# Patient Record
Sex: FEMALE | Race: WHITE | NOT HISPANIC OR LATINO | Employment: FULL TIME | URBAN - METROPOLITAN AREA
[De-identification: names, ages, dates, MRNs, and addresses within clinical notes are randomized per-mention and may not be internally consistent; named-entity substitution may affect disease eponyms.]

---

## 2019-11-19 ENCOUNTER — OFFICE VISIT (OUTPATIENT)
Dept: PODIATRY | Facility: CLINIC | Age: 24
End: 2019-11-19

## 2019-11-19 VITALS
HEIGHT: 66 IN | WEIGHT: 196 LBS | HEART RATE: 110 BPM | DIASTOLIC BLOOD PRESSURE: 86 MMHG | SYSTOLIC BLOOD PRESSURE: 130 MMHG | BODY MASS INDEX: 31.5 KG/M2

## 2019-11-19 DIAGNOSIS — L60.0 INGROWING NAIL: Primary | ICD-10-CM

## 2019-11-19 PROBLEM — R00.2 PALPITATIONS: Status: ACTIVE | Noted: 2019-05-06

## 2019-11-19 PROBLEM — F41.9 ANXIETY: Status: ACTIVE | Noted: 2018-06-25

## 2019-11-19 PROBLEM — Z30.8 ENCOUNTER FOR OTHER CONTRACEPTIVE MANAGEMENT: Status: ACTIVE | Noted: 2018-06-25

## 2019-11-19 PROCEDURE — 99203 OFFICE O/P NEW LOW 30 MIN: CPT | Mod: 25 | Performed by: PODIATRIST

## 2019-11-19 PROCEDURE — 11750 EXCISION NAIL&NAIL MATRIX: CPT | Mod: T5 | Performed by: PODIATRIST

## 2019-11-19 RX ORDER — FLUOXETINE 10 MG/1
10 CAPSULE ORAL
Refills: 0 | COMMUNITY
Start: 2019-10-15 | End: 2021-04-29

## 2019-11-19 RX ORDER — HYDROXYZINE HYDROCHLORIDE 10 MG/1
TABLET, FILM COATED ORAL
Refills: 3 | COMMUNITY
Start: 2019-10-15 | End: 2021-04-29

## 2019-11-19 RX ORDER — NORGESTREL AND ETHINYL ESTRADIOL 0.3-0.03MG
KIT ORAL
Refills: 2 | COMMUNITY
Start: 2019-08-17 | End: 2021-04-29

## 2019-11-19 RX ORDER — CROMOLYN SODIUM 40 MG/ML
SOLUTION/ DROPS OPHTHALMIC
Refills: 10 | COMMUNITY
Start: 2019-08-17 | End: 2021-04-29

## 2019-11-19 RX ORDER — FLUTICASONE PROPIONATE 50 MCG
SPRAY, SUSPENSION (ML) NASAL
Refills: 0 | COMMUNITY
Start: 2019-08-17 | End: 2021-04-29

## 2019-11-19 ASSESSMENT — MIFFLIN-ST. JEOR: SCORE: 1637.86

## 2019-11-19 NOTE — PROGRESS NOTES
"Subjective:    Pt is seen today as a new pt referral w/ the c/c of a painful ingrown right great nail both border.  This has been problematic for 2 month(s). negativehistory of drainage from the site. This is slowly getting worse.  Aggravated by activity and relieved by rest.  Has tried soaking which has not helped.   denies history of trauma to the area.  Works at sit down job.   Has had numerous temporary removals in thye past and would like permanent.      ROS:  A 10-point review of systems was performed and is positive for that noted in the HPI and as seen above.  All other areas are negative.          Allergies   Allergen Reactions     Penicillins Hives     Azithromycin Rash       Current Outpatient Medications   Medication Sig Dispense Refill     FLUoxetine (PROZAC) 10 MG capsule 10 mg  0     cromolyn (OPTICROM) 4 % ophthalmic solution INT 2 GTS IN OU QID  10     fluticasone (FLONASE) 50 MCG/ACT nasal spray SHAKE LQ AND U 2 SPRAYS IEN QD  0     hydrOXYzine (ATARAX) 10 MG tablet TK 1 TO 3 TS PO QHS  3     LOW-OGESTREL 0.3-30 MG-MCG tablet TK 1 T PO QD  2       Patient Active Problem List   Diagnosis     Anxiety     Encounter for other contraceptive management     Palpitations       No past medical history on file.    No past surgical history on file.    No family history on file.    Social History     Tobacco Use     Smoking status: Never Smoker     Smokeless tobacco: Never Used   Substance Use Topics     Alcohol use: Not on file         Exam:    Vitals: /86   Pulse 110   Ht 1.664 m (5' 5.5\")   Wt 88.9 kg (196 lb)   BMI 32.12 kg/m    BMI: Body mass index is 32.12 kg/m .  Height: 5' 5.5\"    Constitutional/ general:  Pt is in no apparent distress, appears well-nourished.  Cooperative with history and physical exam.     Psych:  The patient answered questions appropriately.  Normal affect.  Seems to have reasonable expectations, in terms of treatment.     Eyes:  Visual scanning/ tracking without deficit. "     Ears:  Response to auditory stimuli is normal.  negative hearing aid devices.  Auricles in proper alignment.     Lymphatic:  Popliteal lymph nodes not enlarged.     Lungs:  Non labored breathing, non labored speech. No cough.  No audible wheezing. Even, quiet breathing.       Vascular:  positive pedal pulses bilaterally for both the DP and PT arteries.  CFT < 3 sec.  negative ankle edema.  positive pedal hair growth.    Neuro:  Alert and oriented x 3. Coordinated gait.  Light touch sensation is intact to the L4, L5, S1 distributions. No obvious deficits.  No evidence of neurological-based weakness, spasticity, or contracture in the lower extremities.     Derm: Normal texture and turgor.  No erythema, ecchymosis, or cyanosis.      Musculoskeletal:    Lower extremity muscle strength is normal.  Patient is ambulatory without an assistive device or brace.  Normal arch with weightbearing.  No forefoot or rear foot deformities noted.   Normal ROM all fore foot and rearfoot joints.  No equinus.  right great toe nail both border shows soft tissue impingement with localized erythema.   negative active drainage/purulence at this time.  No sinus tracts.  No nailbed masses or exostosis.  No pain with range of motion of IPJ or MTPJ.  No ascending cellulitis.    ASSESSMENT:    Onychocryptosis with paronychia right great both border.    Discussed etiology and treatment options in detail w/ the pt.  The potential causes and nature of an ingrown toenail were discussed with the patient.  We reviewed the natural history/prognosis of the condition and potential risks if no treatment is provided.      Treatment options discussed included conservative management (oral antibiotics, soaking of foot, adequate width shoes)  as well as surgical management (partial or total nail removal).  The pros and cons of both forms of treatment were reviewed.  Handout given to patient.      After thorough discussion and answering all questions, the  patient elected to have permanent removal of affected nail border.  Risk complications and efficacy discussed with patient.  Obtained consent, used 3cc of 1% lidocaine plain to block right great toe.  Sterile prep, then avulsed the affected border.  No evidence of deep abscess noted.  Phenol was applied times 3 at 3o second intervals with curettage in between and then alcohol rinse.  Pt tolerated procedure well.  Sterile bandage placed, gave wound care instruction.  Return to clinic prn    Dave Hodges DPM DPM, FACFAS

## 2019-11-19 NOTE — LETTER
"    11/19/2019         RE: Deysi Stoner  [address]  [place] [address]        Dear Colleague,    Thank you for referring your patient, Deysi Stoner, to the [facility] [place]. Please see a copy of my visit note below.    Subjective:    Pt is seen today as a new pt referral w/ the c/c of a painful ingrown right great nail both border.  This has been problematic for 2 month(s). negativehistory of drainage from the site. This is slowly getting worse.  Aggravated by activity and relieved by rest.  Has tried soaking which has not helped.   denies history of trauma to the area.  Works at sit down job.   Has had numerous temporary removals in thye past and would like permanent.      ROS:  A 10-point review of systems was performed and is positive for that noted in the HPI and as seen above.  All other areas are negative.          Allergies   Allergen Reactions     Penicillins Hives     Azithromycin Rash       Current Outpatient Medications   Medication Sig Dispense Refill     FLUoxetine (PROZAC) 10 MG capsule 10 mg  0     cromolyn (OPTICROM) 4 % ophthalmic solution INT 2 GTS IN OU QID  10     fluticasone (FLONASE) 50 MCG/ACT nasal spray SHAKE LQ AND U 2 SPRAYS IEN QD  0     hydrOXYzine (ATARAX) 10 MG tablet TK 1 TO 3 TS PO QHS  3     LOW-OGESTREL 0.3-30 MG-MCG tablet TK 1 T PO QD  2       Patient Active Problem List   Diagnosis     Anxiety     Encounter for other contraceptive management     Palpitations       No past medical history on file.    No past surgical history on file.    No family history on file.    Social History     Tobacco Use     Smoking status: Never Smoker     Smokeless tobacco: Never Used   Substance Use Topics     Alcohol use: Not on file         Exam:    Vitals: /86   Pulse 110   Ht 1.664 m (5' 5.5\")   Wt 88.9 kg (196 lb)   BMI 32.12 kg/m     BMI: Body mass index is 32.12 kg/m .  Height: 5' 5.5\"    Constitutional/ general:  Pt is in no apparent distress, " appears well-nourished.  Cooperative with history and physical exam.     Psych:  The patient answered questions appropriately.  Normal affect.  Seems to have reasonable expectations, in terms of treatment.     Eyes:  Visual scanning/ tracking without deficit.     Ears:  Response to auditory stimuli is normal.  negative hearing aid devices.  Auricles in proper alignment.     Lymphatic:  Popliteal lymph nodes not enlarged.     Lungs:  Non labored breathing, non labored speech. No cough.  No audible wheezing. Even, quiet breathing.       Vascular:  positive pedal pulses bilaterally for both the DP and PT arteries.  CFT < 3 sec.  negative ankle edema.  positive pedal hair growth.    Neuro:  Alert and oriented x 3. Coordinated gait.  Light touch sensation is intact to the L4, L5, S1 distributions. No obvious deficits.  No evidence of neurological-based weakness, spasticity, or contracture in the lower extremities.     Derm: Normal texture and turgor.  No erythema, ecchymosis, or cyanosis.      Musculoskeletal:    Lower extremity muscle strength is normal.  Patient is ambulatory without an assistive device or brace.  Normal arch with weightbearing.  No forefoot or rear foot deformities noted.   Normal ROM all fore foot and rearfoot joints.  No equinus.  right great toe nail both border shows soft tissue impingement with localized erythema.   negative active drainage/purulence at this time.  No sinus tracts.  No nailbed masses or exostosis.  No pain with range of motion of IPJ or MTPJ.  No ascending cellulitis.    ASSESSMENT:    Onychocryptosis with paronychia right great both border.    Discussed etiology and treatment options in detail w/ the pt.  The potential causes and nature of an ingrown toenail were discussed with the patient.  We reviewed the natural history/prognosis of the condition and potential risks if no treatment is provided.      Treatment options discussed included conservative management (oral  antibiotics, soaking of foot, adequate width shoes)  as well as surgical management (partial or total nail removal).  The pros and cons of both forms of treatment were reviewed.  Handout given to patient.      After thorough discussion and answering all questions, the patient elected to have permanent removal of affected nail border.  Risk complications and efficacy discussed with patient.  Obtained consent, used 3cc of 1% lidocaine plain to block right great toe.  Sterile prep, then avulsed the affected border.  No evidence of deep abscess noted.  Phenol was applied times 3 at 3o second intervals with curettage in between and then alcohol rinse.  Pt tolerated procedure well.  Sterile bandage placed, gave wound care instruction.  Return to clinic prn    Dave Hodges DPM DPM, FACFAS      Again, thank you for allowing me to participate in the care of your patient.        Sincerely,        Dave Hodges DPM

## 2019-11-19 NOTE — PATIENT INSTRUCTIONS
Weight management plan: Patient was referred to their PCP to discuss a diet and exercise plan.  We wish you continued good healing. If you have any questions or concerns, please do not hesitate to contact us at [phone]    Please remember to call and schedule a follow up appointment if one was recommended at your earliest convenience.   PODIATRY CLINIC HOURS  TELEPHONE NUMBER    Dr. Dave Hodges D.P.M [facility]    Clinics:  [facility]    Jackie Mcconnell [facility]   Tuesday 1PM-6PM  [place]  Wednesday 7AM-2PM  [facility]  Thursday 10AM-6PM  [place]  Friday 7AM-3PM  [place]  Specialty schedulers:   [phone] to make an appointment with any Specialty Provider.        Urgent Care locations:    [facility] Monday-Friday 5 pm - 9 pm. Saturday-Sunday 9 am -5pm    Monday-Friday 11 am - 9 pm Saturday 9 am - 5 pm     Monday-Sunday 12 noon-8PM [phone]     If you need a medication refill, please contact us you may need lab work and/or a follow up visit prior to your refill (i.e. Antifungal medications).    [facility] (secure e-mail communication and access to your chart) to send a message or to make an appointment.    If MRI needed please call [place] at [phone]

## 2019-11-26 ENCOUNTER — OFFICE VISIT (OUTPATIENT)
Dept: PODIATRY | Facility: CLINIC | Age: 24
End: 2019-11-26

## 2019-11-26 VITALS
SYSTOLIC BLOOD PRESSURE: 140 MMHG | BODY MASS INDEX: 32.12 KG/M2 | DIASTOLIC BLOOD PRESSURE: 90 MMHG | HEART RATE: 92 BPM | WEIGHT: 196 LBS

## 2019-11-26 DIAGNOSIS — L60.0 INGROWING NAIL: Primary | ICD-10-CM

## 2019-11-26 PROCEDURE — 11750 EXCISION NAIL&NAIL MATRIX: CPT | Mod: 79 | Performed by: PODIATRIST

## 2019-11-26 NOTE — LETTER
11/26/2019         RE: Deysi Stoner  [address] [place] [address]        Dear Colleague,    Thank you for referring your patient, Deysi Stoner, to the [facility]. Please see a copy of my visit note below.    Subjective:    Pt is seen today w/ the c/c of a painful ingrown left great nail both border.  This has been problematic for years off and on.  Recently bothersome for a few months negativehistory of drainage from the site. This is slowly getting worse.  Aggravated by activity and relieved by rest.  Has tried soaking which has not helped.   denies history of trauma to the area.  Has had a temporary in the past.  Would like a permanent today.  She had a permanent removal done on both borders of her right hallux approximately 1 week ago.  She says just recently is now starting to feel better and drain less.     ROS:  A 10-point review of systems was performed and is positive for that noted in the HPI and as seen above.  All other areas are negative.          Allergies   Allergen Reactions     Penicillins Hives     Azithromycin Rash       Current Outpatient Medications   Medication Sig Dispense Refill     cromolyn (OPTICROM) 4 % ophthalmic solution INT 2 GTS IN OU QID  10     FLUoxetine (PROZAC) 10 MG capsule 10 mg  0     fluticasone (FLONASE) 50 MCG/ACT nasal spray SHAKE LQ AND U 2 SPRAYS IEN QD  0     hydrOXYzine (ATARAX) 10 MG tablet TK 1 TO 3 TS PO QHS  3     LOW-OGESTREL 0.3-30 MG-MCG tablet TK 1 T PO QD  2       Patient Active Problem List   Diagnosis     Anxiety     Encounter for other contraceptive management     Palpitations       No past medical history on file.    No past surgical history on file.    No family history on file.    Social History     Tobacco Use     Smoking status: Never Smoker     Smokeless tobacco: Never Used   Substance Use Topics     Alcohol use: Not on file         Exam:    Vitals: BP (!) 140/90   Pulse 92   Wt 88.9 kg (196 lb)   BMI 32.12 kg/m     BMI:  Body mass index is 32.12 kg/m .  Height: Data Unavailable    Constitutional/ general:  Pt is in no apparent distress, appears well-nourished.  Cooperative with history and physical exam.     Psych:  The patient answered questions appropriately.  Normal affect.  Seems to have reasonable expectations, in terms of treatment.     Eyes:  Visual scanning/ tracking without deficit.     Ears:  Response to auditory stimuli is normal.  negative hearing aid devices.  Auricles in proper alignment.     Lymphatic:  Popliteal lymph nodes not enlarged.     Lungs:  Non labored breathing, non labored speech. No cough.  No audible wheezing. Even, quiet breathing.       Vascular:  positive pedal pulses bilaterally for both the DP and PT arteries.  CFT < 3 sec.  negative ankle edema.  positive pedal hair growth.    Neuro:  Alert and oriented x 3. Coordinated gait.  Light touch sensation is intact to the L4, L5, S1 distributions. No obvious deficits.  No evidence of neurological-based weakness, spasticity, or contracture in the lower extremities.     Derm: Normal texture and turgor.  No erythema, ecchymosis, or cyanosis.      Musculoskeletal:    Lower extremity muscle strength is normal.  Patient is ambulatory without an assistive device or brace.  Normal arch with weightbearing.  No forefoot or rear foot deformities noted.   Normal ROM all fore foot and rearfoot joints.  No equinus.  left great toe nail both border shows soft tissue impingement with localized erythema.   negative active drainage/purulence at this time.  No sinus tracts.  No nailbed masses or exostosis.  No pain with range of motion of IPJ or MTPJ.  No ascending cellulitis.  Right hallux both borders has normal erythema and edema for 1 week postop phenol and alcohol.  There is no purulence or odor.    ASSESSMENT:    Onychocryptosis with paronychia left great both border.  Status post 1 week permanent removal of right hallux both borders    Discussed with patient that the  amount of erythema and edema she has on her right hallux is normal.  She will continue soaking this and give it time.  If for some reason this is getting worse she will call and we will start her on an antibiotic.    Discussed etiology and treatment options in detail w/ the pt.  The potential causes and nature of an ingrown toenail were discussed with the patient.  We reviewed the natural history/prognosis of the condition and potential risks if no treatment is provided.      Treatment options discussed included conservative management (oral antibiotics, soaking of foot, adequate width shoes)  as well as surgical management (partial or total nail removal).  The pros and cons of both forms of treatment were reviewed.  Handout given to patient.      After thorough discussion and answering all questions, the patient elected to have permanent removal of affected nail border.  Risk complications and efficacy discussed with patient.  Obtained consent, used 3cc of 1% lidocaine plain to block left great toe.  Sterile prep, then avulsed both borders.  No evidence of deep abscess noted.  Phenol was applied times 3 at 3o second intervals with curettage in between and then alcohol rinse.  Pt tolerated procedure well.  Sterile bandage placed, gave wound care instruction.  Return to clinic prn    Dave Hodges DPM DPM, FACFAS      Again, thank you for allowing me to participate in the care of your patient.        Sincerely,        Dave Hodges DPM

## 2019-11-26 NOTE — PATIENT INSTRUCTIONS
Weight management plan: Patient was referred to their PCP to discuss a diet and exercise plan.     Deysi to follow up with Primary Care provider regarding elevated blood pressure.

## 2019-11-26 NOTE — PROGRESS NOTES
Subjective:    Pt is seen today w/ the c/c of a painful ingrown left great nail both border.  This has been problematic for years off and on.  Recently bothersome for a few months negativehistory of drainage from the site. This is slowly getting worse.  Aggravated by activity and relieved by rest.  Has tried soaking which has not helped.   denies history of trauma to the area.  Has had a temporary in the past.  Would like a permanent today.  She had a permanent removal done on both borders of her right hallux approximately 1 week ago.  She says just recently is now starting to feel better and drain less.     ROS:  A 10-point review of systems was performed and is positive for that noted in the HPI and as seen above.  All other areas are negative.          Allergies   Allergen Reactions     Penicillins Hives     Azithromycin Rash       Current Outpatient Medications   Medication Sig Dispense Refill     cromolyn (OPTICROM) 4 % ophthalmic solution INT 2 GTS IN OU QID  10     FLUoxetine (PROZAC) 10 MG capsule 10 mg  0     fluticasone (FLONASE) 50 MCG/ACT nasal spray SHAKE LQ AND U 2 SPRAYS IEN QD  0     hydrOXYzine (ATARAX) 10 MG tablet TK 1 TO 3 TS PO QHS  3     LOW-OGESTREL 0.3-30 MG-MCG tablet TK 1 T PO QD  2       Patient Active Problem List   Diagnosis     Anxiety     Encounter for other contraceptive management     Palpitations       No past medical history on file.    No past surgical history on file.    No family history on file.    Social History     Tobacco Use     Smoking status: Never Smoker     Smokeless tobacco: Never Used   Substance Use Topics     Alcohol use: Not on file         Exam:    Vitals: BP (!) 140/90   Pulse 92   Wt 88.9 kg (196 lb)   BMI 32.12 kg/m    BMI: Body mass index is 32.12 kg/m .  Height: Data Unavailable    Constitutional/ general:  Pt is in no apparent distress, appears well-nourished.  Cooperative with history and physical exam.     Psych:  The patient answered questions  appropriately.  Normal affect.  Seems to have reasonable expectations, in terms of treatment.     Eyes:  Visual scanning/ tracking without deficit.     Ears:  Response to auditory stimuli is normal.  negative hearing aid devices.  Auricles in proper alignment.     Lymphatic:  Popliteal lymph nodes not enlarged.     Lungs:  Non labored breathing, non labored speech. No cough.  No audible wheezing. Even, quiet breathing.       Vascular:  positive pedal pulses bilaterally for both the DP and PT arteries.  CFT < 3 sec.  negative ankle edema.  positive pedal hair growth.    Neuro:  Alert and oriented x 3. Coordinated gait.  Light touch sensation is intact to the L4, L5, S1 distributions. No obvious deficits.  No evidence of neurological-based weakness, spasticity, or contracture in the lower extremities.     Derm: Normal texture and turgor.  No erythema, ecchymosis, or cyanosis.      Musculoskeletal:    Lower extremity muscle strength is normal.  Patient is ambulatory without an assistive device or brace.  Normal arch with weightbearing.  No forefoot or rear foot deformities noted.   Normal ROM all fore foot and rearfoot joints.  No equinus.  left great toe nail both border shows soft tissue impingement with localized erythema.   negative active drainage/purulence at this time.  No sinus tracts.  No nailbed masses or exostosis.  No pain with range of motion of IPJ or MTPJ.  No ascending cellulitis.  Right hallux both borders has normal erythema and edema for 1 week postop phenol and alcohol.  There is no purulence or odor.    ASSESSMENT:    Onychocryptosis with paronychia left great both border.  Status post 1 week permanent removal of right hallux both borders    Discussed with patient that the amount of erythema and edema she has on her right hallux is normal.  She will continue soaking this and give it time.  If for some reason this is getting worse she will call and we will start her on an antibiotic.    Discussed  etiology and treatment options in detail w/ the pt.  The potential causes and nature of an ingrown toenail were discussed with the patient.  We reviewed the natural history/prognosis of the condition and potential risks if no treatment is provided.      Treatment options discussed included conservative management (oral antibiotics, soaking of foot, adequate width shoes)  as well as surgical management (partial or total nail removal).  The pros and cons of both forms of treatment were reviewed.  Handout given to patient.      After thorough discussion and answering all questions, the patient elected to have permanent removal of affected nail border.  Risk complications and efficacy discussed with patient.  Obtained consent, used 3cc of 1% lidocaine plain to block left great toe.  Sterile prep, then avulsed both borders.  No evidence of deep abscess noted.  Phenol was applied times 3 at 3o second intervals with curettage in between and then alcohol rinse.  Pt tolerated procedure well.  Sterile bandage placed, gave wound care instruction.  Return to clinic prn    Dave Hodges DPM DPM, FACFAS

## 2020-01-07 ENCOUNTER — OFFICE VISIT (OUTPATIENT)
Dept: FAMILY MEDICINE | Facility: CLINIC | Age: 25
End: 2020-01-07
Payer: COMMERCIAL

## 2020-01-07 VITALS
HEART RATE: 120 BPM | HEIGHT: 65 IN | DIASTOLIC BLOOD PRESSURE: 96 MMHG | WEIGHT: 197 LBS | BODY MASS INDEX: 32.82 KG/M2 | SYSTOLIC BLOOD PRESSURE: 138 MMHG | OXYGEN SATURATION: 100 % | TEMPERATURE: 101 F

## 2020-01-07 DIAGNOSIS — J10.1 INFLUENZA A: Primary | ICD-10-CM

## 2020-01-07 LAB
FLUAV+FLUBV AG SPEC QL: NEGATIVE
FLUAV+FLUBV AG SPEC QL: POSITIVE
SPECIMEN SOURCE: ABNORMAL

## 2020-01-07 PROCEDURE — 87804 INFLUENZA ASSAY W/OPTIC: CPT | Mod: 59 | Performed by: FAMILY MEDICINE

## 2020-01-07 PROCEDURE — 99203 OFFICE O/P NEW LOW 30 MIN: CPT | Performed by: FAMILY MEDICINE

## 2020-01-07 RX ORDER — CODEINE PHOSPHATE AND GUAIFENESIN 10; 100 MG/5ML; MG/5ML
1-2 SOLUTION ORAL EVERY 4 HOURS PRN
Qty: 240 ML | Refills: 0 | Status: SHIPPED | OUTPATIENT
Start: 2020-01-07 | End: 2020-07-15

## 2020-01-07 ASSESSMENT — PAIN SCALES - GENERAL: PAINLEVEL: NO PAIN (0)

## 2020-01-07 ASSESSMENT — MIFFLIN-ST. JEOR: SCORE: 1634.47

## 2020-01-07 NOTE — PROGRESS NOTES
"SUBJECTIVE:   Deysi Stoner is a 24 year old female presenting with a chief complaint of a cough.  The patient first noted the onset of symptoms was 2 day(s) ago. Sunday in the am   The patient (or parent) reports that she first had symptoms of fever up to 102.7 and chills . After that she started having symptoms of a cough. After that she started having symptoms of soreness in the chest and abdominal and . Other symptoms that followed include a sore throat amd muscle acheds    She (or parent) denies: shortness of breath.      The patient (or parent) reports that she had tried Dayquil and Nyquill  and it has not been helpful.  The patient has the following predisposing factors for infection: chronic  allergies.    Patient Active Problem List   Diagnosis     Anxiety     Encounter for other contraceptive management     Palpitations     Current Outpatient Medications   Medication Sig Dispense Refill     cromolyn (OPTICROM) 4 % ophthalmic solution INT 2 GTS IN OU QID  10     FLUoxetine (PROZAC) 10 MG capsule 10 mg  0     fluticasone (FLONASE) 50 MCG/ACT nasal spray SHAKE LQ AND U 2 SPRAYS IEN QD  0     hydrOXYzine (ATARAX) 10 MG tablet TK 1 TO 3 TS PO QHS  3     LOW-OGESTREL 0.3-30 MG-MCG tablet TK 1 T PO QD  2     Social History     Tobacco Use     Smoking status: Never Smoker     Smokeless tobacco: Never Used   Substance Use Topics     Alcohol use: Not on file       ROS:      OBJECTIVE  :BP (!) 138/96   Pulse 120   Temp 101  F (38.3  C) (Oral)   Ht 1.651 m (5' 5\")   Wt 89.4 kg (197 lb)   SpO2 100%   BMI 32.78 kg/m    GENERAL APPEARANCE: healthy, alert and no distress  EYES: EOMI,  PERRL, conjunctiva clear  HENT: ear canals and TM's normal.  Nose and mouth without ulcers, erythema or lesions  NECK: supple, nontender, no lymphadenopathy  RESP: lungs clear to auscultation - no rales, rhonchi or wheezes  CV: regular rates and rhythm, normal S1 S2, no murmur noted  ABDOMEN:  soft, nontender, no HSM or masses and " bowel sounds normal  NEURO: Normal strength and tone, sensory exam grossly normal,  normal speech and mentation  SKIN: no suspicious lesions or rashes    Results for orders placed or performed in visit on 01/07/20   Influenza A/B antigen     Status: Abnormal   Result Value Ref Range    Influenza A/B Agn Specimen Nasal     Influenza A Positive (A) NEG^Negative    Influenza B Negative NEG^Negative       ASSESSMENT:  Influenza    PLAN:  The patient was reassured that there is no evidence of a bacterial etiology., I recommended that the patient get lots of fluids and rest. and A prescription for Cherritussin AC was given    During the visit I did wear a mask the entire time I was in the exam room with the patient.    During the visit the patient did wear a mask while I was in the exam room with her  except when I was examining her nose and throat or doing the nasopharyngeal swab.

## 2020-01-07 NOTE — NURSING NOTE
"Chief Complaint   Patient presents with     URI     fever, chills, cough, exposed to flu, started Sunday,     Health Maintenance     order pended, Physical/PAP       Initial BP (!) 138/96   Pulse 120   Temp 101  F (38.3  C) (Oral)   Ht 1.651 m (5' 5\")   Wt 89.4 kg (197 lb)   SpO2 100%   BMI 32.78 kg/m   Estimated body mass index is 32.78 kg/m  as calculated from the following:    Height as of this encounter: 1.651 m (5' 5\").    Weight as of this encounter: 89.4 kg (197 lb).  Medication Reconciliation: complete  Evon Antonio CMA  "

## 2020-01-07 NOTE — LETTER
[facility]  [address] [facility] [address]  Phone: [phone]    January 7, 2020        Deysi Stoner  [address] [place] [facility] [address]          To whom it may concern:    RE: Deysi Stoner    Patient was seen and treated today at our clinic and missed work.  She can return to work when she has not had a fever for 24 hours.       Please contact me for questions or concerns.      Sincerely,        Angel Rizzo MD

## 2020-01-08 ENCOUNTER — TELEPHONE (OUTPATIENT)
Dept: FAMILY MEDICINE | Facility: CLINIC | Age: 25
End: 2020-01-08

## 2020-01-08 DIAGNOSIS — J02.9 PHARYNGITIS, UNSPECIFIED ETIOLOGY: Primary | ICD-10-CM

## 2020-01-08 NOTE — TELEPHONE ENCOUNTER
The sore throat is likely a result(s) of the influenza infection. She can get a strep test on the ancillary appointment(s) tomorrow or be seen at urgent care [facility].   Angel Rizzo MD

## 2020-01-08 NOTE — TELEPHONE ENCOUNTER
Pt notified of provider message as written.  Pt verbalized good understanding.  Ancillary appointment made.  Radha KAHNN, RN

## 2020-01-08 NOTE — TELEPHONE ENCOUNTER
Reason for Call:  Other would like antibiotic    Detailed comments: pt has tested positive for flu.  She is positive she has strep also but no test taken.  Pt would like antibiotic for strep but allergic to: penicillin family.  [facility] [facility] [address]  Caller informed that calls received after 3pm may not be returned same day.  Thank you    Phone Number Patient can be reached at: Cell number on file:    Telephone Information:   Mobile [phone]       Best Time: any    Can we leave a detailed message on this number? YES    Call taken on 1/8/2020 at 4:08 PM by Susy Lim

## 2020-01-08 NOTE — TELEPHONE ENCOUNTER
See ov note yesterday.     Pt states she has had strep a million times and her throat is worse today and feels exactly like when she has had strep in the past.  Pt is unsure if she has a fever, she has not taken her temp.  Pt requesting an antibiotic for strep.  Radha KAHNN, RN

## 2020-01-09 ENCOUNTER — TELEPHONE (OUTPATIENT)
Dept: FAMILY MEDICINE | Facility: CLINIC | Age: 25
End: 2020-01-09

## 2020-01-09 ENCOUNTER — ALLIED HEALTH/NURSE VISIT (OUTPATIENT)
Dept: NURSING | Facility: CLINIC | Age: 25
End: 2020-01-09
Payer: COMMERCIAL

## 2020-01-09 DIAGNOSIS — J02.9 PHARYNGITIS, UNSPECIFIED ETIOLOGY: ICD-10-CM

## 2020-01-09 LAB
DEPRECATED S PYO AG THROAT QL EIA: NORMAL
SPECIMEN SOURCE: NORMAL

## 2020-01-09 PROCEDURE — 99207 ZZC NO CHARGE LOS: CPT

## 2020-01-09 PROCEDURE — 87081 CULTURE SCREEN ONLY: CPT | Performed by: FAMILY MEDICINE

## 2020-01-09 PROCEDURE — 87880 STREP A ASSAY W/OPTIC: CPT | Performed by: FAMILY MEDICINE

## 2020-01-09 NOTE — TELEPHONE ENCOUNTER
Reason for Call:  Other call back    Detailed comments: pt would like results of the strep test taken today.  Caller informed that calls received after 3pm may not be returned same day.  Please call back    Phone Number Patient can be reached at: Cell number on file:    Telephone Information:   Mobile [phone]       Best Time: any    Can we leave a detailed message on this number? YES    Call taken on 1/9/2020 at 5:09 PM by Susy Lim

## 2020-01-09 NOTE — PROGRESS NOTES
Pt here for strep test per Dr Manjeet Wing. Pt aware that she will get results later today.  Dot Nolasco, CMA

## 2020-01-09 NOTE — LETTER
January 13, 2020    Deysi Stoner  [address] [place] [address]            Dear Deysi,    I have reviewed the results of the laboratory tests that we recently ordered. All of the lab work performed was normal or considered normal for you.     Below is a copy of the results.  It was a pleasure to see you at your last appointment.    If you have any questions or concerns, please call myself or my nurse at [phone].    Sincerely,    Angel Rizzo MD  / travis    Results for orders placed or performed in visit on 01/09/20   Strep, Rapid Screen     Status: None   Result Value Ref Range    Specimen Description Throat     Rapid Strep A Screen       NEGATIVE: No Group A streptococcal antigen detected by immunoassay, await culture report.   Beta strep group A culture     Status: None   Result Value Ref Range    Specimen Description Throat     Culture Micro No beta hemolytic Streptococcus Group A isolated

## 2020-01-09 NOTE — TELEPHONE ENCOUNTER
Called patient and informed her that her rapid strep is negative.  Culture pending.  Can use ibuprofen, cool foods, throat lozenges for sore throat.  She will be contacted if culture is positive.     Informed her urgent care open over the weekend if symptoms worsen.     Sylvia KAHNN, RN

## 2020-01-10 LAB
BACTERIA SPEC CULT: NORMAL
SPECIMEN SOURCE: NORMAL

## 2020-01-10 NOTE — RESULT ENCOUNTER NOTE
Deysi,  I have reviewed the results of the laboratory tests that we recently ordered. All of the lab work performed was normal or considered normal for you.  Sincerely,   Angel Rizzo MD

## 2020-03-04 ENCOUNTER — TELEPHONE (OUTPATIENT)
Dept: GASTROENTEROLOGY | Facility: CLINIC | Age: 25
End: 2020-03-04

## 2020-03-04 ENCOUNTER — ANCILLARY PROCEDURE (OUTPATIENT)
Dept: ULTRASOUND IMAGING | Facility: CLINIC | Age: 25
End: 2020-03-04
Attending: NURSE PRACTITIONER
Payer: COMMERCIAL

## 2020-03-04 ENCOUNTER — OFFICE VISIT (OUTPATIENT)
Dept: INTERNAL MEDICINE | Facility: CLINIC | Age: 25
End: 2020-03-04
Payer: COMMERCIAL

## 2020-03-04 ENCOUNTER — HOSPITAL ENCOUNTER (OUTPATIENT)
Facility: AMBULATORY SURGERY CENTER | Age: 25
End: 2020-03-04
Attending: INTERNAL MEDICINE
Payer: COMMERCIAL

## 2020-03-04 VITALS
BODY MASS INDEX: 32.95 KG/M2 | WEIGHT: 198 LBS | SYSTOLIC BLOOD PRESSURE: 121 MMHG | DIASTOLIC BLOOD PRESSURE: 83 MMHG | OXYGEN SATURATION: 99 % | HEART RATE: 83 BPM

## 2020-03-04 DIAGNOSIS — R10.9 RIGHT-SIDED ABDOMINAL PAIN OF UNKNOWN ETIOLOGY: ICD-10-CM

## 2020-03-04 DIAGNOSIS — R10.9 RIGHT-SIDED ABDOMINAL PAIN OF UNKNOWN ETIOLOGY: Primary | ICD-10-CM

## 2020-03-04 DIAGNOSIS — R11.0 CHRONIC NAUSEA: ICD-10-CM

## 2020-03-04 LAB
ALBUMIN SERPL-MCNC: 3.9 G/DL (ref 3.4–5)
ALP SERPL-CCNC: 84 U/L (ref 40–150)
ALT SERPL W P-5'-P-CCNC: 35 U/L (ref 0–50)
ANION GAP SERPL CALCULATED.3IONS-SCNC: 4 MMOL/L (ref 3–14)
AST SERPL W P-5'-P-CCNC: 22 U/L (ref 0–45)
BASOPHILS # BLD AUTO: 0 10E9/L (ref 0–0.2)
BASOPHILS NFR BLD AUTO: 0.4 %
BILIRUB SERPL-MCNC: 0.3 MG/DL (ref 0.2–1.3)
BUN SERPL-MCNC: 7 MG/DL (ref 7–30)
CALCIUM SERPL-MCNC: 9.3 MG/DL (ref 8.5–10.1)
CHLORIDE SERPL-SCNC: 106 MMOL/L (ref 94–109)
CO2 SERPL-SCNC: 29 MMOL/L (ref 20–32)
CREAT SERPL-MCNC: 0.71 MG/DL (ref 0.52–1.04)
DIFFERENTIAL METHOD BLD: ABNORMAL
EOSINOPHIL # BLD AUTO: 0.1 10E9/L (ref 0–0.7)
EOSINOPHIL NFR BLD AUTO: 1.6 %
ERYTHROCYTE [DISTWIDTH] IN BLOOD BY AUTOMATED COUNT: 12.9 % (ref 10–15)
GFR SERPL CREATININE-BSD FRML MDRD: >90 ML/MIN/{1.73_M2}
GLUCOSE SERPL-MCNC: 93 MG/DL (ref 70–99)
HCT VFR BLD AUTO: 45.9 % (ref 35–47)
HGB BLD-MCNC: 15.2 G/DL (ref 11.7–15.7)
IMM GRANULOCYTES # BLD: 0 10E9/L (ref 0–0.4)
IMM GRANULOCYTES NFR BLD: 0.2 %
LYMPHOCYTES # BLD AUTO: 1.9 10E9/L (ref 0.8–5.3)
LYMPHOCYTES NFR BLD AUTO: 32.5 %
MCH RBC QN AUTO: 28.6 PG (ref 26.5–33)
MCHC RBC AUTO-ENTMCNC: 33.1 G/DL (ref 31.5–36.5)
MCV RBC AUTO: 86 FL (ref 78–100)
MONOCYTES # BLD AUTO: 0.3 10E9/L (ref 0–1.3)
MONOCYTES NFR BLD AUTO: 6 %
NEUTROPHILS # BLD AUTO: 3.4 10E9/L (ref 1.6–8.3)
NEUTROPHILS NFR BLD AUTO: 59.3 %
PLATELET # BLD AUTO: 265 10E9/L (ref 150–450)
POTASSIUM SERPL-SCNC: 4.1 MMOL/L (ref 3.4–5.3)
PROT SERPL-MCNC: 8.2 G/DL (ref 6.8–8.8)
RBC # BLD AUTO: 5.31 10E12/L (ref 3.8–5.2)
SODIUM SERPL-SCNC: 139 MMOL/L (ref 133–144)
WBC # BLD AUTO: 5.7 10E9/L (ref 4–11)

## 2020-03-04 PROCEDURE — 76700 US EXAM ABDOM COMPLETE: CPT

## 2020-03-04 PROCEDURE — 85025 COMPLETE CBC W/AUTO DIFF WBC: CPT | Performed by: NURSE PRACTITIONER

## 2020-03-04 PROCEDURE — 80053 COMPREHEN METABOLIC PANEL: CPT | Performed by: NURSE PRACTITIONER

## 2020-03-04 PROCEDURE — 36415 COLL VENOUS BLD VENIPUNCTURE: CPT | Performed by: NURSE PRACTITIONER

## 2020-03-04 RX ORDER — PANTOPRAZOLE SODIUM 20 MG/1
40 TABLET, DELAYED RELEASE ORAL DAILY
Qty: 180 TABLET | Refills: 1 | Status: SHIPPED | OUTPATIENT
Start: 2020-03-04 | End: 2020-07-15

## 2020-03-04 ASSESSMENT — PAIN SCALES - GENERAL: PAINLEVEL: NO PAIN (0)

## 2020-03-04 NOTE — NURSING NOTE
Chief Complaint   Patient presents with     GI Problem     pt here for possible GI referral       Yuan Hernandez CMA, EMT at 7:11 AM on 3/4/2020.

## 2020-03-04 NOTE — PROGRESS NOTES
"[facility]   Wendy MILLER Prettybrian, SAHARA CNP  03/04/2020      Chief Complaint:   GI Problem     History of Present Illness:   Deysi Stoner is a 24 year old female with a history of anxiety who presents for evaluation of GERD and referral to GI. She is fasting.     GERD  She feels nauseous daily over the past year and has a constant belching that she feels (and hears) within her throat. She does not feel air coming up and does not feel it is painful or has a sour taste in her mouth. It has been disturbing her life, however, as it occurs during meetings at work. She has vomited \"the entire next day\" after drinking alcohol, even if it is a single glass of wine. She does not see blood in the emesis and will throw up food if she has recently eaten, otherwise has frequent dry-heaving. She has not noticed any food triggers, just generally after eating. She has right-sided constant, burning abdominal ache, more prominent in the last two weeks. She further reports hair loss -- TSH, vitamin D, and ferritin was normal on lab results in August 2019. Symptoms overall have been ongoing for several months, close to a year, and have been worsening. She had been prescribed omeprazole over a year ago without improvement in her symptoms. Bowel movements are regular without blood in the stool. Denies loose/watery stool. She has no recent sexual partners and is on birth control, denies any chance of pregnancy. Periods are regular  She recently tested positive for influenza A in Jan 2020 and now has a cold, but no recent fevers. No difficulty swallowing, choking sensation, or urinary changes. Weight has been stable. Denies family history of abdominal problems or acid reflux.      Review of Systems:   Pertinent items are noted in HPI or as in patient entered ROS below, remainder of complete ROS is negative.     Active Medications:      cromolyn (OPTICROM) 4 % ophthalmic solution, INT 2 GTS IN OU QID, Disp: , Rfl: " 10     FLUoxetine (PROZAC) 10 MG capsule, 10 mg, Disp: , Rfl: 0     fluticasone (FLONASE) 50 MCG/ACT nasal spray, SHAKE LQ AND U 2 SPRAYS IEN QD, Disp: , Rfl: 0     hydrOXYzine (ATARAX) 10 MG tablet, TK 1 TO 3 TS PO QHS, Disp: , Rfl: 3     LOW-OGESTREL 0.3-30 MG-MCG tablet, TK 1 T PO QD, Disp: , Rfl: 2      Allergies:   Penicillins and Azithromycin      Past Medical History:  Anxiety   Palpitations      Past Surgical History:  History reviewed. No pertinent past surgical history.     Family History:    History reviewed. No pertinent family history.       Social History:   The patient was alone.   Smoking Status: Never smoker    Smokeless Tobacco: Never used   Alcohol Use: Yes, occasionally   Employment status:  for [employer]      Physical Exam:   /83 (BP Location: Right arm, Patient Position: Sitting, Cuff Size: Adult Regular)   Pulse 83   Wt 89.8 kg (198 lb)   LMP 02/04/2020   SpO2 99%   BMI 32.95 kg/m       Constitutional: Alert, oriented, pleasant, no acute distress  Head: Normocephalic, atraumatic  Eyes: Extra-ocular movements intact, pupils equally round and reactive bilaterally, no scleral icterus  Ears: tympanic membranes pearly gray with positive light reflex  ENT: Oropharynx clear, moist mucus membranes, good dentition  Neck: Supple, no lymphadenopathy, no thyromegaly  Cardiovascular: Regular rate and rhythm, no murmurs, rubs or gallops  Respiratory: Good air movement bilaterally, lungs clear, no wheezes/rales/rhonchi  GI: Abdomen soft, bowel sounds present, nondistended, tenderness across RUQ and RLQ, no organomegaly or masses, no rebound/guarding  Neurologic: Alert and oriented, cranial nerves grossly intact, no focal deficits   Psychiatric: normal mentation, affect and mood      Assessment and Plan:  Right-sided abdominal pain of unknown etiology  Chronic nausea  Symptoms of right-sided burning abdominal pain in the last two weeks, as well as frequent belching, daily  nausea, vomiting after minimal alcohol ingestion, and reported hair loss for several months. Ferritin, vitamin D, and TSH normal in August 2019; No change in symptoms while on omeprazole over 1 year ago. Recent influenza A infection in January 2020 has resolved and low chance of pregnancy given patient is on OCP without recent sexual partners. Will refer to GI for further evaluation. In the interim, will obtain blood work, EGD, abdominal US, and start trial of pantoprazole 40mg daily for 6-8 weeks. If CBC elevated and abdominal US normal, consider abdominal CT. Advised her to seek emergency evaluation if she develops fever or worsening/severe abdominal pain, she agrees with the plan.  - US Abdomen complete  - CBC with platelets differential  - Comprehensive metabolic panel  - GASTROENTEROLOGY ADULT REFERRAL    Follow-up: PRN     Scribghazala Disclosure:  I, Zarina Sargent, am serving as a scribe to document services personally performed by SAHARA Leija CNP at this visit, based upon the provider's statements to me. All documentation has been reviewed by the aforementioned provider prior to being entered into the official medical record.     Portions of this medical record were completed by a scribe. UPON MY REVIEW AND AUTHENTICATION BY ELECTRONIC SIGNATURE, this confirms (a) I performed the applicable clinical services, and (b) the record is accurate.    SAHARA Leija CNP

## 2020-03-04 NOTE — TELEPHONE ENCOUNTER
RECORDS RECEIVED FROM: [facility] - Wendy Vasquez APRN CNP   DATE RECEIVED: 4/20/2020   NOTES STATUS DETAILS   OFFICE NOTE from referring provider Internal 3/4/2020 notes with Wendy Vasquez APRN CNP   OFFICE NOTE from other specialist Care Everywhere  1/8/16 ED Note at [facility]    DISCHARGE SUMMARY from hospital N/A    OPERATIVE REPORT N/A    MEDICATION LIST Internal         ENDOSCOPY  Internal 4/1/2020 EGD scheduled    COLONOSCOPY N/A    ERCP N/A    EUS N/A    STOOL TESTING N/A    PERTINENT LABS N/A    PATHOLOGY REPORTS (RELATED) N/A    IMAGING (CT, MRI, EGD) Internal & Care Everywhere  3/4/2020 US Abdomen    Images at [facility] (reports in Care Everywhere)   1/6/16 CT ABDOMEN WWO PELVIS   12/30/15 US PELVIS     *3/11/2020 Images from [facility] archived into PACS. -Sulema        3/4/2020 12:20PM sent a fax to [facility] for images - Amay     REFERRAL INFORMATION    Date referral was placed: 3/4/2020   Date all records received:    Date records were scanned into EPIC:    Date records were sent to Provider to review:    Date and recommendation received from provider:  LETTER SENT  SCHEDULE APPOINTMENT   Date patient was contacted to schedule:

## 2020-03-04 NOTE — PATIENT INSTRUCTIONS
[facility] Medication Refill Request Information:  * Please contact your pharmacy regarding ANY request for medication refills.  ** [facility] Prescription Fax = [phone]  * Please allow 3 business days for routine medication refills.  * Please allow 5 business days for controlled substance medication refills.     [facility] Test Result notification information:  *You will be notified with in 7-10 days of your appointment day regarding the results of your test.  If you are on MyChart you will be notified as soon as the provider has reviewed the results and signed off on them.    [facility]: [phone]     Upper GI Endoscopy (EGD) [phone]

## 2020-03-16 ENCOUNTER — NURSE TRIAGE (OUTPATIENT)
Dept: CALL CENTER | Age: 25
End: 2020-03-16

## 2020-03-16 NOTE — TELEPHONE ENCOUNTER
COVID 19 Nurse Triage Plan  Travel: February [place]  Exposure to pt w/ COVID19 not aware of.  Works at [employer]- pt contact    Fever: 99.7 this AM,  Pt work was checking everyones temp at the entrance to work and  She was told she needed to be tested for COVID    Cough: no  SOB; No    Health hx: no significant    Patient referred to virtual visit with a provider through [facility] (Preferred option). **Follow System Ambulatory Workflow for COVID 19 on instructions on how to access [facility]**     Instructions Given to Patient  It is recommended that you setup a virtual visit with one of our virtual providers.  To do this follow these instructions:    1. Go to the website [email or web address]  2. Create an account (you will need your insurance information)  3. Start a new visit  4. Choose your diagnosis (e.g. COVID19)  5. Fill out the information about your symptoms  6. A provider will reach out to you by text, phone call or video visit based on your request    While you are at home please follow these instructions to care for yourself:    Isolate Yourself:    Isolate yourself at home.     Do Not allow any visitors    Do Not go to work or school    Do Not go to Z[employer]ast[employer],  centers, shopping, or other public places.    Do Not shake hands.    Avoid close contact with others (hugging, kissing).    Protect Others:    Cover Your Mouth and Nose with a mask, disposable tissue or wash cloth to avoid spreading germs to others.    Wash your hands and face frequently with soap and water.    Fever Medicines:    For fever relief, take acetaminophen or ibuprofen.    Treat fevers above 101  F (38.3  C) to lower fevers and make you more comfortable.     Acetaminophen (e.g., Tylenol): Take 650 mg (two 325 mg pills) by mouth every 4-6 hours as needed of regular strength Tyleno or 1,000 mg (two 500 mg pills) every 8 hours as needed of Extra Strength Tylenol.     Ibuprofen (e.g., Motrin, Advil): Take 400 mg (two 200 mg pills) by  mouth every 6 hours as needed.     Acetaminophen is thought to be safer than ibuprofen or naproxen for people over 65 years old. Acetaminophen is in many OTC and prescription medicines. It might be in more than one medicine that you are taking. You need to be careful and not take an overdose. Before taking any medicine, read all the instructions on the package.    Caution -NSAIDs (e.g., ibuprofen, naproxen): Do not take nonsteroidal anti-inflammatory drugs (NSAIDs) if you have stomach problems, kidney disease, heart failure, or other contraindications to using this type of medicine. Do not take NSAID medicines for over 7 days without consulting your PCP. Do not take NSAID medicines if you are pregnant. Do not take NSAID medicines if you are also taking blood thinners.     Call Back If: Breathing difficulty develops or you become worse.    Thank you for limiting contact with others, wearing a simple mask to cover your cough, practice good hand hygiene habits and accessing our virtual services where possible to limit the spread of this virus.    For more information about COVID19 and options for caring for yourself at home, please visit the CDC website at https://www.cdc.gov/coronavirus/2019-ncov/about/steps-when-sick.html  For more options for care at [facility], please visit our website at [email or web address]

## 2020-03-17 ENCOUNTER — TELEPHONE (OUTPATIENT)
Dept: INTERNAL MEDICINE | Facility: CLINIC | Age: 25
End: 2020-03-17

## 2020-03-17 NOTE — TELEPHONE ENCOUNTER
Message left for Deysi to follow up clinic visit earlier this month. Review imaging results. Callback number provided. Results letter also sent.    Candis Acosta RN (Brasch)

## 2020-03-27 ENCOUNTER — OFFICE VISIT - HEALTHEAST (OUTPATIENT)
Dept: FAMILY MEDICINE | Facility: CLINIC | Age: 25
End: 2020-03-27

## 2020-03-27 DIAGNOSIS — Z20.828 EXPOSURE TO SARS-ASSOCIATED CORONAVIRUS: ICD-10-CM

## 2020-04-20 ENCOUNTER — PRE VISIT (OUTPATIENT)
Dept: GASTROENTEROLOGY | Facility: CLINIC | Age: 25
End: 2020-04-20

## 2020-04-20 ENCOUNTER — PATIENT OUTREACH (OUTPATIENT)
Dept: GASTROENTEROLOGY | Facility: CLINIC | Age: 25
End: 2020-04-20

## 2020-07-08 DIAGNOSIS — Z11.59 ENCOUNTER FOR SCREENING FOR OTHER VIRAL DISEASES: Primary | ICD-10-CM

## 2020-07-13 DIAGNOSIS — Z11.59 ENCOUNTER FOR SCREENING FOR OTHER VIRAL DISEASES: ICD-10-CM

## 2020-07-14 LAB
SARS-COV-2 RNA SPEC QL NAA+PROBE: NOT DETECTED
SPECIMEN SOURCE: NORMAL

## 2020-07-15 ENCOUNTER — HOSPITAL ENCOUNTER (OUTPATIENT)
Facility: AMBULATORY SURGERY CENTER | Age: 25
End: 2020-07-15
Attending: INTERNAL MEDICINE
Payer: COMMERCIAL

## 2020-07-15 VITALS
SYSTOLIC BLOOD PRESSURE: 124 MMHG | RESPIRATION RATE: 16 BRPM | DIASTOLIC BLOOD PRESSURE: 90 MMHG | OXYGEN SATURATION: 97 % | TEMPERATURE: 98.7 F | HEART RATE: 113 BPM

## 2020-07-15 DIAGNOSIS — R10.9 ABDOMINAL PAIN, UNSPECIFIED ABDOMINAL LOCATION: Primary | ICD-10-CM

## 2020-07-15 DIAGNOSIS — K21.00 GASTROESOPHAGEAL REFLUX DISEASE WITH ESOPHAGITIS: Primary | ICD-10-CM

## 2020-07-15 DIAGNOSIS — R11.0 CHRONIC NAUSEA: ICD-10-CM

## 2020-07-15 LAB
HCG UR QL: NEGATIVE
INTERNAL QC OK POCT: YES
UPPER GI ENDOSCOPY: NORMAL

## 2020-07-15 RX ORDER — PANTOPRAZOLE SODIUM 20 MG/1
20 TABLET, DELAYED RELEASE ORAL
Qty: 60 TABLET | Refills: 2 | Status: SHIPPED | OUTPATIENT
Start: 2020-07-15 | End: 2021-04-29

## 2020-07-15 RX ORDER — LIDOCAINE 40 MG/G
CREAM TOPICAL
Status: DISCONTINUED | OUTPATIENT
Start: 2020-07-15 | End: 2020-07-16 | Stop reason: HOSPADM

## 2020-07-15 RX ORDER — FENTANYL CITRATE 50 UG/ML
INJECTION, SOLUTION INTRAMUSCULAR; INTRAVENOUS PRN
Status: DISCONTINUED | OUTPATIENT
Start: 2020-07-15 | End: 2020-07-15 | Stop reason: HOSPADM

## 2020-07-15 RX ORDER — ONDANSETRON 2 MG/ML
4 INJECTION INTRAMUSCULAR; INTRAVENOUS
Status: DISCONTINUED | OUTPATIENT
Start: 2020-07-15 | End: 2020-07-16 | Stop reason: HOSPADM

## 2020-07-15 RX ORDER — SIMETHICONE
LIQUID (ML) MISCELLANEOUS PRN
Status: DISCONTINUED | OUTPATIENT
Start: 2020-07-15 | End: 2020-07-15 | Stop reason: HOSPADM

## 2020-07-16 LAB — COPATH REPORT: NORMAL

## 2020-07-31 ENCOUNTER — TELEPHONE (OUTPATIENT)
Dept: GASTROENTEROLOGY | Facility: CLINIC | Age: 25
End: 2020-07-31

## 2020-07-31 NOTE — TELEPHONE ENCOUNTER
Per Dr. Leventhal pt should follow up with PCP regarding additional questions, Dr. Leventhal only performed the EGD has never seen patient. Call back to patient who voiced understanding.    Claudia Yu LPN  Hepatology Clinic        ---------  [facility]    Phone Message    May a detailed message be left on voicemail: yes     Reason for Call: Other: Pt calling with some follow up questions, about her diagnosis. She would like to talk to Dr Leventhal about this.     Action Taken: Message routed to:  [facility] hepatology    Travel Screening: Not Applicable

## 2021-04-27 NOTE — PROGRESS NOTES
"    Assessment & Plan     Advance care planning      RLQ abdominal pain    - US Pelvic Complete with Transvaginal; Future    Food intolerance    - Allergy adult food panel    Dry eyes    - cromolyn (OPTICROM) 4 % ophthalmic solution; INT 2 GTS IN OU QID    Seasonal allergic rhinitis, unspecified trigger    - fluticasone (FLONASE) 50 MCG/ACT nasal spray; SHAKE LQ AND U 2 SPRAYS IEN QD  - cromolyn (OPTICROM) 4 % ophthalmic solution; INT 2 GTS IN OU QID    Anxiety    - hydrOXYzine (ATARAX) 10 MG tablet; TK 1 TO 3 TS PO QHS    Benign essential hypertension    - metoprolol succinate ER (TOPROL-XL) 25 MG 24 hr tablet; TAKE ONE-HALF TABLET BY MOUTH ONCE DAILY  - Renal panel (Alb, BUN, Ca, Cl, CO2, Creat, Gluc, Phos, K, Na)    Birth control counseling    - CRYSELLE-28 0.3-30 MG-MCG tablet; Take 1 tablet by mouth daily    Gastroesophageal reflux disease with esophagitis without hemorrhage    - omeprazole (PRILOSEC) 20 MG DR capsule; TAKE 1 CAPSULE BY MOUTH TWICE DAILY 30 MINUTES TO 1 HOUR BEFORE A MEAL    Screening for cervical cancer    - Pap imaged thin layer screen reflex to HPV if ASCUS - recommend age 25 - 29    Encounter for hepatitis C screening test for low risk patient    - Hepatitis C Screen Reflex to HCV RNA Quant and Genotype    Screening for human immunodeficiency virus without presence of risk factors    - HIV Antigen Antibody Combo    20 minutes spent on the date of the encounter doing chart review, history and exam, documentation and further activities per the note       BMI:   Estimated body mass index is 32.56 kg/m  as calculated from the following:    Height as of this encounter: 1.65 m (5' 4.96\").    Weight as of this encounter: 88.6 kg (195 lb 6.4 oz).   Weight management plan: joined a gym    See Patient Instructions: discussed we will le there know her lab and imaging results. Follow up as needed for persistent or worsening symptoms.     Return in about 4 weeks (around 5/27/2021), or if symptoms worsen " "or fail to improve.    Selin Martin, P  [facility] JIMMY Jo is a 25 year old who presents for the following health issues     HPI     New Patient/Transfer of Care  Patient would like to discuss the following  1. Ongoing abd pain- hx of RLQ pain, had appendix removed; pain persisted. Went to ER was admitted for bowel infection treated with ABX per pt, developed c-diff which has resolved. RLQ pain persists. She reports sharp intermittent RLQ pain. No change in bowel/bladder.  Has had light vaginal bleeding post intercourse. Bloating after anything she eats.   2. Medication refills. Meds are working well for her. Risks of birth control discussed-DVT (S&S), PE, MI, stroke, death; increased risk with smoking, breast ca.    Review of Systems   Constitutional, HEENT, cardiovascular, pulmonary, GI, , musculoskeletal, neuro, skin, endocrine and psych systems are negative, except as otherwise noted.      Objective    /82   Pulse 86   Temp 98.6  F (37  C) (Tympanic)   Resp 16   Ht 1.65 m (5' 4.96\")   Wt 88.6 kg (195 lb 6.4 oz)   SpO2 100%   BMI 32.56 kg/m    Body mass index is 32.56 kg/m .  Physical Exam   GENERAL: healthy, alert and no distress  EYES: Eyes grossly normal to inspection.  No discharge or erythema, or obvious scleral/conjunctival abnormalities.  RESP: No audible wheeze, cough, or visible cyanosis.  No visible retractions or increased work of breathing.    SKIN: Visible skin clear. No significant rash, abnormal pigmentation or lesions.  NEURO: Cranial nerves grossly intact.  Mentation and speech appropriate for age.  PSYCH: Mentation appears normal, affect normal/bright, judgement and insight intact, normal speech and appearance well-groomed.   (female): normal female external genitalia, normal urethral meatus, vaginal mucosa pink, moist, well rugated, and normal cervix/adnexa/uterus without masses or discharge    See orders            "

## 2021-04-29 ENCOUNTER — OFFICE VISIT (OUTPATIENT)
Dept: FAMILY MEDICINE | Facility: CLINIC | Age: 26
End: 2021-04-29
Payer: COMMERCIAL

## 2021-04-29 VITALS
BODY MASS INDEX: 32.55 KG/M2 | RESPIRATION RATE: 16 BRPM | DIASTOLIC BLOOD PRESSURE: 82 MMHG | SYSTOLIC BLOOD PRESSURE: 131 MMHG | OXYGEN SATURATION: 100 % | HEART RATE: 86 BPM | HEIGHT: 65 IN | TEMPERATURE: 98.6 F | WEIGHT: 195.4 LBS

## 2021-04-29 DIAGNOSIS — I10 BENIGN ESSENTIAL HYPERTENSION: ICD-10-CM

## 2021-04-29 DIAGNOSIS — K90.49 FOOD INTOLERANCE: ICD-10-CM

## 2021-04-29 DIAGNOSIS — Z11.59 ENCOUNTER FOR HEPATITIS C SCREENING TEST FOR LOW RISK PATIENT: ICD-10-CM

## 2021-04-29 DIAGNOSIS — R10.31 RLQ ABDOMINAL PAIN: ICD-10-CM

## 2021-04-29 DIAGNOSIS — Z11.4 SCREENING FOR HUMAN IMMUNODEFICIENCY VIRUS WITHOUT PRESENCE OF RISK FACTORS: ICD-10-CM

## 2021-04-29 DIAGNOSIS — F41.9 ANXIETY: ICD-10-CM

## 2021-04-29 DIAGNOSIS — J30.2 SEASONAL ALLERGIC RHINITIS, UNSPECIFIED TRIGGER: ICD-10-CM

## 2021-04-29 DIAGNOSIS — Z71.89 ADVANCE CARE PLANNING: Primary | ICD-10-CM

## 2021-04-29 DIAGNOSIS — Z30.09 BIRTH CONTROL COUNSELING: ICD-10-CM

## 2021-04-29 DIAGNOSIS — Z12.4 SCREENING FOR CERVICAL CANCER: ICD-10-CM

## 2021-04-29 DIAGNOSIS — H04.123 DRY EYES: ICD-10-CM

## 2021-04-29 DIAGNOSIS — K21.00 GASTROESOPHAGEAL REFLUX DISEASE WITH ESOPHAGITIS WITHOUT HEMORRHAGE: ICD-10-CM

## 2021-04-29 LAB
ALBUMIN SERPL-MCNC: 3.9 G/DL (ref 3.4–5)
ANION GAP SERPL CALCULATED.3IONS-SCNC: 6 MMOL/L (ref 3–14)
BUN SERPL-MCNC: 9 MG/DL (ref 7–30)
CALCIUM SERPL-MCNC: 9.2 MG/DL (ref 8.5–10.1)
CHLORIDE SERPL-SCNC: 107 MMOL/L (ref 94–109)
CO2 SERPL-SCNC: 22 MMOL/L (ref 20–32)
CREAT SERPL-MCNC: 0.78 MG/DL (ref 0.52–1.04)
GFR SERPL CREATININE-BSD FRML MDRD: >90 ML/MIN/{1.73_M2}
GLUCOSE SERPL-MCNC: 78 MG/DL (ref 70–99)
HCV AB SERPL QL IA: NONREACTIVE
HIV 1+2 AB+HIV1 P24 AG SERPL QL IA: NONREACTIVE
PHOSPHATE SERPL-MCNC: 3.4 MG/DL (ref 2.5–4.5)
POTASSIUM SERPL-SCNC: 4.3 MMOL/L (ref 3.4–5.3)
SODIUM SERPL-SCNC: 135 MMOL/L (ref 133–144)

## 2021-04-29 PROCEDURE — G0145 SCR C/V CYTO,THINLAYER,RESCR: HCPCS | Performed by: NURSE PRACTITIONER

## 2021-04-29 PROCEDURE — 86003 ALLG SPEC IGE CRUDE XTRC EA: CPT | Performed by: NURSE PRACTITIONER

## 2021-04-29 PROCEDURE — 99214 OFFICE O/P EST MOD 30 MIN: CPT | Performed by: NURSE PRACTITIONER

## 2021-04-29 PROCEDURE — 86803 HEPATITIS C AB TEST: CPT | Performed by: NURSE PRACTITIONER

## 2021-04-29 PROCEDURE — 82785 ASSAY OF IGE: CPT | Performed by: NURSE PRACTITIONER

## 2021-04-29 PROCEDURE — 80069 RENAL FUNCTION PANEL: CPT | Performed by: NURSE PRACTITIONER

## 2021-04-29 PROCEDURE — 87389 HIV-1 AG W/HIV-1&-2 AB AG IA: CPT | Performed by: NURSE PRACTITIONER

## 2021-04-29 PROCEDURE — 36415 COLL VENOUS BLD VENIPUNCTURE: CPT | Performed by: NURSE PRACTITIONER

## 2021-04-29 RX ORDER — NORGESTREL-ETHINYL ESTRADIOL 0.3-0.03MG
1 TABLET ORAL DAILY
Qty: 90 TABLET | Refills: 3 | Status: SHIPPED | OUTPATIENT
Start: 2021-04-29 | End: 2022-01-17

## 2021-04-29 RX ORDER — NORGESTREL-ETHINYL ESTRADIOL 0.3-0.03MG
1 TABLET ORAL DAILY
COMMUNITY
Start: 2021-02-24 | End: 2021-04-29

## 2021-04-29 RX ORDER — HYDROXYZINE HYDROCHLORIDE 10 MG/1
TABLET, FILM COATED ORAL
Qty: 180 TABLET | Refills: 3 | Status: SHIPPED | OUTPATIENT
Start: 2021-04-29 | End: 2022-06-27

## 2021-04-29 RX ORDER — CROMOLYN SODIUM 40 MG/ML
SOLUTION/ DROPS OPHTHALMIC
Qty: 10 ML | Refills: 10 | Status: SHIPPED | OUTPATIENT
Start: 2021-04-29 | End: 2022-08-04

## 2021-04-29 RX ORDER — METOPROLOL SUCCINATE 25 MG/1
TABLET, EXTENDED RELEASE ORAL
COMMUNITY
Start: 2021-04-02 | End: 2021-04-29

## 2021-04-29 RX ORDER — FLUTICASONE PROPIONATE 50 MCG
SPRAY, SUSPENSION (ML) NASAL
Qty: 16 G | Refills: 11 | Status: SHIPPED | OUTPATIENT
Start: 2021-04-29 | End: 2022-06-27

## 2021-04-29 RX ORDER — METOPROLOL SUCCINATE 25 MG/1
TABLET, EXTENDED RELEASE ORAL
Qty: 90 TABLET | Refills: 3 | Status: SHIPPED | OUTPATIENT
Start: 2021-04-29 | End: 2022-05-13

## 2021-04-29 ASSESSMENT — PATIENT HEALTH QUESTIONNAIRE - PHQ9
SUM OF ALL RESPONSES TO PHQ QUESTIONS 1-9: 0
5. POOR APPETITE OR OVEREATING: NOT AT ALL

## 2021-04-29 ASSESSMENT — ANXIETY QUESTIONNAIRES
GAD7 TOTAL SCORE: 0
5. BEING SO RESTLESS THAT IT IS HARD TO SIT STILL: NOT AT ALL
7. FEELING AFRAID AS IF SOMETHING AWFUL MIGHT HAPPEN: NOT AT ALL
2. NOT BEING ABLE TO STOP OR CONTROL WORRYING: NOT AT ALL
3. WORRYING TOO MUCH ABOUT DIFFERENT THINGS: NOT AT ALL
1. FEELING NERVOUS, ANXIOUS, OR ON EDGE: NOT AT ALL
6. BECOMING EASILY ANNOYED OR IRRITABLE: NOT AT ALL

## 2021-04-29 ASSESSMENT — MIFFLIN-ST. JEOR: SCORE: 1621.59

## 2021-04-29 NOTE — PATIENT INSTRUCTIONS
Patient Education     Unknown Causes of Abdominal Pain (Female)    The exact cause of your belly (abdominal) pain is not clear. This does not mean that this is something to worry about. Everyone likes to know the exact cause of the problem. But sometimes with belly pain, there is no clear-cut cause, and this could be a good thing. The good news is that your symptoms can be treated, and you will feel better.   Your condition does not seem serious now. But sometimes the signs of a serious problem may take more time to appear. For this reason, it is important for you to watch for any new symptoms, problems, or worsening of your condition.  Over the next few days, the abdominal pain may come and go. Or it may be constant. Other common symptoms can include nausea and vomiting. Sometimes it can be difficult to tell if you feel nauseous. You may just feel bad and not connect that feeling to nausea. Constipation, diarrhea, and a fever may go along with the pain.  The pain may continue even if treated correctly over the following days. Depending on how things go, sometimes the cause can become clear and may need more or different treatment. Additional evaluations, medicines, or tests may also be needed.  Home care  Your healthcare provider may prescribe medicine for pain, symptoms, or an infection.  Follow the healthcare provider's instructions for taking these medicines.  General care    Rest as much as you can until your next exam. No strenuous activities.    Try to find positions that ease discomfort. A small pillow placed on the abdomen may help relieve pain.    Something warm on your abdomen (such as a heating pad) may help, but be careful not to burn yourself.  Diet    Don t force yourself to eat, especially if having cramps, vomiting, or diarrhea.    Water is important so you don't get dehydrated. Soup may also be good. Sports drinks may also help, especially if they are not too acidic. Don't drink sugary drinks as  this can make things worse. Take liquids in small amounts. Don t guzzle them.    Caffeine sometimes makes the pain and cramping worse.    Don t take dairy products if you have vomiting or diarrhea.    Don't eat large amounts at a time. Wait a few minutes between bites.    Eat a diet low in fiber (called a low-residue diet). Foods allowed include refined breads, white rice, fruit and vegetable juices without pulp, tender meats. These foods will pass more easily through the intestine.    Don t have whole-grain foods, whole fruits and vegetables, meats, seeds and nuts, fried or fatty foods, dairy, alcohol and spicy foods until your symptoms go away.  Follow-up care  Follow up with your healthcare provider, or as advised, if your pain does not begin to improve in the next 24 hours.  Call 911  Call 911 if any of these occur:    Trouble breathing    Confusion    Fainting or loss of consciousness    Rapid heart rate    Seizure  When to seek medical advice  Call your healthcare provider right away if any of these occur:    Pain gets worse or moves to the right lower abdomen    New or worsening vomiting or diarrhea    Swelling of the abdomen    Unable to pass stool for more than 3 days    Fever of 100.4 F (38 C) or higher, or as directed by your healthcare provider.    Blood in vomit or bowel movements (dark red or black color)    Yellow color of eyes and skin (jaundice)    Weakness, dizziness    Chest, arm, back, neck, or jaw pain    Unexpected vaginal bleeding or missed period    Can't keep down liquids or water and you are getting dehydrated  [employer] last reviewed this educational content on 6/1/2018 2000-2021 [employer]. All rights reserved. This information is not intended as a substitute for professional medical care. Always follow your healthcare professional's instructions.           Patient Education     What Is Endometriosis?  Endometriosis is a problem that affects your reproductive organs and  menstrual cycle. It can cause cramps and pain during your periods. Or you may have pelvic pain the whole month. If you have this problem and it s not treated, it can affect your health. But with early diagnosis and treatment, it can be managed.      The endometrium lines the uterus. It thickens every month to prepare for a pregnancy.      With endometriosis, endometrial tissue grows outside of your uterus.   Understanding endometriosis  With endometriosis, tissue inside the uterus begins to grow where it should not. This endometrial tissue can also grow on the ovaries, the bowels, or on the walls of your pelvis. During your period, this extra tissue swells with blood. The tissue may also release tiny drops of blood. The swelling and blood irritate nearby tissues. This causes pain and cramps. This irritation may cause scar tissue to form. This scar tissue can bind organs together. It can also cause problems getting pregnant (infertility).    Common symptoms  If you have endometriosis, you may have one or more of these symptoms:     Cramps and menstrual pain    Pelvic pain    Pain during sex    Painful bowel movements    Trouble getting pregnant (infertility)  Treatment options  Treatment may help relieve pain. It may also help restore fertility. Options include medical therapy, surgery, or both. Medicine may also help relieve some of your symptoms. Talk with your healthcare provider about these options.   Painful sex  Endometriosis can cause pain during sex. Try to see if sex during certain times of the month is less painful for you. Certain positions may also cause pain. Find out which positions reduce pain for you.   [employer] last reviewed this educational content on 5/1/2020 2000-2021 [employer]. All rights reserved. This information is not intended as a substitute for professional medical care. Always follow your healthcare professional's instructions.           Patient Education      Diverticulitis    Some people get pouches along the wall of the colon as they get older. These pouches are called diverticuli. They often cause no symptoms. If the pouches become blocked, you can get an infection. This infection is called diverticulitis. It causes pain in your lower belly (abdomen) and fever. It may also cause nausea, vomiting, diarrhea, or constipation. If not treated, it can become a serious health problem. It can cause an abscess to form inside the pouch. The abscess may block the intestinal tract. It may even rupture, spreading infection throughout the belly.   When treatment is started early, oral antibiotics alone may be enough to cure diverticulitis. This method is tried first. But if you don't improve or if your condition gets worse while using these medicines, you may need to be admitted to the hospital. There, you will get antibiotics through an IV. You may also have to rest your bowel. That means you won't eat or drink for a period of time. Severe cases may need surgery.   Home care  These guidelines will help you care for yourself at home:     During the acute illness, rest and follow your healthcare provider's instructions about diet. Sometimes you will need to be on a clear liquid diet to rest your bowel. Once your symptoms are better, you may be told to eat a low-fiber diet for some time. You can eat foods such as:  ? Flake cereal  ? Mashed potatoes  ? Pancakes and waffles  ? Pasta  ? White bread  ? Rice  ? Applesauce  ? Bananas  ? Eggs  ? Fish  ? Poultry  ? Tofu  ? Cooked soft vegetables    Take antibiotics exactly as told. Don't miss any doses or stop taking the medicine, even if you feel better.    Monitor your temperature. Tell your healthcare provider if you have a rising temperature.  Preventing future attacks  Once you have an episode of diverticulitis, you are at risk for having it again. But you may be able to lower your risk by eating a high-fiber diet (20 g/day to 35  g/day of fiber). This cleans out the colon pouches that already exist. It may also prevent new ones from forming. Foods high in fiber include:     Fresh fruits and edible peelings    Raw or lightly cooked vegetables    Whole-grain cereals and breads    Dried beans and peas    Bran  Here are other steps you can take to help prevent future attacks:     Take your medicines, such as antibiotics, as your healthcare provider says.    Drink 6 to 8 glasses of water every day, unless told otherwise.    Use a heating pad or hot water bottle to help belly cramping or pain.    Start an exercise program. Ask your healthcare provider how to get started. You can benefit from simple activities such as walking or gardening.    Treat diarrhea with a bland diet. Start with liquids only. Then slowly add fiber over time.    Watch for changes in your bowel movements (constipation to diarrhea). Prevent constipation by eating a high-fiber diet and taking a stool softener if needed.    Get plenty of rest and sleep.  Follow-up care  Check in with your healthcare provider as advised or sooner if you are not getting better in the next 2 days.   When to call your healthcare provider   Call your healthcare provider right away if any of these occur:    Fever of 100.4 F (38 C) or higher, or as directed by your healthcare provider    Repeated vomiting or swelling of the belly    Weakness, dizziness, light-headedness    Pain in your belly that gets worse, is severe, or spreads to your back    Pain that moves to the right lower belly    Rectal bleeding (stools that are red, black, or maroon in color)    Unexpected vaginal bleeding  [employer] last reviewed this educational content on 8/1/2019 2000-2021 [employer]. All rights reserved. This information is not intended as a substitute for professional medical care. Always follow your healthcare professional's instructions.           Patient Education     Ovarian Cysts  A cyst is often a  fluid-filled sac, like a small water balloon. Cysts are almost always harmless, and many go away on their own. Often they grow slowly. They can vary in size from as small as a pea to larger than a grapefruit. Many cause no symptoms at all. Often they are felt only during a pelvic exam. Ovarian cysts are often not cancer (benign).        Functional cyst  A functional cyst is the most common kind of cyst. It forms when a follicle doesn't release a mature egg or continues to grow after releasing the egg. Functional cysts often occur on only one ovary at a time. They often shrink on their own in 1 to 3 months. In rare cases, a cyst will break open (rupture), causing pain. Pain might also be caused by the twisting of an ovary that is enlarged because of the cyst growing on it.      Dermoid cyst  Sometimes cells that are present from birth will start to grow into different kinds of tissue such as skin, fat, hair, and teeth. This kind of cyst is called a dermoid cyst. Dermoid cysts can grow on one or both ovaries. Often they cause no symptoms. But if they leak or the ovary becomes twisted, they can cause severe pain.     Endometrioma  Sometimes tissue similar to the lining of the uterus (endometrium) grows and becomes part of the ovary. This kind of cyst is often called a chocolate cyst because of its dark-brown color. These cysts can grow on one or both ovaries. They often cause pain, especially around menstruation or during sex.     Benign cystadenoma  If the capsule around the ovary grows, it can form a cystadenoma. These cysts can grow on one or both ovaries. Often they cause no symptoms if they are small. But if they become large, they can press on organs near the ovaries, causing pain. They can also cause pain by stretching the ovarian capsule. A cyst that pushes on the bladder can cause frequent urination. Sometimes these cysts break open and bleed.   Cancer (malignant) cysts  These cysts can invade other tissues or  spread to other parts of the body.   [employer] last reviewed this educational content on 2/1/2021 2000-2021 [employer]. All rights reserved. This information is not intended as a substitute for professional medical care. Always follow your healthcare professional's instructions.

## 2021-04-30 ASSESSMENT — ANXIETY QUESTIONNAIRES: GAD7 TOTAL SCORE: 0

## 2021-05-03 ENCOUNTER — ANCILLARY PROCEDURE (OUTPATIENT)
Dept: ULTRASOUND IMAGING | Facility: CLINIC | Age: 26
End: 2021-05-03
Attending: NURSE PRACTITIONER
Payer: COMMERCIAL

## 2021-05-03 DIAGNOSIS — R10.31 RLQ ABDOMINAL PAIN: ICD-10-CM

## 2021-05-03 LAB
ALMOND IGE QN: <0.1 KU(A)/L
CASHEW NUT IGE QN: <0.1 KU(A)/L
CODFISH IGE QN: <0.1 KU(A)/L
COW MILK IGE QN: <0.1 KU(A)/L
EGG WHITE IGE QN: <0.1 KU(A)/L
HAZELNUT IGE QN: <0.1 KU(A)/L
IGE SERPL-ACNC: 47 KIU/L (ref 0–114)
PEANUT IGE QN: <0.1 KU(A)/L
SALMON IGE QN: <0.1 KU(A)/L
SCALLOP IGE QN: <0.1 KU(A)/L
SESAME SEED IGE QN: <0.1 KU(A)/L
SHRIMP IGE QN: 0.31 KU(A)/L
SOYBEAN IGE QN: <0.1 KU(A)/L
TUNA IGE QN: <0.1 KU(A)/L
WALNUT IGE QN: <0.1 KU(A)/L
WHEAT IGE QN: <0.1 KU(A)/L

## 2021-05-04 ENCOUNTER — MYC MEDICAL ADVICE (OUTPATIENT)
Dept: FAMILY MEDICINE | Facility: CLINIC | Age: 26
End: 2021-05-04

## 2021-05-04 DIAGNOSIS — R93.89 ABNORMAL PELVIC ULTRASOUND: Primary | ICD-10-CM

## 2021-05-04 DIAGNOSIS — R10.31 RLQ ABDOMINAL PAIN: ICD-10-CM

## 2021-05-04 LAB
COPATH REPORT: NORMAL
PAP: NORMAL

## 2021-05-04 NOTE — RESULT ENCOUNTER NOTE
Scotty Jo,    Thank you for your recent office visit.    Here are your recent results.  Per radiologist-     IMPRESSION:  1. There is an isoechoic/mildly hyperechoic structure in the  endometrial canal measuring 1.9 x 0.6 x 0.9 cm. This could represent  thrombus, polyp, mass, or other etiology. I recommend close interval  follow-up ultrasound in one month to ensure resolution of this  finding. Alternatively, hysteroscopy could be performed.  2. No other significant abnormalities are identified.    Do you want me to order a future ultrasound in one month or do you want to discuss findings further with OBGYN?    Feel free to contact me via [employer] or call the clinic at [phone].    Sincerely,    Selin Martin, SAHARA, FNP-BC

## 2021-05-04 NOTE — RESULT ENCOUNTER NOTE
Scotty Jo,    Thank you for your recent office visit.    Here are your recent results.  Blood labs are normal, with exception of a shrimp allergy.   Feel free to contact me via [employer] or call the clinic at [phone].    Sincerely,    SAHARA Paez, FNP-BC

## 2021-05-18 ENCOUNTER — OFFICE VISIT (OUTPATIENT)
Dept: OBGYN | Facility: CLINIC | Age: 26
End: 2021-05-18
Payer: COMMERCIAL

## 2021-05-18 VITALS
WEIGHT: 197 LBS | BODY MASS INDEX: 32.82 KG/M2 | DIASTOLIC BLOOD PRESSURE: 88 MMHG | SYSTOLIC BLOOD PRESSURE: 135 MMHG | HEART RATE: 79 BPM

## 2021-05-18 DIAGNOSIS — N94.89 ENDOMETRIAL MASS: ICD-10-CM

## 2021-05-18 DIAGNOSIS — R10.2 PELVIC PAIN IN FEMALE: Primary | ICD-10-CM

## 2021-05-18 LAB
SPECIMEN SOURCE: NORMAL
WET PREP SPEC: NORMAL

## 2021-05-18 PROCEDURE — 87591 N.GONORRHOEAE DNA AMP PROB: CPT | Performed by: OBSTETRICS & GYNECOLOGY

## 2021-05-18 PROCEDURE — 87491 CHLMYD TRACH DNA AMP PROBE: CPT | Performed by: OBSTETRICS & GYNECOLOGY

## 2021-05-18 PROCEDURE — 87210 SMEAR WET MOUNT SALINE/INK: CPT | Performed by: OBSTETRICS & GYNECOLOGY

## 2021-05-18 PROCEDURE — 99204 OFFICE O/P NEW MOD 45 MIN: CPT | Performed by: OBSTETRICS & GYNECOLOGY

## 2021-05-18 NOTE — PATIENT INSTRUCTIONS
If you have any questions regarding your visit, Please contact your care team.  [facility]: [phone]  Women s Health CLINIC HOURS TELEPHONE NUMBER   Cephas Agbeh, M.D. Becky-RN Kylie-RN Heidi-RN Deanna-MA Angela-  Lucie-         Monday-[place]    8:00a.m-4:45 p.m    Tuesday--[place]     8:00a.m-4:45 p.m.    Thursday-[place]    8:00a.m-4:45 p.m.    Friday-[place]    8:00a.m-4:45 p.m    [facility]   [address]   [place] [address]   [phone]   Fax [phone]   Erickhr-[phone]     [facility]   [address] [place] [address] [place] [address]   [phone]    [facility]  [address] [facility] [address]  [phone]  Gevbsri-[phone]   Urgent Care locations:    [facility] Monday-Friday  5 pm - 9 pm  Saturday and Sunday   9 am - 5 pm   Monday-Friday   5 pm - 9 pm  Saturday and Sunday  9 am - 5 pm    [phone]   If you need a medication refill, please contact your pharmacy. Please allow 3 business days for your refill to be completed.  As always, Thank you for trusting us with your healthcare needs!

## 2021-05-18 NOTE — PROGRESS NOTES
Deysi is a 25 year old  referred here by MARY SALDANA for consultation regarding chronic pelvic pain and recent pelvic ultrasound as bellow with endometrial findings..  She has had a history of chronic pelvic pain for at least 6-7 years. She has been on OCP that regulates her menses and pain  6 months ago she developed severe RLQ pain and underwent laparoscopic appendectomy. The pain still persisted postop. She was treated with antibiotics for presumed infection and had a follow up with GI. Colonoscopy was normal per patient.    She agrres to PID /STD and wet prep testing.    ULTRASOUND PELVIC COMPLETE WITH TRANSVAGINAL IMAGING  5/3/2021 8:00 AM     CLINICAL HISTORY: Right lower quadrant pain, had appendix removed,  pain persisted, was treated for bowel infection, pain persisting. RLQ  abdominal pain.     TECHNIQUE: Transabdominal scans were performed. Endovaginal ultrasound  was performed to better visualize the adnexa.     COMPARISON: CT abdomen and pelvis from [facility] dated 2016  and pelvic ultrasound from [facility] dated 2015.     FINDINGS:     UTERUS: Uterus is retroverted measuring 6.9 x 4.5 x 3.3 cm.  Endometrial stripe measures 0.2 cm in thickness, although there does  appear to be a structure in the endometrial canal measuring 1.9 x 0.9  x 0.6 cm which could represent a thrombus, polyp, mass, or other  etiology. I do not definitely see internal vascularity in this  structure. This was not seen on the prior study. Uterus is otherwise  of normal morphology and appearance.     RIGHT OVARY: 3.2 x 2.5 x 2.1 cm. Multiple small follicles noted.     LEFT OVARY: 2.7 x 2.1 x 2.1 cm. Multiple small follicles are noted.     No significant free fluid.                                                                      IMPRESSION:  1. There is an isoechoic/mildly hyperechoic structure in the  endometrial canal measuring 1.9 x 0.6 x 0.9 cm. This could represent  thrombus, polyp, mass, or  other etiology. I recommend close interval  follow-up ultrasound in one month to ensure resolution of this  finding. Alternatively, hysteroscopy could be performed.  2. No other significant abnormalities are identified.     RAKESH SOLIS MD  ROS: Ten point review of systems was reviewed and negative except the above.    Gyne: - abn pap (last pap ), - STD's    History reviewed. No pertinent past medical history.  Past Surgical History:   Procedure Laterality Date     appenctomy Right 12/17/2020     ESOPHAGOSCOPY, GASTROSCOPY, DUODENOSCOPY (EGD), COMBINED N/A 07/15/2020    Procedure: ESOPHAGOGASTRODUODENOSCOPY, WITH BIOPSY;  Surgeon: Leventhal, Thomas Michael, MD;  Location: [facility] OR     Patient Active Problem List   Diagnosis     Anxiety     Encounter for other contraceptive management     Palpitations     Benign essential hypertension       ALL/Meds: Her medication and allergy histories were reviewed and are documented in their appropriate chart areas.    SH: - tob, - EtOH,     FH: Her family history was reviewed and documented in its appropriate chart area.    PE: /88   Pulse 79   Wt 89.4 kg (197 lb)   LMP 04/29/2021 (Exact Date)   Breastfeeding No   BMI 32.82 kg/m    Body mass index is 32.82 kg/m .    General Appearance:  healthy, alert, active, no distress  HEENT: NCAT  Abdomen: Soft, nontender.  Normal bowel sounds.  No masses  Pelvic:       - Ext: NEFG,        - Urethra: no lesions, no masses, no hypermobility       - Urethral Meatus: normal appearance,        - Bladder: no tenderness, no masses       - Vagina: pink, moist, normal rugae, no lesions, no discharge       - Cervix: no lesions, parous       - Uterus: normal sized, RVMidplane, mobile, no contour       - Adnexa: no masses, min tenderness       - Rectal: deferred,       - Pelvic support: no cystocele, no rectocele, no uterine prolapse  Nurse for exam.  A/P    ICD-10-CM    1. Pelvic pain in female  R10.2 US Pelvic Complete with Transvaginal      Wet prep     Neisseria gonorrhoeae PCR     Chlamydia trachomatis PCR     UA with Microscopic reflex to Culture   2. Endometrial mass  N94.89 US Pelvic Complete with Transvaginal     Wet prep     Neisseria gonorrhoeae PCR     Chlamydia trachomatis PCR     UA with Microscopic reflex to Culture      We discussed chronic nature of her pains ACOG handout given to patient.  Discussed use of hormonal manipulation with ocps, patch, ring, depo,   We also discussed diagnostic laparoscopy/hysteroscopy. if medical treatment fails.  Will await lab results and F/U ultrasound in 4 weeks.    Total time preparing to see patient with reviewing prior encounter and labs, face to face time,  and coordinating care on the same calendar date:45 mins  CEPHAS AGBEH, MD.

## 2021-05-19 LAB
C TRACH DNA SPEC QL NAA+PROBE: NEGATIVE
N GONORRHOEA DNA SPEC QL NAA+PROBE: NEGATIVE
SPECIMEN SOURCE: NORMAL
SPECIMEN SOURCE: NORMAL

## 2021-06-07 NOTE — PROGRESS NOTES
SUBJECTIVE: Here for curbside evaluation for COVID-19 referred through [facility]. Confirmed [facility] bad with name and date of birth for specimen collection.   Patient reports no new symptoms.     OBJECTIVE:   In no apparent distress  Eyes appear normal  Mucous membranes moist  Non diaphoretic   No increased work of breathing   Mental status appears normal/affect normal       No diagnosis found. Over the counter meds and isolation discussed until results in from [facility] team.  Brief education provided. Time of visit was 15 minutes more than half in coordination of care and counseling regarding COVID and self-isolation.

## 2021-06-20 ENCOUNTER — HEALTH MAINTENANCE LETTER (OUTPATIENT)
Age: 26
End: 2021-06-20

## 2021-06-30 ENCOUNTER — ANCILLARY PROCEDURE (OUTPATIENT)
Dept: ULTRASOUND IMAGING | Facility: CLINIC | Age: 26
End: 2021-06-30
Attending: OBSTETRICS & GYNECOLOGY
Payer: COMMERCIAL

## 2021-06-30 ENCOUNTER — RECORDS - HEALTHEAST (OUTPATIENT)
Dept: SCHEDULING | Facility: CLINIC | Age: 26
End: 2021-06-30

## 2021-06-30 ENCOUNTER — NURSE TRIAGE (OUTPATIENT)
Dept: NURSING | Facility: CLINIC | Age: 26
End: 2021-06-30

## 2021-06-30 DIAGNOSIS — R10.2 PELVIC PAIN IN FEMALE: ICD-10-CM

## 2021-06-30 DIAGNOSIS — N94.89 ENDOMETRIAL MASS: ICD-10-CM

## 2021-06-30 PROCEDURE — 76856 US EXAM PELVIC COMPLETE: CPT | Performed by: RADIOLOGY

## 2021-06-30 PROCEDURE — 76830 TRANSVAGINAL US NON-OB: CPT | Performed by: RADIOLOGY

## 2021-06-30 NOTE — TELEPHONE ENCOUNTER
Mom calling regarding imaging that was done earlier today. Not with patient advised to call back if needed.  Jennifer Gonzalez RN [facility]        Reason for Disposition    [1] Follow-up call to recent contact AND [2] information only call, no triage required    Protocols used: INFORMATION ONLY CALL - NO TRIAGE-A-

## 2021-07-01 DIAGNOSIS — N94.89 ENDOMETRIAL MASS: Primary | ICD-10-CM

## 2021-07-04 NOTE — TELEPHONE ENCOUNTER
Telephone Encounter by Joel Gonzalez RN at 6/30/2021  5:49 PM     Author: Joel Gonzalez RN Service: -- Author Type: Registered Nurse    Filed: 6/30/2021  6:00 PM Encounter Date: 6/30/2021 Status: Signed    : Joel Gonzalez RN (Registered Nurse)       Mom calling regarding imaging that was done earlier today. Not with patient advised to call back if needed.

## 2021-07-05 ENCOUNTER — MYC MEDICAL ADVICE (OUTPATIENT)
Dept: FAMILY MEDICINE | Facility: CLINIC | Age: 26
End: 2021-07-05

## 2021-07-06 ENCOUNTER — VIRTUAL VISIT (OUTPATIENT)
Dept: FAMILY MEDICINE | Facility: CLINIC | Age: 26
End: 2021-07-06
Payer: COMMERCIAL

## 2021-07-06 DIAGNOSIS — H10.32 ACUTE BACTERIAL CONJUNCTIVITIS OF LEFT EYE: Primary | ICD-10-CM

## 2021-07-06 PROCEDURE — 99213 OFFICE O/P EST LOW 20 MIN: CPT | Mod: 95 | Performed by: NURSE PRACTITIONER

## 2021-07-06 RX ORDER — POLYMYXIN B SULFATE AND TRIMETHOPRIM 1; 10000 MG/ML; [USP'U]/ML
SOLUTION OPHTHALMIC
Qty: 10 ML | Refills: 0 | Status: SHIPPED | OUTPATIENT
Start: 2021-07-06 | End: 2021-11-11

## 2021-07-06 ASSESSMENT — ENCOUNTER SYMPTOMS
FEVER: 0
EYE REDNESS: 1
FATIGUE: 0
EYE DISCHARGE: 1
EYE ITCHING: 1
CHILLS: 0

## 2021-07-06 NOTE — TELEPHONE ENCOUNTER
Saad message sent to patient to schedule appointment.    Deysi KAHNN-RN  Triage Nurse  [facility]: [facility]

## 2021-07-06 NOTE — PROGRESS NOTES
Deysi is a 25 year old who is being evaluated via a billable video visit.      How would you like to obtain your AVS? MyChart  If the video visit is dropped, the invitation should be resent by: Text to cell phone: [phone]  Will anyone else be joining your video visit? No  Video Start Time: 9:05 AM    Assessment & Plan     Acute bacterial conjunctivitis of left eye  Educated on use of drops.  Notify if no improvement over the next week.  May start to place in right eye if develops symptoms.  - trimethoprim-polymyxin b (POLYTRIM) 13849-5.1 UNIT/ML-% ophthalmic solution; Apply 1 drop in left eye three times per day. Use for 2 days after the redness is gone. Typically 5-7 days total.    Prescription drug management  15 minutes spent on the date of the encounter doing chart review, history and exam, documentation and further activities per the note      Return in about 1 week (around 7/13/2021), or if symptoms worsen or fail to improve.    SAHARA Foreman [facility] [place]   Deysi is a 25 year old who presents for the following health issues     HPI     Eye(s) Problem  Onset/Duration: 2 days  Description:   Location: left eye  Pain: irritation  Redness: YES  Accompanying Signs & Symptoms:  Discharge/mattering: YES- yellow  Swelling: YES  Visual changes: YES- blurred vision  Fever: no  Nasal Congestion: no  Bothered by bright lights: no  History:  Trauma: no  Foreign body exposure: YES- concerned about bacteria getting in eyes from river water or dog having diarrhea  Wearing contacts: no  Precipitating or alleviating factors: None  Therapies tried and outcome: cleaning eye with water and allergy eye drops not effective    Video visit completed due to COVID 19 outbreak.     Left eye has been irritated and has been having yellowish drainage for the last couple of days.  Has been having blurred vision.  Blurred vision clears once discharge is removed.  Left eye does feel irritated  and itchy.  No fever that is aware of. Has been doing some allergy eye drops and flushing eye, is not really helping at all.     Review of Systems   Constitutional: Negative for chills, fatigue and fever.   Eyes: Positive for discharge, redness, itching and visual disturbance (blurred).         Objective           Vitals:  No vitals were obtained today due to virtual visit.    Physical Exam  Constitutional:       Appearance: Normal appearance.   HENT:      Nose: No congestion.   Eyes:      General: Lids are normal.      Conjunctiva/sclera:      Right eye: Right conjunctiva is not injected.      Left eye: Left conjunctiva is injected.   Pulmonary:      Effort: No tachypnea, bradypnea or respiratory distress.   Skin:     Coloration: Skin is not ashen, cyanotic, jaundiced or pale.   Neurological:      Mental Status: She is alert.   Psychiatric:         Mood and Affect: Mood normal.         Speech: Speech normal.         Behavior: Behavior normal.              Video-Visit Details    Type of service:  Video Visit    Video End Time:9:13 AM    Originating Location (pt. Location): Home    Distant Location (provider location):  [facility] JIM[facility]     Platform used for Video Visit: [employer]

## 2021-08-17 ENCOUNTER — APPOINTMENT (OUTPATIENT)
Dept: URBAN - METROPOLITAN AREA CLINIC 252 | Age: 26
Setting detail: DERMATOLOGY
End: 2021-08-17

## 2021-08-17 VITALS — WEIGHT: 190 LBS | RESPIRATION RATE: 16 BRPM | HEIGHT: 66 IN

## 2021-08-17 DIAGNOSIS — D22 MELANOCYTIC NEVI: ICD-10-CM

## 2021-08-17 DIAGNOSIS — L81.4 OTHER MELANIN HYPERPIGMENTATION: ICD-10-CM

## 2021-08-17 DIAGNOSIS — D485 NEOPLASM OF UNCERTAIN BEHAVIOR OF SKIN: ICD-10-CM

## 2021-08-17 DIAGNOSIS — Z71.89 OTHER SPECIFIED COUNSELING: ICD-10-CM

## 2021-08-17 PROBLEM — D22.62 MELANOCYTIC NEVI OF LEFT UPPER LIMB, INCLUDING SHOULDER: Status: ACTIVE | Noted: 2021-08-17

## 2021-08-17 PROBLEM — D23.62 OTHER BENIGN NEOPLASM OF SKIN OF LEFT UPPER LIMB, INCLUDING SHOULDER: Status: ACTIVE | Noted: 2021-08-17

## 2021-08-17 PROBLEM — D48.5 NEOPLASM OF UNCERTAIN BEHAVIOR OF SKIN: Status: ACTIVE | Noted: 2021-08-17

## 2021-08-17 PROCEDURE — OTHER PATHOLOGY BILLING: OTHER

## 2021-08-17 PROCEDURE — OTHER BIOPSY BY SHAVE METHOD: OTHER

## 2021-08-17 PROCEDURE — 88305 TISSUE EXAM BY PATHOLOGIST: CPT

## 2021-08-17 PROCEDURE — 99203 OFFICE O/P NEW LOW 30 MIN: CPT | Mod: 25

## 2021-08-17 PROCEDURE — 11102 TANGNTL BX SKIN SINGLE LES: CPT

## 2021-08-17 PROCEDURE — 11103 TANGNTL BX SKIN EA SEP/ADDL: CPT

## 2021-08-17 PROCEDURE — OTHER COUNSELING: OTHER

## 2021-08-17 ASSESSMENT — LOCATION ZONE DERM
LOCATION ZONE: SCALP
LOCATION ZONE: ARM
LOCATION ZONE: TRUNK

## 2021-08-17 ASSESSMENT — LOCATION SIMPLE DESCRIPTION DERM
LOCATION SIMPLE: LEFT UPPER ARM
LOCATION SIMPLE: LEFT FOREARM
LOCATION SIMPLE: RIGHT SCALP
LOCATION SIMPLE: RIGHT UPPER ARM
LOCATION SIMPLE: LEFT UPPER BACK

## 2021-08-17 ASSESSMENT — LOCATION DETAILED DESCRIPTION DERM
LOCATION DETAILED: RIGHT ANTERIOR PROXIMAL UPPER ARM
LOCATION DETAILED: LEFT SUPERIOR UPPER BACK
LOCATION DETAILED: LEFT VENTRAL PROXIMAL FOREARM
LOCATION DETAILED: LEFT ANTERIOR PROXIMAL UPPER ARM
LOCATION DETAILED: RIGHT MEDIAL FRONTAL SCALP

## 2021-08-17 NOTE — PROCEDURE: BIOPSY BY SHAVE METHOD
Hemostasis: Drysol
Cryotherapy Text: The wound bed was treated with cryotherapy after the biopsy was performed.
Validate Lesion Size: No
Information: Selecting Yes will display possible errors in your note based on the variables you have selected. This validation is only offered as a suggestion for you. PLEASE NOTE THAT THE VALIDATION TEXT WILL BE REMOVED WHEN YOU FINALIZE YOUR NOTE. IF YOU WANT TO FAX A PRELIMINARY NOTE YOU WILL NEED TO TOGGLE THIS TO 'NO' IF YOU DO NOT WANT IT IN YOUR FAXED NOTE.
Anesthesia Volume In Cc (Will Not Render If 0): 0.5
Billing Type: Client Bill
Electrodesiccation And Curettage Text: The wound bed was treated with electrodesiccation and curettage after the biopsy was performed.
Silver Nitrate Text: The wound bed was treated with silver nitrate after the biopsy was performed.
Notification Instructions: Patient will be notified of biopsy results. However, patient instructed to call the office if not contacted within 2 weeks.
Post-Care Instructions: I reviewed with the patient in detail post-care instructions. Patient is to keep the biopsy site dry overnight, and then apply bacitracin twice daily until healed. Patient may apply hydrogen peroxide soaks to remove any crusting.
Consent: Written consent was obtained and risks were reviewed including but not limited to scarring, infection, bleeding, scabbing, incomplete removal, nerve damage and allergy to anesthesia.
Was A Bandage Applied: Yes
Electrodesiccation Text: The wound bed was treated with electrodesiccation after the biopsy was performed.
Biopsy Type: H and E
Wound Care: Petrolatum
Detail Level: Detailed
Type Of Destruction Used: Curettage
Size Of Lesion In Cm: 0
Biopsy Method: Dermablade
Dressing: bandage
Anesthesia Type: 1% lidocaine with epinephrine
Curettage Text: The wound bed was treated with curettage after the biopsy was performed.
Depth Of Biopsy: dermis

## 2021-08-17 NOTE — PROCEDURE: PATHOLOGY BILLING
Immunohistochemistry ([id number] and [id number]) billing is not performed here. Please use the Immunohistochemistry Stain Billing plan to accomplish this.

## 2021-08-17 NOTE — PROCEDURE: PATHOLOGY BILLING
Immunohistochemistry ([id number] and [id number]) billing is not performed here. Please use the Immunohistochemistry Stain Billing plan to accomplish this. Immunohistochemistry ([id number] and [id number]) billing is not performed here. Please use the Immunohistochemistry Stain Billing plan to accomplish this.

## 2021-08-17 NOTE — HPI: FULL BODY SKIN EXAMINATION
How Severe Are Your Spot(S)?: mild
What Type Of Note Output Would You Prefer (Optional)?: Bullet Format
What Is The Reason For Today's Visit?: Annual Full Body Skin Examination with No Concerns
What Is The Reason For Today's Visit? (Being Monitored For X): the development of a new lesion
Additional History: Tanning a lot this year

## 2021-09-23 NOTE — PROGRESS NOTES
Assessment & Plan     Palpitations    - EKG 12-lead complete w/read - Clinics  - Leadless EKG Monitor 3 to 7 Days; Future  - Echo Stress Test with Definity; Future  - Amylase; Future  - Lipase; Future  - TSH with free T4 reflex; Future  - CBC with platelets and differential; Future  - Comprehensive metabolic panel (BMP + Alb, Alk Phos, ALT, AST, Total. Bili, TP); Future  - Anti Nuclear Yamel IgG by IFA with Reflex; Future  - ESR: Erythrocyte sedimentation rate; Future  - CRP, inflammation; Future    Atypical chest pain    - EKG 12-lead complete w/read - Clinics  - Leadless EKG Monitor 3 to 7 Days; Future  - Echo Stress Test with Definity; Future  - Amylase; Future  - Lipase; Future  - TSH with free T4 reflex; Future  - CBC with platelets and differential; Future  - Comprehensive metabolic panel (BMP + Alb, Alk Phos, ALT, AST, Total. Bili, TP); Future  - Anti Nuclear Yamel IgG by IFA with Reflex; Future  - ESR: Erythrocyte sedimentation rate; Future  - CRP, inflammation; Future    Abdominal pain, generalized    - EKG 12-lead complete w/read - Clinics  - Leadless EKG Monitor 3 to 7 Days; Future  - Echo Stress Test with Definity; Future  - Amylase; Future  - Lipase; Future  - TSH with free T4 reflex; Future  - CBC with platelets and differential; Future  - Comprehensive metabolic panel (BMP + Alb, Alk Phos, ALT, AST, Total. Bili, TP); Future  - Anti Nuclear Yamel IgG by IFA with Reflex; Future  - ESR: Erythrocyte sedimentation rate; Future  - CRP, inflammation; Future  - polyethylene glycol (MIRALAX) 17 GM/Dose powder; Take 17 g (1 capful) by mouth daily    Slow transit constipation    - polyethylene glycol (MIRALAX) 17 GM/Dose powder; Take 17 g (1 capful) by mouth daily    20 minutes spent on the date of the encounter doing chart review, history and exam, documentation and further activities per the note     See Patient Instructions    Return in about 4 weeks (around 10/26/2021), or if symptoms worsen or fail to  "improve.    Selin Martin NP  [facility] JIMMY Jo is a 26 year old who presents for the following health issues     HPI     Following up on: Abdominal discomfort/pain    Last visit this was discussed: 4/29/21 with Selin Martin    Progression of Symptoms:  Symptoms are changing. Reviewed OBGYN/ GI notes. Uterus mass resolving on second pelvic ultrasound, normal colonoscopy.  Pt reports chronic severe RLQ pain. Does have to take Milk of Magnesia once per week for BMs- \"does help to clear her out.\" Reports significant abdomina bloating- \"looks pregnant at times.\"  Feels her heart is not right, palpitations, weird pounding, chest pressure when laying down.  She reports socially drinks ETOH- 2 drinks on weekends. Has tried diet changes. Feels like she has fluid in her abdomen. Does not feels stressed; other than wanting to know what is wrong/ causing her to feel this way. She would like full workup. Denies drug/ non-prescription drug use/ OTC/ energy drinks.     Accompanying Signs & Symptoms: bloating, sob, irregular heart beats    Taking Medication as prescribed: yes    Side Effects:  None    Medication Helping Symptoms:  not applicable    Review of Systems   Constitutional, HEENT, cardiovascular, pulmonary, GI, , musculoskeletal, neuro, skin, endocrine and psych systems are negative, except as otherwise noted.      Objective    /82   Pulse 85   Temp 99.3  F (37.4  C) (Tympanic)   Resp 20   Wt 88.3 kg (194 lb 9.6 oz)   SpO2 98%   BMI 32.42 kg/m    Body mass index is 32.42 kg/m .  Physical Exam   GENERAL: healthy, alert and no distress  RESP: lungs clear to auscultation - no rales, rhonchi or wheezes  CV: regular rate and rhythm, normal S1 S2, no S3 or S4, no murmur, click or rub, no peripheral edema and peripheral pulses strong  ABDOMEN: no hepatosplenomegaly, no masses and bowel sounds normal POSITIVE for RLQ pain and epigastric pain with palpation, no rebound " tenderness; generalized abdominal tenderness  MS: no gross musculoskeletal defects noted, no edema  PSYCH: mentation appears normal, affect normal/bright    See orders  EKG appears normal sinus rhythm, no previous EKGs        Answers for HPI/ROS submitted by the patient on 9/28/2021  If you checked off any problems, how difficult have these problems made it for you to do your work, take care of things at home, or get along with other people?: Not difficult at all  PHQ9 TOTAL SCORE: 1  ENOC 7 TOTAL SCORE: 5

## 2021-09-28 ENCOUNTER — OFFICE VISIT (OUTPATIENT)
Dept: FAMILY MEDICINE | Facility: CLINIC | Age: 26
End: 2021-09-28
Payer: COMMERCIAL

## 2021-09-28 VITALS
DIASTOLIC BLOOD PRESSURE: 82 MMHG | SYSTOLIC BLOOD PRESSURE: 125 MMHG | WEIGHT: 194.6 LBS | OXYGEN SATURATION: 98 % | BODY MASS INDEX: 32.42 KG/M2 | TEMPERATURE: 99.3 F | RESPIRATION RATE: 20 BRPM | HEART RATE: 85 BPM

## 2021-09-28 DIAGNOSIS — K59.01 SLOW TRANSIT CONSTIPATION: ICD-10-CM

## 2021-09-28 DIAGNOSIS — R00.2 PALPITATIONS: Primary | ICD-10-CM

## 2021-09-28 DIAGNOSIS — R07.89 ATYPICAL CHEST PAIN: ICD-10-CM

## 2021-09-28 DIAGNOSIS — R10.84 ABDOMINAL PAIN, GENERALIZED: ICD-10-CM

## 2021-09-28 LAB
ALBUMIN SERPL-MCNC: 4 G/DL (ref 3.4–5)
ALP SERPL-CCNC: 90 U/L (ref 40–150)
ALT SERPL W P-5'-P-CCNC: 35 U/L (ref 0–50)
AMYLASE SERPL-CCNC: 45 U/L (ref 30–110)
ANION GAP SERPL CALCULATED.3IONS-SCNC: 6 MMOL/L (ref 3–14)
AST SERPL W P-5'-P-CCNC: 13 U/L (ref 0–45)
BASOPHILS # BLD AUTO: 0 10E3/UL (ref 0–0.2)
BASOPHILS NFR BLD AUTO: 0 %
BILIRUB SERPL-MCNC: 0.6 MG/DL (ref 0.2–1.3)
BUN SERPL-MCNC: 6 MG/DL (ref 7–30)
CALCIUM SERPL-MCNC: 9.9 MG/DL (ref 8.5–10.1)
CHLORIDE BLD-SCNC: 108 MMOL/L (ref 94–109)
CO2 SERPL-SCNC: 26 MMOL/L (ref 20–32)
CREAT SERPL-MCNC: 0.71 MG/DL (ref 0.52–1.04)
CRP SERPL-MCNC: 5.3 MG/L (ref 0–8)
EOSINOPHIL # BLD AUTO: 0.1 10E3/UL (ref 0–0.7)
EOSINOPHIL NFR BLD AUTO: 2 %
ERYTHROCYTE [DISTWIDTH] IN BLOOD BY AUTOMATED COUNT: 14.5 % (ref 10–15)
ERYTHROCYTE [SEDIMENTATION RATE] IN BLOOD BY WESTERGREN METHOD: 9 MM/HR (ref 0–20)
GFR SERPL CREATININE-BSD FRML MDRD: >90 ML/MIN/1.73M2
GLUCOSE BLD-MCNC: 90 MG/DL (ref 70–99)
HCT VFR BLD AUTO: 40.9 % (ref 35–47)
HGB BLD-MCNC: 13.6 G/DL (ref 11.7–15.7)
LIPASE SERPL-CCNC: 78 U/L (ref 73–393)
LYMPHOCYTES # BLD AUTO: 1.9 10E3/UL (ref 0.8–5.3)
LYMPHOCYTES NFR BLD AUTO: 26 %
MCH RBC QN AUTO: 27.9 PG (ref 26.5–33)
MCHC RBC AUTO-ENTMCNC: 33.3 G/DL (ref 31.5–36.5)
MCV RBC AUTO: 84 FL (ref 78–100)
MONOCYTES # BLD AUTO: 0.5 10E3/UL (ref 0–1.3)
MONOCYTES NFR BLD AUTO: 7 %
NEUTROPHILS # BLD AUTO: 4.9 10E3/UL (ref 1.6–8.3)
NEUTROPHILS NFR BLD AUTO: 65 %
PLATELET # BLD AUTO: 325 10E3/UL (ref 150–450)
POTASSIUM BLD-SCNC: 4 MMOL/L (ref 3.4–5.3)
PROT SERPL-MCNC: 8 G/DL (ref 6.8–8.8)
RBC # BLD AUTO: 4.87 10E6/UL (ref 3.8–5.2)
SODIUM SERPL-SCNC: 140 MMOL/L (ref 133–144)
TSH SERPL DL<=0.005 MIU/L-ACNC: 2.24 MU/L (ref 0.4–4)
WBC # BLD AUTO: 7.5 10E3/UL (ref 4–11)

## 2021-09-28 PROCEDURE — 93000 ELECTROCARDIOGRAM COMPLETE: CPT | Performed by: NURSE PRACTITIONER

## 2021-09-28 PROCEDURE — 85652 RBC SED RATE AUTOMATED: CPT | Performed by: NURSE PRACTITIONER

## 2021-09-28 PROCEDURE — 99214 OFFICE O/P EST MOD 30 MIN: CPT | Performed by: NURSE PRACTITIONER

## 2021-09-28 PROCEDURE — 82150 ASSAY OF AMYLASE: CPT | Performed by: NURSE PRACTITIONER

## 2021-09-28 PROCEDURE — 83690 ASSAY OF LIPASE: CPT | Performed by: NURSE PRACTITIONER

## 2021-09-28 PROCEDURE — 86140 C-REACTIVE PROTEIN: CPT | Performed by: NURSE PRACTITIONER

## 2021-09-28 PROCEDURE — 86038 ANTINUCLEAR ANTIBODIES: CPT | Performed by: NURSE PRACTITIONER

## 2021-09-28 PROCEDURE — 80050 GENERAL HEALTH PANEL: CPT | Performed by: NURSE PRACTITIONER

## 2021-09-28 PROCEDURE — 36415 COLL VENOUS BLD VENIPUNCTURE: CPT | Performed by: NURSE PRACTITIONER

## 2021-09-28 RX ORDER — POLYETHYLENE GLYCOL 3350 17 G/17G
1 POWDER, FOR SOLUTION ORAL DAILY
Qty: 578 G | Refills: 1 | Status: SHIPPED | OUTPATIENT
Start: 2021-09-28 | End: 2022-06-23

## 2021-09-28 ASSESSMENT — PATIENT HEALTH QUESTIONNAIRE - PHQ9
SUM OF ALL RESPONSES TO PHQ QUESTIONS 1-9: 1
10. IF YOU CHECKED OFF ANY PROBLEMS, HOW DIFFICULT HAVE THESE PROBLEMS MADE IT FOR YOU TO DO YOUR WORK, TAKE CARE OF THINGS AT HOME, OR GET ALONG WITH OTHER PEOPLE: NOT DIFFICULT AT ALL
SUM OF ALL RESPONSES TO PHQ QUESTIONS 1-9: 1

## 2021-09-28 ASSESSMENT — ANXIETY QUESTIONNAIRES
5. BEING SO RESTLESS THAT IT IS HARD TO SIT STILL: NOT AT ALL
1. FEELING NERVOUS, ANXIOUS, OR ON EDGE: SEVERAL DAYS
2. NOT BEING ABLE TO STOP OR CONTROL WORRYING: SEVERAL DAYS
7. FEELING AFRAID AS IF SOMETHING AWFUL MIGHT HAPPEN: SEVERAL DAYS
GAD7 TOTAL SCORE: 5
8. IF YOU CHECKED OFF ANY PROBLEMS, HOW DIFFICULT HAVE THESE MADE IT FOR YOU TO DO YOUR WORK, TAKE CARE OF THINGS AT HOME, OR GET ALONG WITH OTHER PEOPLE?: NOT DIFFICULT AT ALL
3. WORRYING TOO MUCH ABOUT DIFFERENT THINGS: SEVERAL DAYS
4. TROUBLE RELAXING: SEVERAL DAYS
7. FEELING AFRAID AS IF SOMETHING AWFUL MIGHT HAPPEN: SEVERAL DAYS
GAD7 TOTAL SCORE: 5
GAD7 TOTAL SCORE: 5
6. BECOMING EASILY ANNOYED OR IRRITABLE: NOT AT ALL

## 2021-09-28 NOTE — PATIENT INSTRUCTIONS
Patient Education     Unknown Causes of Abdominal Pain (Female)    The exact cause of your belly (abdominal) pain is not clear. This does not mean that this is something to worry about. Everyone likes to know the exact cause of the problem. But sometimes with belly pain, there is no clear-cut cause, and this could be a good thing. The good news is that your symptoms can be treated, and you will feel better.   Your condition does not seem serious now. But sometimes the signs of a serious problem may take more time to appear. For this reason, it is important for you to watch for any new symptoms, problems, or worsening of your condition.  Over the next few days, the abdominal pain may come and go. Or it may be constant. Other common symptoms can include nausea and vomiting. Sometimes it can be difficult to tell if you feel nauseous. You may just feel bad and not connect that feeling to nausea. Constipation, diarrhea, and a fever may go along with the pain.  The pain may continue even if treated correctly over the following days. Depending on how things go, sometimes the cause can become clear and may need more or different treatment. Additional evaluations, medicines, or tests may also be needed.  Home care  Your healthcare provider may prescribe medicine for pain, symptoms, or an infection.  Follow the healthcare provider's instructions for taking these medicines.  General care    Rest as much as you can until your next exam. No strenuous activities.    Try to find positions that ease discomfort. A small pillow placed on the abdomen may help relieve pain.    Something warm on your abdomen (such as a heating pad) may help, but be careful not to burn yourself.  Diet    Don t force yourself to eat, especially if having cramps, vomiting, or diarrhea.    Water is important so you don't get dehydrated. Soup may also be good. Sports drinks may also help, especially if they are not too acidic. Don't drink sugary drinks as  this can make things worse. Take liquids in small amounts. Don t guzzle them.    Caffeine sometimes makes the pain and cramping worse.    Don t take dairy products if you have vomiting or diarrhea.    Don't eat large amounts at a time. Wait a few minutes between bites.    Eat a diet low in fiber (called a low-residue diet). Foods allowed include refined breads, white rice, fruit and vegetable juices without pulp, tender meats. These foods will pass more easily through the intestine.    Don t have whole-grain foods, whole fruits and vegetables, meats, seeds and nuts, fried or fatty foods, dairy, alcohol and spicy foods until your symptoms go away.  Follow-up care  Follow up with your healthcare provider, or as advised, if your pain does not begin to improve in the next 24 hours.  Call 911  Call 911 if any of these occur:    Trouble breathing    Confusion    Fainting or loss of consciousness    Rapid heart rate    Seizure  When to seek medical advice  Call your healthcare provider right away if any of these occur:    Pain gets worse or moves to the right lower abdomen    New or worsening vomiting or diarrhea    Swelling of the abdomen    Unable to pass stool for more than 3 days    Fever of 100.4 F (38 C) or higher, or as directed by your healthcare provider.    Blood in vomit or bowel movements (dark red or black color)    Yellow color of eyes and skin (jaundice)    Weakness, dizziness    Chest, arm, back, neck, or jaw pain    Unexpected vaginal bleeding or missed period    Can't keep down liquids or water and you are getting dehydrated  [employer] last reviewed this educational content on 6/1/2018 2000-2021 [employer]. All rights reserved. This information is not intended as a substitute for professional medical care. Always follow your healthcare professional's instructions.           Patient Education     Uncertain Causes of Chest Pain    Chest pain can happen for a number of reasons. Sometimes the  cause can't be determined. If your condition does not seem serious, and your pain does not appear to be coming from your heart, your healthcare provider may recommend watching it closely. Sometimes the signs of a serious problem take more time to appear. Many problems not related to your heart can cause chest pain. These include:    Musculoskeletal. Costochondritis is an inflammation of the tissues around the ribs that can occur from trauma or overuse injuries, or a strain of the muscles of the chest wall    Respiratory. Pneumonia, collapsed lung (pneumothorax), or inflammation of the lining of the chest and lungs (pleurisy)    Gastrointestinal. Esophageal reflux, heartburn, ulcers, or gallbladder disease    Anxiety and panic disorders    Nerve compression and inflammation    Rare miscellaneous problems such as aortic aneurysm (a swelling of the large artery coming out of the heart) or pulmonary embolism (a blood clot in the lungs)  Home care  After your visit, follow these recommendations:    Rest today and avoid strenuous activity.    Take any prescribed medicine as directed.    Be aware of any recurrent chest pain and notice any changes  Follow-up care  Follow up with your healthcare provider if you do not start to feel better within 24 hours, or as advised.  Call 911  Call 911 if any of these occur:    A change in the type of pain: if it feels different, becomes more severe, lasts longer, or begins to spread into your shoulder, arm, neck, jaw or back    Shortness of breath or increased pain with breathing    Weakness, dizziness, or fainting    Rapid heart beat    Crushing sensation in your chest  When to seek medical advice  Call your healthcare provider right away if any of the following occur:    Cough with dark colored sputum (phlegm) or blood    Fever of 100.4 F (38 C) or higher, or as directed by your healthcare provider    Swelling, pain or redness in one leg  [employer] last reviewed this educational  content on 5/1/2018 2000-2021 [employer]. All rights reserved. This information is not intended as a substitute for professional medical care. Always follow your healthcare professional's instructions.           Patient Education     Coping with Constipation  What is constipation?  If you have hard, dry stools that are difficult to pass, you have constipation. You may also have bloating, gas and stomach cramps.  How is it treated?  If the problem is severe, your care team can suggest medicine to relieve it (a laxative, stool softener or enema). Do not use medicine unless your care team tells you to.  You should not use an enema (rectal wash) if your white blood cell or platelet count is low.  What else can I do to treat or prevent constipation?    Eat at the same times each day.    Add fiber to your diet by eating:  ? Whole grain breads, cereals and pastas  ? Whole grains such as barley or brown rice  ? Raw vegetables  ? Fresh and dried fruits  ? Dried beans and peas  ? Nuts, seeds and popcorn.    Drink lots of fluids: at least eight to ten 8-ounce glasses each day. Try water, prune juice, warm juices, herbal teas and lemonade.    Have a hot drink with high-fiber foods for breakfast.    Eat the skins on fruits and potatoes. Wash them well before eating.    Add wheat bran to cereals, casseroles and homemade breads.    If gas is a problem:  ? Avoid gas-forming foods such carbonated (fizzy) drinks, broccoli, cabbage, cauliflower, dried beans and peas, peppers and onions.  ? Do not use a straw.  ? Do not chew gum.    Stay active. Simply getting out for a walk can help. Increase your exercise as you are able.    Try to use the toilet at the same times each day. Keep a record of your bowel movements.    If you take medicine that may cause constipation, your doctor may ask you to take a stool softener.  When should I call my care team?  Call your care team if:    You do not have a bowel movement for two  or more days.    You have sudden, severe belly pain.    You notice blood in your stool.    You have severe hemorrhoids (swelling, itching and pain around the anus).  Comments:  __________________________________________  __________________________________________  __________________________________________  __________________________________________  __________________________________________  __________________________________________  __________________________________________  __________________________________________  __________________________________________  __________________________________________  For informational purposes only. Not to replace the advice of your health care provider. Copyright   2006 [facility]. All rights reserved. Clinically reviewed by [facility]. SMART[employer] 372788 - REV 04/19.           Patient Education     Excess Gas   Certain foods produce gas when digested. In some people, these foods make an excessive amount of gas. This may cause bloating, burping, or increased gas passing through the rectum (flatulence).  Foods that cause gas  These foods are more likely to cause this problem. Limit them, or remove them from your diet:    Broccoli    Cauliflower    [place] sprouts    Cabbage    Cooked dried beans    Fizzy (carbonated) drinks, such as sparkling water, soda, beer, and champagne  Other causes  Other causes of excess gas include:    Eating too fast ortalkingwhile you chew. This may cause you to swallow air. This increases the amount of gas in your stomach. And it may make your symptoms worse. Chew each mouthful completely before you swallow. Take your time.    Chewing on gum or sucking on hard candy. These cause you to swallow more often. And some of what you are swallowing is air. This leads to more gas in your stomach. Avoid chewing gum and hard candy.    Overeating. This may increase the feeling of being bloated and cause more gas. When you are full, stop  eating.     Being constipated. This can increase the amount of normal intestinal gas. Prevent constipation by getting more fiber in your diet. Good sources of fiber include whole-grain cereal, fresh vegetables (except those in the above list), and fresh fruits. High-fiber foods absorb water and carry it out of the body. When adding more fiber to your diet, you also need to drink more water. Drink at least 8, 8-ounce glasses of water (2 quarts) per day.  [employer] last reviewed this educational content on 10/1/2019    2525-5822 [employer]. All rights reserved. This information is not intended as a substitute for professional medical care. Always follow your healthcare professional's instructions.

## 2021-09-29 ENCOUNTER — ANCILLARY PROCEDURE (OUTPATIENT)
Dept: CARDIOLOGY | Facility: CLINIC | Age: 26
End: 2021-09-29
Attending: NURSE PRACTITIONER
Payer: COMMERCIAL

## 2021-09-29 DIAGNOSIS — R07.89 ATYPICAL CHEST PAIN: ICD-10-CM

## 2021-09-29 DIAGNOSIS — R10.84 ABDOMINAL PAIN, GENERALIZED: ICD-10-CM

## 2021-09-29 DIAGNOSIS — R00.2 PALPITATIONS: ICD-10-CM

## 2021-09-29 LAB — ANA SER QL IF: NEGATIVE

## 2021-09-29 PROCEDURE — 93248 EXT ECG>7D<15D REV&INTERPJ: CPT | Performed by: INTERNAL MEDICINE

## 2021-09-29 PROCEDURE — 93246 EXT ECG>7D<15D RECORDING: CPT | Performed by: INTERNAL MEDICINE

## 2021-09-29 ASSESSMENT — PATIENT HEALTH QUESTIONNAIRE - PHQ9: SUM OF ALL RESPONSES TO PHQ QUESTIONS 1-9: 1

## 2021-09-29 ASSESSMENT — ANXIETY QUESTIONNAIRES: GAD7 TOTAL SCORE: 5

## 2021-09-29 NOTE — RESULT ENCOUNTER NOTE
Scotty Jo,    Thank you for your recent office visit.    Here are your recent results.  All labs look great.    Feel free to contact me via [employer] [facility] or call the clinic at [phone].    Sincerely,    SAHARA Paez, FNP-BC

## 2021-09-29 NOTE — PROGRESS NOTES
Zio patch placed on patient in clinic today. Patient was instructed to wear patch for 7 days or at least 7 days. Patient also instructed to not get patch wet, submerge under water or excessively sweat. If a shower is taken, keep water to the back side of body. If the area DOES become wet pat it dry with a cloth.     Patient also instructed to contact [employer] with any questions.    Patient agreed with plan and was sent home with the box, information pamphlet and booklet to anita any incidents in.

## 2021-10-10 ENCOUNTER — HEALTH MAINTENANCE LETTER (OUTPATIENT)
Age: 26
End: 2021-10-10

## 2021-10-12 ENCOUNTER — OFFICE VISIT (OUTPATIENT)
Dept: URGENT CARE | Facility: URGENT CARE | Age: 26
End: 2021-10-12
Payer: COMMERCIAL

## 2021-10-12 VITALS
TEMPERATURE: 98.8 F | SYSTOLIC BLOOD PRESSURE: 136 MMHG | OXYGEN SATURATION: 100 % | BODY MASS INDEX: 32.22 KG/M2 | WEIGHT: 193.4 LBS | HEART RATE: 65 BPM | DIASTOLIC BLOOD PRESSURE: 89 MMHG

## 2021-10-12 DIAGNOSIS — R29.810 FACIAL WEAKNESS: ICD-10-CM

## 2021-10-12 DIAGNOSIS — M54.12 RADICULITIS OF LEFT CERVICAL REGION: Primary | ICD-10-CM

## 2021-10-12 DIAGNOSIS — R51.9 ACUTE NONINTRACTABLE HEADACHE, UNSPECIFIED HEADACHE TYPE: ICD-10-CM

## 2021-10-12 PROCEDURE — 99215 OFFICE O/P EST HI 40 MIN: CPT | Performed by: PHYSICIAN ASSISTANT

## 2021-10-12 NOTE — PATIENT INSTRUCTIONS
No advanced imaging here.  To ER for further evaluation and treatment differential includes cervical radiculopathy, cervical myelopathy, brain bleed, brain mass, vascular etiology, pulmonary etiology, shoulder impingement.  No facial drooping noted on exam.  Negative pronator drift.  No tongue deviation.  No slurred speech.

## 2021-10-12 NOTE — PROGRESS NOTES
Chief Complaint   Patient presents with     Back Pain     POSSIBLE DISLOCATED DISC IN NECK AND BACK 10/10/21          Medical Decision Making:    Differential Diagnosis:  Back Pain: degenerative disc disease, possible herniated disc , spinal cord infection and spinal cord tumor, Musculoskeletal strain/sprain        Impression:     ICD-10-CM    1. Radiculitis of left cervical region  M54.12    2. Acute nonintractable headache, unspecified headache type  R51.9    3. Facial weakness  R29.810          Plan: Severe left-sided neck pain with paresthesias entire left arm to the fingers.  Also with severe headache that she states covers  her entire head.  Feels like the muscles in the left side of her face are not working well although exam shows that she is neurologically intact here today.  We are limited on labs and imaging.  To ER for further evaluation and treatment.  Differential includes brain bleed, brain mass, cervical myelopathy, cervical radiculopathy, sinusitis, cardiac/vascular cause, pulmonary cause, shoulder impingement, bacterial or viral illness.        Johanna Prakash PA-C      SUBJECTIVE:  26-year-old female presents for severe left-sided neck pain, headache over the entire head and numbness and tingling diffusely into the left arm all the way down to her fingers.  Also radiates to the left anterior chest wall and left thoracic back.  No injury.  Onset approximately 48 hours ago.  Has never had anything like this before in the past.  No bowel or bladder trouble.  She can tell when she has to go to the bathroom and she is able to get there on time.  No numbness or tingling of the perineum.  No fever.  No cough, sore throat, nasal congestion.  No facial paresthesias although she states that the left side of her face feels as if it is not working properly-like the muscles are not working.      Allergies   Allergen Reactions     Amoxicillin      Penicillins Hives     Azithromycin Rash       No past  medical history on file.    cromolyn (OPTICROM) 4 % ophthalmic solution, INT 2 GTS IN OU QID  CRYSELLE-28 0.3-30 MG-MCG tablet, Take 1 tablet by mouth daily  fluticasone (FLONASE) 50 MCG/ACT nasal spray, SHAKE LQ AND U 2 SPRAYS IEN QD  hydrOXYzine (ATARAX) 10 MG tablet, TK 1 TO 3 TS PO QHS  metoprolol succinate ER (TOPROL-XL) 25 MG 24 hr tablet, TAKE ONE-HALF TABLET BY MOUTH ONCE DAILY  omeprazole (PRILOSEC) 20 MG DR capsule, TAKE 1 CAPSULE BY MOUTH TWICE DAILY 30 MINUTES TO 1 HOUR BEFORE A MEAL  polyethylene glycol (MIRALAX) 17 GM/Dose powder, Take 17 g (1 capful) by mouth daily (Patient not taking: Reported on 10/12/2021)  trimethoprim-polymyxin b (POLYTRIM) 27196-5.1 UNIT/ML-% ophthalmic solution, Apply 1 drop in left eye three times per day. Use for 2 days after the redness is gone. Typically 5-7 days total. (Patient not taking: Reported on 9/28/2021)    No current facility-administered medications on file prior to visit.      Past Surgical History:   Procedure Laterality Date     appenctomy Right 12/17/2020     ESOPHAGOSCOPY, GASTROSCOPY, DUODENOSCOPY (EGD), COMBINED N/A 07/15/2020    Procedure: ESOPHAGOGASTRODUODENOSCOPY, WITH BIOPSY;  Surgeon: Leventhal, Thomas Michael, MD;  Location:  OR       Social History     Socioeconomic History     Marital status: Single     Spouse name: Not on file     Number of children: Not on file     Years of education: Not on file     Highest education level: Not on file   Occupational History     Not on file   Tobacco Use     Smoking status: Never Smoker     Smokeless tobacco: Never Used   Vaping Use     Vaping Use: Never used   Substance and Sexual Activity     Alcohol use: Yes     Comment: social     Drug use: Never     Sexual activity: Yes     Partners: Male     Birth control/protection: Pill   Other Topics Concern     Not on file   Social History Narrative     Not on file     Social Determinants of Health     Financial Resource Strain:      Difficulty of Paying Living  Expenses:    Food Insecurity:      Worried About Running Out of Food in the Last Year:      Ran Out of Food in the Last Year:    Transportation Needs:      Lack of Transportation (Medical):      Lack of Transportation (Non-Medical):    Physical Activity:      Days of Exercise per Week:      Minutes of Exercise per Session:    Stress:      Feeling of Stress :    Social Connections:      Frequency of Communication with Friends and Family:      Frequency of Social Gatherings with Friends and Family:      Attends Mandaeism Services:      Active Member of Clubs or Organizations:      Attends Club or Organization Meetings:      Marital Status:    Intimate Partner Violence:      Fear of Current or Ex-Partner:      Emotionally Abused:      Physically Abused:      Sexually Abused:        REVIEW OF SYSTEMS  General: negative for fever  Resp: negative for shortness of breath   CV: negative for palpitations   : negative for dysuria , incontinence, frequency  Musculoskeletal: as above  Neurologic: negative for ataxia, saddle anesthesia, fecal incontinence, one sided weakness,  paresthesias    Physical Exam:  Vitals: /89 (BP Location: Left arm, Patient Position: Sitting, Cuff Size: Adult Regular)   Pulse 65   Temp 98.8  F (37.1  C) (Oral)   Wt 87.7 kg (193 lb 6.4 oz)   SpO2 100%   BMI 32.22 kg/m    BMI= Body mass index is 32.22 kg/m .  Constitutional: healthy, alert and no acute distress   Cranial nerves II through XII grossly intact.  Negative pronator drift.  No tongue deviation.  Smile symmetric.  No slurred speech.  CARDIO: RRR, no MRG  RESP: lungs CTA, NAD  NEURO: Reflexes upper and lower extremities intact and equal bilaterally.  Motor grade 5 throughout upper and lower extremities.    Left shoulder joint nontender to palpation, negative impingement sign.  Back-full range of motion.  No lumbar spinous process tenderness.  Some tenderness into the left thoracic paraspinous musculature.  Neck tender left cervical  paraspinous musculature and trapezius muscle.  Decreased range of motion all planes with increased pain.  Good palpable radial pulse bilateral upper extremities.  GAIT: intact  Psychiatric: mentation appears normal and affect normal/bright  Back-no CVA tenderness.      Johanna Prakash PA-C

## 2021-10-15 ENCOUNTER — ANCILLARY PROCEDURE (OUTPATIENT)
Dept: CARDIOLOGY | Facility: CLINIC | Age: 26
End: 2021-10-15
Attending: NURSE PRACTITIONER
Payer: COMMERCIAL

## 2021-10-15 DIAGNOSIS — R00.2 PALPITATIONS: ICD-10-CM

## 2021-10-15 DIAGNOSIS — R07.89 ATYPICAL CHEST PAIN: Primary | ICD-10-CM

## 2021-10-15 DIAGNOSIS — R10.84 ABDOMINAL PAIN, GENERALIZED: ICD-10-CM

## 2021-10-15 PROCEDURE — 93352 ADMIN ECG CONTRAST AGENT: CPT | Performed by: INTERNAL MEDICINE

## 2021-10-15 PROCEDURE — 93325 DOPPLER ECHO COLOR FLOW MAPG: CPT | Performed by: INTERNAL MEDICINE

## 2021-10-15 PROCEDURE — C8928 TTE W OR W/O FOL W/CON,STRES: HCPCS | Performed by: INTERNAL MEDICINE

## 2021-10-15 PROCEDURE — 93321 DOPPLER ECHO F-UP/LMTD STD: CPT | Performed by: INTERNAL MEDICINE

## 2021-10-15 PROCEDURE — 93018 CV STRESS TEST I&R ONLY: CPT | Performed by: INTERNAL MEDICINE

## 2021-10-15 PROCEDURE — 93017 CV STRESS TEST TRACING ONLY: CPT | Performed by: INTERNAL MEDICINE

## 2021-10-15 PROCEDURE — 93016 CV STRESS TEST SUPVJ ONLY: CPT | Performed by: INTERNAL MEDICINE

## 2021-10-15 RX ADMIN — Medication 5 ML: at 14:30

## 2021-10-19 NOTE — RESULT ENCOUNTER NOTE
Scotty Jo,    Thank you for your recent office visit.    Here are your recent results.  Per radiology- normal cardiac stress test.     Feel free to contact me via [email or web address] or call the clinic at [phone].    Sincerely,    SAHARA Paez, FNP-BC

## 2021-10-25 ENCOUNTER — ANCILLARY PROCEDURE (OUTPATIENT)
Dept: ULTRASOUND IMAGING | Facility: CLINIC | Age: 26
End: 2021-10-25
Attending: OBSTETRICS & GYNECOLOGY
Payer: COMMERCIAL

## 2021-10-25 DIAGNOSIS — N94.89 ENDOMETRIAL MASS: ICD-10-CM

## 2021-10-25 PROCEDURE — 76856 US EXAM PELVIC COMPLETE: CPT | Performed by: RADIOLOGY

## 2021-10-25 PROCEDURE — 76830 TRANSVAGINAL US NON-OB: CPT | Performed by: RADIOLOGY

## 2021-10-26 ENCOUNTER — PREP FOR PROCEDURE (OUTPATIENT)
Dept: OBGYN | Facility: CLINIC | Age: 26
End: 2021-10-26

## 2021-10-26 DIAGNOSIS — N94.89 ENDOMETRIAL MASS: Primary | ICD-10-CM

## 2021-10-26 NOTE — RESULT ENCOUNTER NOTE
Scotty Jo,    Thank you for your recent office visit.    Here are your recent results.  Per cardiology- normal cardiac monitor results.     Feel free to contact me via [employer] or call the clinic at [phone].    Sincerely,    SAHARA Paez, FNP-BC

## 2021-10-27 PROBLEM — N94.89 ENDOMETRIAL MASS: Status: ACTIVE | Noted: 2021-10-27

## 2021-10-28 ENCOUNTER — TELEPHONE (OUTPATIENT)
Dept: OBGYN | Facility: CLINIC | Age: 26
End: 2021-10-28

## 2021-10-28 NOTE — TELEPHONE ENCOUNTER
Associated Diagnoses    Endometrial mass [N94.89]  - Primary           Source Order Set    Order Set Name Order ID    [id number]     Case Request: Case Info      Panel 1      Providers    Provider Role Service   Agbeh, Cephas Mawuena, MD Primary Gynecology     Procedures    Procedure Laterality Anesthesia Region   Hysteroscopy, Dilatation and Curettage ,Polypectomy with MYOSURE. N/A Combined MAC with Local       Requested date:    Location:  OR   Patient class:       Pre-op diagnoses:  Endometrial mass     Scheduling Instructions    Additional Instructions for the Case   Surgical Assistant: No   Multi Surgeon Case No   H&P:  Pre-op options: PCP   Post-op:  NA   Vendor: No   Surgical time needed: 60 Minutes   ERAS patient: NA     SURGERY SCHEDULING AND PRECERTIFICATION    Medical Record Number: [id number]  Deysi Stoner  YOB: 1995   Phone: [phone] (home)   Primary Provider: Wendy Vasquez    Reason for Admit:  ICD-10 CODE:  Endometrial mass [N94.89]  - Primary     Surgeon: Cephas Agbeh, MD  Surgical Procedure:   Procedure   Hysteroscopy, Dilatation and Curettage,Polypectomy with MYOSURE.     Date of Surgery  Time of Surgery 8:00 a.m.  Surgery to be performed at:  [facility]   Status: Outpatient  Type of Anesthesia Anticipated: Local with MAC    Sterilization consent:  Not applicable to procedure being performed.    Pre-Op: On  with Selin Martin at 8:40 a.m.  COVID testin/12 4:30 p.m. Wen Castillo   Post-Op:  Not listed    Pre-certification routed to Financial Counselors:  Auto routes via Case Request    Surgery packet mailed to patient's home address: Yes  Patient instructed NPO 12 hours prior to surgery, arrive 1 hour(s) prior to surgery, must have a .  Patient understood and agrees to the plan.      Requestor:  Stephany Wells     Location:  [facility] [phone]

## 2021-10-29 ENCOUNTER — TRANSFERRED RECORDS (OUTPATIENT)
Dept: HEALTH INFORMATION MANAGEMENT | Facility: CLINIC | Age: 26
End: 2021-10-29
Payer: COMMERCIAL

## 2021-11-11 ENCOUNTER — OFFICE VISIT (OUTPATIENT)
Dept: FAMILY MEDICINE | Facility: CLINIC | Age: 26
End: 2021-11-11
Payer: COMMERCIAL

## 2021-11-11 VITALS
BODY MASS INDEX: 32.79 KG/M2 | TEMPERATURE: 98.9 F | HEART RATE: 87 BPM | DIASTOLIC BLOOD PRESSURE: 83 MMHG | OXYGEN SATURATION: 99 % | SYSTOLIC BLOOD PRESSURE: 130 MMHG | WEIGHT: 196.8 LBS | RESPIRATION RATE: 20 BRPM

## 2021-11-11 DIAGNOSIS — K22.70 BARRETT'S ESOPHAGUS WITH ESOPHAGITIS: ICD-10-CM

## 2021-11-11 DIAGNOSIS — K20.90 BARRETT'S ESOPHAGUS WITH ESOPHAGITIS: ICD-10-CM

## 2021-11-11 DIAGNOSIS — N94.89 ENDOMETRIAL MASS: ICD-10-CM

## 2021-11-11 DIAGNOSIS — I10 BENIGN ESSENTIAL HYPERTENSION: ICD-10-CM

## 2021-11-11 DIAGNOSIS — Z01.818 PRE-OP EXAM: Primary | ICD-10-CM

## 2021-11-11 PROCEDURE — 99214 OFFICE O/P EST MOD 30 MIN: CPT | Performed by: NURSE PRACTITIONER

## 2021-11-11 RX ORDER — SUCRALFATE ORAL 1 G/10ML
SUSPENSION ORAL
COMMUNITY
Start: 2021-11-10 | End: 2022-06-23

## 2021-11-11 ASSESSMENT — PAIN SCALES - GENERAL: PAINLEVEL: NO PAIN (0)

## 2021-11-11 NOTE — PROGRESS NOTES
[facility]  [id number] [address] [facility]  [place] [address]  Phone: [phone]  Primary Provider: Wendy Vasquez  Pre-op Performing Provider: MARY SALDANA      PREOPERATIVE EVALUATION:  Today's date: 11/11/2021    Deysi Stoner is a 26 year old female who presents for a preoperative evaluation.    Surgical Information:  Surgery/Procedure: Endometrial Mass Removal  Surgery Location: [facility]  Surgeon: Agbeh  Surgery Date: 11/16/21  Time of Surgery: 7am  Where patient plans to recover: At home with family  Fax number for surgical facility: Note does not need to be faxed, will be available electronically in Epic.    Type of Anesthesia Anticipated: to be determined    Assessment & Plan     The proposed surgical procedure is considered LOW risk.    Pre-Op    Benign essential hypertension      Endometrial mass    Risks and Recommendations:  The patient has the following additional risks and recommendations for perioperative complications:   - No identified additional risk factors other than previously addressed    Medication Instructions:  Patient is to take all scheduled medications on the day of surgery EXCEPT for modifications listed below: advised to refrain from blood thinners.     RECOMMENDATION:  APPROVAL GIVEN to proceed with proposed procedure, without further diagnostic evaluation.    20 minutes spent on the date of the encounter doing chart review, history and exam, documentation and further activities per the note    Subjective     HPI related to upcoming procedure: 2 uterine masses, changed from one mass on previous image    Preop Questions 11/11/2021   1. Have you ever had a heart attack or stroke? No   2. Have you ever had surgery on your heart or blood vessels, such as a stent placement, a coronary artery bypass, or surgery on an artery in your head, neck, heart, or legs? No   3. Do you have chest pain with activity? Yes- cardiac workup negative   4. Do  you have a history of heart failure? No   5. Do you currently have a cold, bronchitis or symptoms of other infection? No   6. Do you have a cough, shortness of breath, or wheezing? No   7. Do you or anyone in your family have previous history of blood clots? No   8. Do you or does anyone in your family have a serious bleeding problem such as prolonged bleeding following surgeries or cuts? No   9. Have you ever had problems with anemia or been told to take iron pills? No   10. Have you had any abnormal blood loss such as black, tarry or bloody stools, or abnormal vaginal bleeding? No   11. Have you ever had a blood transfusion? No   12. Are you willing to have a blood transfusion if it is medically needed before, during, or after your surgery? Yes   13. Have you or any of your relatives ever had problems with anesthesia? No   14. Do you have sleep apnea, excessive snoring or daytime drowsiness? No   15. Do you have any artifical heart valves or other implanted medical devices like a pacemaker, defibrillator, or continuous glucose monitor? No   16. Do you have artificial joints? No   17. Are you allergic to latex? No   18. Is there any chance that you may be pregnant? No       Health Care Directive:  Patient does not have a Health Care Directive or Living Will: Discussed advance care planning with patient; however, patient declined at this time.    Preoperative Review of :   reviewed - no record of controlled substances prescribed.      Status of Chronic Conditions:  HYPERTENSION - Patient has longstanding history of HTN , currently denies any symptoms referable to elevated blood pressure. Specifically denies chest pain, palpitations, dyspnea, orthopnea, PND or peripheral edema. Blood pressure readings have been in normal range. Current medication regimen is as listed below. Patient denies any side effects of medication.       Review of Systems  CONSTITUTIONAL: NEGATIVE for fever, chills, change in  weight  INTEGUMENTARY/SKIN: NEGATIVE for worrisome rashes, moles or lesions  EYES: NEGATIVE for vision changes or irritation  ENT/MOUTH: NEGATIVE for ear, mouth and throat problems  RESP: NEGATIVE for significant cough or SOB  CV: NEGATIVE for chest pain, palpitations or peripheral edema  GI: POSITIVE for RLQ abdominal pain and heartburn or reflux- hx barrets esophagus with recent EGD requiring 3 biopsies.   : NEGATIVE for frequency, dysuria, or hematuria  MUSCULOSKELETAL: NEGATIVE for significant arthralgias or myalgia  NEURO: NEGATIVE for weakness, dizziness or paresthesias  ENDOCRINE: NEGATIVE for temperature intolerance, skin/hair changes  HEME: NEGATIVE for bleeding problems  PSYCHIATRIC: NEGATIVE for changes in mood or affect    Patient Active Problem List    Diagnosis Date Noted     Veloz's esophagus with esophagitis 11/11/2021     Priority: Medium     Endometrial mass 10/27/2021     Priority: Medium     Added automatically from request for surgery [id number]       Benign essential hypertension 04/29/2021     Priority: Medium     Palpitations 05/06/2019     Priority: Medium     Anxiety 06/25/2018     Priority: Medium     Encounter for other contraceptive management 06/25/2018     Priority: Medium      History reviewed. No pertinent past medical history.  Past Surgical History:   Procedure Laterality Date     appenctomy Right 12/17/2020     ESOPHAGOSCOPY, GASTROSCOPY, DUODENOSCOPY (EGD), COMBINED N/A 07/15/2020    Procedure: ESOPHAGOGASTRODUODENOSCOPY, WITH BIOPSY;  Surgeon: Leventhal, Thomas Michael, MD;  Location: [facility] OR     Current Outpatient Medications   Medication Sig Dispense Refill     cromolyn (OPTICROM) 4 % ophthalmic solution INT 2 GTS IN OU QID 10 mL 10     CRYSELLE-28 0.3-30 MG-MCG tablet Take 1 tablet by mouth daily 90 tablet 3     fluticasone (FLONASE) 50 MCG/ACT nasal spray SHAKE LQ AND U 2 SPRAYS IEN QD 16 g 11     hydrOXYzine (ATARAX) 10 MG tablet TK 1 TO 3 TS PO  tablet 3      metoprolol succinate ER (TOPROL-XL) 25 MG 24 hr tablet TAKE ONE-HALF TABLET BY MOUTH ONCE DAILY 90 tablet 3     omeprazole (PRILOSEC) 20 MG DR capsule TAKE 1 CAPSULE BY MOUTH TWICE DAILY 30 MINUTES TO 1 HOUR BEFORE A MEAL 90 capsule 3     polyethylene glycol (MIRALAX) 17 GM/Dose powder Take 17 g (1 capful) by mouth daily 578 g 1     sucralfate (CARAFATE) 1 GM/10ML suspension          Allergies   Allergen Reactions     Amoxicillin      Penicillins Hives     Azithromycin Rash        Social History     Tobacco Use     Smoking status: Never Smoker     Smokeless tobacco: Never Used   Substance Use Topics     Alcohol use: Yes     Comment: social     History reviewed. No pertinent family history.  History   Drug Use Unknown         Objective     /83   Pulse 87   Temp 98.9  F (37.2  C) (Tympanic)   Resp 20   Wt 89.3 kg (196 lb 12.8 oz)   SpO2 99%   BMI 32.79 kg/m      Physical Exam    GENERAL APPEARANCE: healthy, alert and no distress     EYES: EOMI, PERRL     HENT: ear canals and TM's normal and nose and mouth without ulcers or lesions     NECK: no adenopathy, no asymmetry, masses, or scars and thyroid normal to palpation     RESP: lungs clear to auscultation - no rales, rhonchi or wheezes     CV: regular rates and rhythm, normal S1 S2, no S3 or S4 and no murmur, click or rub     ABDOMEN:  soft, nontender, no HSM or masses and bowel sounds normal     MS: extremities normal- no gross deformities noted, no evidence of inflammation in joints, FROM in all extremities.     SKIN: no suspicious lesions or rashes     NEURO: Normal strength and tone, sensory exam grossly normal, mentation intact and speech normal     PSYCH: mentation appears normal. and affect normal/bright     LYMPHATICS: No cervical adenopathy    Recent Labs   Lab Test 09/28/21  0930 04/29/21  0754 03/04/20  0850   HGB 13.6  --  15.2     --  265    135 139   POTASSIUM 4.0 4.3 4.1   CR 0.71 0.78 0.71        Diagnostics:  No labs were  ordered during this visit.   No EKG this visit, completed in the last 90 days.    Revised Cardiac Risk Index (RCRI):  The patient has the following serious cardiovascular risks for perioperative complications:   - No serious cardiac risks = 0 points     RCRI Interpretation: 0 points: Class I (very low risk - 0.4% complication rate)       Signed Electronically by: ABDOULAYE Diop  Copy of this evaluation report is provided to requesting physician.

## 2021-11-11 NOTE — H&P (VIEW-ONLY)
[facility]  [address]  [place] [address]  Phone: [phone]  Primary Provider: Wendy Vasquez  Pre-op Performing Provider: MARY SALDANA      PREOPERATIVE EVALUATION:  Today's date: 11/11/2021    Deysi Stoner is a 26 year old female who presents for a preoperative evaluation.    Surgical Information:  Surgery/Procedure: Endometrial Mass Removal  Surgery Location: [facility]  Surgeon: Agbeh  Surgery Date: 11/16/21  Time of Surgery: 7am  Where patient plans to recover: At home with family  Fax number for surgical facility: Note does not need to be faxed, will be available electronically in Epic.    Type of Anesthesia Anticipated: to be determined    Assessment & Plan     The proposed surgical procedure is considered LOW risk.    Pre-Op    Benign essential hypertension      Endometrial mass    Risks and Recommendations:  The patient has the following additional risks and recommendations for perioperative complications:   - No identified additional risk factors other than previously addressed    Medication Instructions:  Patient is to take all scheduled medications on the day of surgery EXCEPT for modifications listed below: advised to refrain from blood thinners.     RECOMMENDATION:  APPROVAL GIVEN to proceed with proposed procedure, without further diagnostic evaluation.    20 minutes spent on the date of the encounter doing chart review, history and exam, documentation and further activities per the note    Subjective     HPI related to upcoming procedure: 2 uterine masses, changed from one mass on previous image    Preop Questions 11/11/2021   1. Have you ever had a heart attack or stroke? No   2. Have you ever had surgery on your heart or blood vessels, such as a stent placement, a coronary artery bypass, or surgery on an artery in your head, neck, heart, or legs? No   3. Do you have chest pain with activity? Yes- cardiac workup negative   4. Do  you have a history of heart failure? No   5. Do you currently have a cold, bronchitis or symptoms of other infection? No   6. Do you have a cough, shortness of breath, or wheezing? No   7. Do you or anyone in your family have previous history of blood clots? No   8. Do you or does anyone in your family have a serious bleeding problem such as prolonged bleeding following surgeries or cuts? No   9. Have you ever had problems with anemia or been told to take iron pills? No   10. Have you had any abnormal blood loss such as black, tarry or bloody stools, or abnormal vaginal bleeding? No   11. Have you ever had a blood transfusion? No   12. Are you willing to have a blood transfusion if it is medically needed before, during, or after your surgery? Yes   13. Have you or any of your relatives ever had problems with anesthesia? No   14. Do you have sleep apnea, excessive snoring or daytime drowsiness? No   15. Do you have any artifical heart valves or other implanted medical devices like a pacemaker, defibrillator, or continuous glucose monitor? No   16. Do you have artificial joints? No   17. Are you allergic to latex? No   18. Is there any chance that you may be pregnant? No       Health Care Directive:  Patient does not have a Health Care Directive or Living Will: Discussed advance care planning with patient; however, patient declined at this time.    Preoperative Review of :   reviewed - no record of controlled substances prescribed.      Status of Chronic Conditions:  HYPERTENSION - Patient has longstanding history of HTN , currently denies any symptoms referable to elevated blood pressure. Specifically denies chest pain, palpitations, dyspnea, orthopnea, PND or peripheral edema. Blood pressure readings have been in normal range. Current medication regimen is as listed below. Patient denies any side effects of medication.       Review of Systems  CONSTITUTIONAL: NEGATIVE for fever, chills, change in  weight  INTEGUMENTARY/SKIN: NEGATIVE for worrisome rashes, moles or lesions  EYES: NEGATIVE for vision changes or irritation  ENT/MOUTH: NEGATIVE for ear, mouth and throat problems  RESP: NEGATIVE for significant cough or SOB  CV: NEGATIVE for chest pain, palpitations or peripheral edema  GI: POSITIVE for RLQ abdominal pain and heartburn or reflux- hx barrets esophagus with recent EGD requiring 3 biopsies.   : NEGATIVE for frequency, dysuria, or hematuria  MUSCULOSKELETAL: NEGATIVE for significant arthralgias or myalgia  NEURO: NEGATIVE for weakness, dizziness or paresthesias  ENDOCRINE: NEGATIVE for temperature intolerance, skin/hair changes  HEME: NEGATIVE for bleeding problems  PSYCHIATRIC: NEGATIVE for changes in mood or affect    Patient Active Problem List    Diagnosis Date Noted     Veloz's esophagus with esophagitis 11/11/2021     Priority: Medium     Endometrial mass 10/27/2021     Priority: Medium     Added automatically from request for surgery [id number]       Benign essential hypertension 04/29/2021     Priority: Medium     Palpitations 05/06/2019     Priority: Medium     Anxiety 06/25/2018     Priority: Medium     Encounter for other contraceptive management 06/25/2018     Priority: Medium      History reviewed. No pertinent past medical history.  Past Surgical History:   Procedure Laterality Date     appenctomy Right 12/17/2020     ESOPHAGOSCOPY, GASTROSCOPY, DUODENOSCOPY (EGD), COMBINED N/A 07/15/2020    Procedure: ESOPHAGOGASTRODUODENOSCOPY, WITH BIOPSY;  Surgeon: Leventhal, Thomas Michael, MD;  Location: [facility] OR     Current Outpatient Medications   Medication Sig Dispense Refill     cromolyn (OPTICROM) 4 % ophthalmic solution INT 2 GTS IN OU QID 10 mL 10     CRYSELLE-28 0.3-30 MG-MCG tablet Take 1 tablet by mouth daily 90 tablet 3     fluticasone (FLONASE) 50 MCG/ACT nasal spray SHAKE LQ AND U 2 SPRAYS IEN QD 16 g 11     hydrOXYzine (ATARAX) 10 MG tablet TK 1 TO 3 TS PO  tablet 3      metoprolol succinate ER (TOPROL-XL) 25 MG 24 hr tablet TAKE ONE-HALF TABLET BY MOUTH ONCE DAILY 90 tablet 3     omeprazole (PRILOSEC) 20 MG DR capsule TAKE 1 CAPSULE BY MOUTH TWICE DAILY 30 MINUTES TO 1 HOUR BEFORE A MEAL 90 capsule 3     polyethylene glycol (MIRALAX) 17 GM/Dose powder Take 17 g (1 capful) by mouth daily 578 g 1     sucralfate (CARAFATE) 1 GM/10ML suspension          Allergies   Allergen Reactions     Amoxicillin      Penicillins Hives     Azithromycin Rash        Social History     Tobacco Use     Smoking status: Never Smoker     Smokeless tobacco: Never Used   Substance Use Topics     Alcohol use: Yes     Comment: social     History reviewed. No pertinent family history.  History   Drug Use Unknown         Objective     /83   Pulse 87   Temp 98.9  F (37.2  C) (Tympanic)   Resp 20   Wt 89.3 kg (196 lb 12.8 oz)   SpO2 99%   BMI 32.79 kg/m      Physical Exam    GENERAL APPEARANCE: healthy, alert and no distress     EYES: EOMI, PERRL     HENT: ear canals and TM's normal and nose and mouth without ulcers or lesions     NECK: no adenopathy, no asymmetry, masses, or scars and thyroid normal to palpation     RESP: lungs clear to auscultation - no rales, rhonchi or wheezes     CV: regular rates and rhythm, normal S1 S2, no S3 or S4 and no murmur, click or rub     ABDOMEN:  soft, nontender, no HSM or masses and bowel sounds normal     MS: extremities normal- no gross deformities noted, no evidence of inflammation in joints, FROM in all extremities.     SKIN: no suspicious lesions or rashes     NEURO: Normal strength and tone, sensory exam grossly normal, mentation intact and speech normal     PSYCH: mentation appears normal. and affect normal/bright     LYMPHATICS: No cervical adenopathy    Recent Labs   Lab Test 09/28/21  0930 04/29/21  0754 03/04/20  0850   HGB 13.6  --  15.2     --  265    135 139   POTASSIUM 4.0 4.3 4.1   CR 0.71 0.78 0.71        Diagnostics:  No labs were  ordered during this visit.   No EKG this visit, completed in the last 90 days.    Revised Cardiac Risk Index (RCRI):  The patient has the following serious cardiovascular risks for perioperative complications:   - No serious cardiac risks = 0 points     RCRI Interpretation: 0 points: Class I (very low risk - 0.4% complication rate)       Signed Electronically by: ABDOULAYE Diop  Copy of this evaluation report is provided to requesting physician.

## 2021-11-12 ENCOUNTER — LAB (OUTPATIENT)
Dept: URGENT CARE | Facility: URGENT CARE | Age: 26
End: 2021-11-12
Payer: COMMERCIAL

## 2021-11-12 DIAGNOSIS — N94.89 ENDOMETRIAL MASS: ICD-10-CM

## 2021-11-12 PROCEDURE — 99207 PR NO CHARGE LOS: CPT

## 2021-11-12 PROCEDURE — U0003 INFECTIOUS AGENT DETECTION BY NUCLEIC ACID (DNA OR RNA); SEVERE ACUTE RESPIRATORY SYNDROME CORONAVIRUS 2 (SARS-COV-2) (CORONAVIRUS DISEASE [COVID-19]), AMPLIFIED PROBE TECHNIQUE, MAKING USE OF HIGH THROUGHPUT TECHNOLOGIES AS DESCRIBED BY CMS-2020-01-R: HCPCS

## 2021-11-12 PROCEDURE — U0005 INFEC AGEN DETEC AMPLI PROBE: HCPCS

## 2021-11-14 LAB — SARS-COV-2 RNA RESP QL NAA+PROBE: NEGATIVE

## 2021-11-15 ENCOUNTER — ANESTHESIA EVENT (OUTPATIENT)
Dept: SURGERY | Facility: AMBULATORY SURGERY CENTER | Age: 26
End: 2021-11-15
Payer: COMMERCIAL

## 2021-11-16 ENCOUNTER — ANESTHESIA (OUTPATIENT)
Dept: SURGERY | Facility: AMBULATORY SURGERY CENTER | Age: 26
End: 2021-11-16
Payer: COMMERCIAL

## 2021-11-16 ENCOUNTER — HOSPITAL ENCOUNTER (OUTPATIENT)
Facility: AMBULATORY SURGERY CENTER | Age: 26
End: 2021-11-16
Attending: OBSTETRICS & GYNECOLOGY | Admitting: OBSTETRICS & GYNECOLOGY
Payer: COMMERCIAL

## 2021-11-16 VITALS
OXYGEN SATURATION: 99 % | SYSTOLIC BLOOD PRESSURE: 115 MMHG | HEART RATE: 77 BPM | RESPIRATION RATE: 14 BRPM | TEMPERATURE: 97.7 F | DIASTOLIC BLOOD PRESSURE: 79 MMHG

## 2021-11-16 DIAGNOSIS — N94.89 ENDOMETRIAL MASS: ICD-10-CM

## 2021-11-16 DIAGNOSIS — N84.0 ABNORMAL UTERINE BLEEDING DUE TO ENDOMETRIAL POLYP: Primary | ICD-10-CM

## 2021-11-16 DIAGNOSIS — N93.9 ABNORMAL UTERINE BLEEDING DUE TO ENDOMETRIAL POLYP: Primary | ICD-10-CM

## 2021-11-16 LAB — B-HCG SERPL-ACNC: <1 IU/L (ref 0–5)

## 2021-11-16 PROCEDURE — 84702 CHORIONIC GONADOTROPIN TEST: CPT | Performed by: OBSTETRICS & GYNECOLOGY

## 2021-11-16 PROCEDURE — G8907 PT DOC NO EVENTS ON DISCHARG: HCPCS

## 2021-11-16 PROCEDURE — 58558 HYSTEROSCOPY BIOPSY: CPT | Performed by: OBSTETRICS & GYNECOLOGY

## 2021-11-16 PROCEDURE — G8918 PT W/O PREOP ORDER IV AB PRO: HCPCS

## 2021-11-16 PROCEDURE — 88305 TISSUE EXAM BY PATHOLOGIST: CPT | Performed by: PATHOLOGY

## 2021-11-16 PROCEDURE — 58558 HYSTEROSCOPY BIOPSY: CPT

## 2021-11-16 RX ORDER — LABETALOL HYDROCHLORIDE 5 MG/ML
10 INJECTION, SOLUTION INTRAVENOUS
Status: DISCONTINUED | OUTPATIENT
Start: 2021-11-16 | End: 2021-11-17 | Stop reason: HOSPADM

## 2021-11-16 RX ORDER — DIAZEPAM 10 MG/2ML
2.5 INJECTION, SOLUTION INTRAMUSCULAR; INTRAVENOUS
Status: DISCONTINUED | OUTPATIENT
Start: 2021-11-16 | End: 2021-11-17 | Stop reason: HOSPADM

## 2021-11-16 RX ORDER — ACETAMINOPHEN 325 MG/1
975 TABLET ORAL ONCE
Status: COMPLETED | OUTPATIENT
Start: 2021-11-16 | End: 2021-11-16

## 2021-11-16 RX ORDER — SODIUM CHLORIDE, SODIUM LACTATE, POTASSIUM CHLORIDE, CALCIUM CHLORIDE 600; 310; 30; 20 MG/100ML; MG/100ML; MG/100ML; MG/100ML
INJECTION, SOLUTION INTRAVENOUS CONTINUOUS
Status: DISCONTINUED | OUTPATIENT
Start: 2021-11-16 | End: 2021-11-17 | Stop reason: HOSPADM

## 2021-11-16 RX ORDER — LIDOCAINE 40 MG/G
CREAM TOPICAL
Status: DISCONTINUED | OUTPATIENT
Start: 2021-11-16 | End: 2021-11-17 | Stop reason: HOSPADM

## 2021-11-16 RX ORDER — ONDANSETRON 4 MG/1
4 TABLET, ORALLY DISINTEGRATING ORAL EVERY 30 MIN PRN
Status: DISCONTINUED | OUTPATIENT
Start: 2021-11-16 | End: 2021-11-17 | Stop reason: HOSPADM

## 2021-11-16 RX ORDER — PROPOFOL 10 MG/ML
INJECTION, EMULSION INTRAVENOUS CONTINUOUS PRN
Status: DISCONTINUED | OUTPATIENT
Start: 2021-11-16 | End: 2021-11-16

## 2021-11-16 RX ORDER — DEXAMETHASONE SODIUM PHOSPHATE 4 MG/ML
INJECTION, SOLUTION INTRA-ARTICULAR; INTRALESIONAL; INTRAMUSCULAR; INTRAVENOUS; SOFT TISSUE PRN
Status: DISCONTINUED | OUTPATIENT
Start: 2021-11-16 | End: 2021-11-16

## 2021-11-16 RX ORDER — ACETAMINOPHEN 325 MG/1
975 TABLET ORAL ONCE
Status: DISCONTINUED | OUTPATIENT
Start: 2021-11-16 | End: 2021-11-17 | Stop reason: HOSPADM

## 2021-11-16 RX ORDER — ACETAMINOPHEN 325 MG/1
975 TABLET ORAL ONCE
Status: CANCELLED | OUTPATIENT
Start: 2021-11-16 | End: 2021-11-16

## 2021-11-16 RX ORDER — FENTANYL CITRATE 50 UG/ML
25-50 INJECTION, SOLUTION INTRAMUSCULAR; INTRAVENOUS
Status: DISCONTINUED | OUTPATIENT
Start: 2021-11-16 | End: 2021-11-17 | Stop reason: HOSPADM

## 2021-11-16 RX ORDER — ALBUTEROL SULFATE 0.83 MG/ML
2.5 SOLUTION RESPIRATORY (INHALATION) EVERY 4 HOURS PRN
Status: DISCONTINUED | OUTPATIENT
Start: 2021-11-16 | End: 2021-11-17 | Stop reason: HOSPADM

## 2021-11-16 RX ORDER — FENTANYL CITRATE 50 UG/ML
INJECTION, SOLUTION INTRAMUSCULAR; INTRAVENOUS PRN
Status: DISCONTINUED | OUTPATIENT
Start: 2021-11-16 | End: 2021-11-16

## 2021-11-16 RX ORDER — IBUPROFEN 400 MG/1
800 TABLET, FILM COATED ORAL ONCE
Status: CANCELLED | OUTPATIENT
Start: 2021-11-16 | End: 2021-11-16

## 2021-11-16 RX ORDER — FENTANYL CITRATE 50 UG/ML
25-50 INJECTION, SOLUTION INTRAMUSCULAR; INTRAVENOUS EVERY 5 MIN PRN
Status: DISCONTINUED | OUTPATIENT
Start: 2021-11-16 | End: 2021-11-17 | Stop reason: HOSPADM

## 2021-11-16 RX ORDER — LIDOCAINE HYDROCHLORIDE 20 MG/ML
INJECTION, SOLUTION INFILTRATION; PERINEURAL PRN
Status: DISCONTINUED | OUTPATIENT
Start: 2021-11-16 | End: 2021-11-16

## 2021-11-16 RX ORDER — ONDANSETRON 2 MG/ML
INJECTION INTRAMUSCULAR; INTRAVENOUS PRN
Status: DISCONTINUED | OUTPATIENT
Start: 2021-11-16 | End: 2021-11-16

## 2021-11-16 RX ORDER — ONDANSETRON 2 MG/ML
4 INJECTION INTRAMUSCULAR; INTRAVENOUS EVERY 30 MIN PRN
Status: DISCONTINUED | OUTPATIENT
Start: 2021-11-16 | End: 2021-11-17 | Stop reason: HOSPADM

## 2021-11-16 RX ORDER — CLINDAMYCIN PHOSPHATE 900 MG/50ML
900 INJECTION, SOLUTION INTRAVENOUS
Status: COMPLETED | OUTPATIENT
Start: 2021-11-16 | End: 2021-11-16

## 2021-11-16 RX ORDER — KETOROLAC TROMETHAMINE 30 MG/ML
30 INJECTION, SOLUTION INTRAMUSCULAR; INTRAVENOUS ONCE
Status: COMPLETED | OUTPATIENT
Start: 2021-11-16 | End: 2021-11-16

## 2021-11-16 RX ORDER — PROPOFOL 10 MG/ML
INJECTION, EMULSION INTRAVENOUS PRN
Status: DISCONTINUED | OUTPATIENT
Start: 2021-11-16 | End: 2021-11-16

## 2021-11-16 RX ORDER — MEPERIDINE HYDROCHLORIDE 25 MG/ML
12.5 INJECTION INTRAMUSCULAR; INTRAVENOUS; SUBCUTANEOUS
Status: DISCONTINUED | OUTPATIENT
Start: 2021-11-16 | End: 2021-11-17 | Stop reason: HOSPADM

## 2021-11-16 RX ORDER — GLYCOPYRROLATE 0.2 MG/ML
INJECTION, SOLUTION INTRAMUSCULAR; INTRAVENOUS PRN
Status: DISCONTINUED | OUTPATIENT
Start: 2021-11-16 | End: 2021-11-16

## 2021-11-16 RX ORDER — OXYCODONE HYDROCHLORIDE 5 MG/1
5-10 TABLET ORAL EVERY 4 HOURS PRN
Status: DISCONTINUED | OUTPATIENT
Start: 2021-11-16 | End: 2021-11-17 | Stop reason: HOSPADM

## 2021-11-16 RX ADMIN — GLYCOPYRROLATE 0.1 MG: 0.2 INJECTION, SOLUTION INTRAMUSCULAR; INTRAVENOUS at 08:17

## 2021-11-16 RX ADMIN — FENTANYL CITRATE 25 MCG: 50 INJECTION, SOLUTION INTRAMUSCULAR; INTRAVENOUS at 08:15

## 2021-11-16 RX ADMIN — ACETAMINOPHEN 975 MG: 325 TABLET ORAL at 07:14

## 2021-11-16 RX ADMIN — PROPOFOL 25 MG: 10 INJECTION, EMULSION INTRAVENOUS at 08:15

## 2021-11-16 RX ADMIN — LIDOCAINE HYDROCHLORIDE 60 MG: 20 INJECTION, SOLUTION INFILTRATION; PERINEURAL at 08:05

## 2021-11-16 RX ADMIN — PROPOFOL 100 MG: 10 INJECTION, EMULSION INTRAVENOUS at 08:05

## 2021-11-16 RX ADMIN — KETOROLAC TROMETHAMINE 30 MG: 30 INJECTION, SOLUTION INTRAMUSCULAR; INTRAVENOUS at 07:24

## 2021-11-16 RX ADMIN — DEXAMETHASONE SODIUM PHOSPHATE 4 MG: 4 INJECTION, SOLUTION INTRA-ARTICULAR; INTRALESIONAL; INTRAMUSCULAR; INTRAVENOUS; SOFT TISSUE at 08:10

## 2021-11-16 RX ADMIN — FENTANYL CITRATE 25 MCG: 50 INJECTION, SOLUTION INTRAMUSCULAR; INTRAVENOUS at 08:04

## 2021-11-16 RX ADMIN — CLINDAMYCIN PHOSPHATE 900 MG: 900 INJECTION, SOLUTION INTRAVENOUS at 07:54

## 2021-11-16 RX ADMIN — ONDANSETRON 4 MG: 2 INJECTION INTRAMUSCULAR; INTRAVENOUS at 08:30

## 2021-11-16 RX ADMIN — SODIUM CHLORIDE, SODIUM LACTATE, POTASSIUM CHLORIDE, CALCIUM CHLORIDE: 600; 310; 30; 20 INJECTION, SOLUTION INTRAVENOUS at 07:54

## 2021-11-16 RX ADMIN — PROPOFOL 30 MG: 10 INJECTION, EMULSION INTRAVENOUS at 08:20

## 2021-11-16 RX ADMIN — PROPOFOL 150 MCG/KG/MIN: 10 INJECTION, EMULSION INTRAVENOUS at 08:05

## 2021-11-16 NOTE — ANESTHESIA PROCEDURE NOTES
Airway       Patient location during procedure: OR       Procedure Start/Stop Times: 11/16/2021 8:21 AM  Staff -        CRNA: Alda Saucedo APRN CRNA       Performed By: anesthesiologist  Consent for Airway        Urgency: elective  Indications and Patient Condition       Indications for airway management: paty-procedural       Induction type:intravenous       Mask difficulty assessment: 0 - not attempted    Final Airway Details       Final airway type: supraglottic airway    Supraglottic Airway Details        Type: LMA       Brand: LMA Unique       LMA size: 4       Airway Seal Pressure (cm H2O): 18    Post intubation assessment        Placement verified by: capnometry, equal breath sounds and chest rise        Number of attempts at approach: 1       Number of other approaches attempted: 0       Secured with: commercial tube barakat and plastic tape       Ease of procedure: easy       Dentition: Intact and Unchanged

## 2021-11-16 NOTE — ANESTHESIA PREPROCEDURE EVALUATION
Anesthesia Pre-Procedure Evaluation    Patient: Deysi Stoner   MRN: [id number] : 1995        Preoperative Diagnosis: Endometrial mass [N94.89]    Procedure : Procedure(s):  Hysteroscopy, Dilatation and Curettage ,Polypectomy with MYOSURE.          History reviewed. No pertinent past medical history.   Past Surgical History:   Procedure Laterality Date     appenctomy Right 2020     ESOPHAGOSCOPY, GASTROSCOPY, DUODENOSCOPY (EGD), COMBINED N/A 07/15/2020    Procedure: ESOPHAGOGASTRODUODENOSCOPY, WITH BIOPSY;  Surgeon: Leventhal, Thomas Michael, MD;  Location: [facility] OR      Allergies   Allergen Reactions     Amoxicillin      Penicillins Hives     Azithromycin Rash      Social History     Tobacco Use     Smoking status: Never Smoker     Smokeless tobacco: Never Used   Substance Use Topics     Alcohol use: Yes     Comment: social      Wt Readings from Last 1 Encounters:   21 89.3 kg (196 lb 12.8 oz)        Anesthesia Evaluation            ROS/MED HX  ENT/Pulmonary:       Neurologic:       Cardiovascular:     (+) hypertension-----    METS/Exercise Tolerance:     Hematologic:       Musculoskeletal:       GI/Hepatic: Comment: barrets esophagus      Renal/Genitourinary:       Endo:       Psychiatric/Substance Use:     (+) psychiatric history anxiety     Infectious Disease:       Malignancy:       Other:            Physical Exam    Airway  airway exam normal      Mallampati: II   TM distance: > 3 FB   Neck ROM: full   Mouth opening: > 3 cm    Respiratory Devices and Support         Dental  no notable dental history         Cardiovascular          Rhythm and rate: regular and normal     Pulmonary   pulmonary exam normal        breath sounds clear to auscultation           OUTSIDE LABS:  CBC:   Lab Results   Component Value Date    WBC 7.5 2021    WBC 5.7 2020    HGB 13.6 2021    HGB 15.2 2020    HCT 40.9 2021    HCT 45.9 2020     2021     2020      BMP:   Lab Results   Component Value Date     09/28/2021     04/29/2021    POTASSIUM 4.0 09/28/2021    POTASSIUM 4.3 04/29/2021    CHLORIDE 108 09/28/2021    CHLORIDE 107 04/29/2021    CO2 26 09/28/2021    CO2 22 04/29/2021    BUN 6 (L) 09/28/2021    BUN 9 04/29/2021    CR 0.71 09/28/2021    CR 0.78 04/29/2021    GLC 90 09/28/2021    GLC 78 04/29/2021     COAGS: No results found for: PTT, INR, FIBR  POC:   Lab Results   Component Value Date    HCG Negative 07/15/2020     HEPATIC:   Lab Results   Component Value Date    ALBUMIN 4.0 09/28/2021    PROTTOTAL 8.0 09/28/2021    ALT 35 09/28/2021    AST 13 09/28/2021    ALKPHOS 90 09/28/2021    BILITOTAL 0.6 09/28/2021     OTHER:   Lab Results   Component Value Date    ROB 9.9 09/28/2021    PHOS 3.4 04/29/2021    LIPASE 78 09/28/2021    AMYLASE 45 09/28/2021    TSH 2.24 09/28/2021    CRP 5.3 09/28/2021    SED 9 09/28/2021       Anesthesia Plan    ASA Status:  2   NPO Status:  NPO Appropriate    Anesthesia Type: MAC.     - Reason for MAC: immobility needed, straight local not clinically adequate   Induction: Intravenous.   Maintenance: TIVA.        Consents    Anesthesia Plan(s) and associated risks, benefits, and realistic alternatives discussed. Questions answered and patient/representative(s) expressed understanding.     - Discussed with:  Patient      - Extended Intubation/Ventilatory Support Discussed: No.      - Patient is DNR/DNI Status: No    Use of blood products discussed: No .     Postoperative Care    Pain management: Oral pain medications, IV analgesics, Multi-modal analgesia.   PONV prophylaxis: Ondansetron (or other 5HT-3), Background Propofol Infusion, Dexamethasone or Solumedrol     Comments:                Clemente Orosco MD

## 2021-11-16 NOTE — PRE-PROCEDURE INSTRUCTIONS
I have seen the patient today, and I have reviewed the H&P on record.  At this time, there are no updates indicated.  She desires to continue with the planned procedure.  CEPHAS AGBEH, MD.

## 2021-11-16 NOTE — ANESTHESIA CARE TRANSFER NOTE
Patient: Deysi Stoner    Procedure: Procedure(s):  Hysteroscopy, Dilatation and Curettage ,Polypectomy with MYOSURE.       Diagnosis: Endometrial mass [N94.89]  Diagnosis Additional Information: No value filed.    Anesthesia Type:   MAC     Note:    Oropharynx: oropharynx clear of all foreign objects  Level of Consciousness: awake  Oxygen Supplementation: face mask  Level of Supplemental Oxygen (L/min / FiO2): 8  Independent Airway: airway patency satisfactory and stable  Dentition: dentition unchanged  Vital Signs Stable: post-procedure vital signs reviewed and stable  Report to RN Given: handoff report given  Patient transferred to: PACU  Comments: Prior to atraumatic LMA removal, patient awake, good aeration, SpO2 100%, equal bilateral breath sounds.  Transported to PACU on oxygen 8 lpm.  Patient awake, alert, SpO2 99% in PACU. No apparent anesthesia complications.   Handoff Report: Identifed the Patient, Identified the Reponsible Provider, Reviewed the pertinent medical history, Discussed the surgical course, Reviewed Intra-OP anesthesia mangement and issues during anesthesia, Set expectations for post-procedure period and Allowed opportunity for questions and acknowledgement of understanding      Vitals:  Vitals Value Taken Time   /75 11/16/21 0848   Temp     Pulse 96 11/16/21 0851   Resp 10 11/16/21 0851   SpO2 99 % 11/16/21 0851   Vitals shown include unvalidated device data.    Electronically Signed By: SAHARA Gastelum CRNA  November 16, 2021  8:51 AM

## 2021-11-16 NOTE — OP NOTE
"OPERATIVE REPORT:    Date of Procedure:  11/16/2021    Preoperative Diagnosis:  Endometrial Polyps    Postoperative Diagnosis:  SAME    Procedures:  Hysteroscopy and dilation and curettage with MyoSure     Surgeon:  CEPHAS AGBEH, MD.      OR personnel:       Anesthesia:  Monitor Anesthesia Care    Specimens:    Endometrial polyps  Endometrial curettings  Endocervical curettings    Complications:   None apparent     Estimated Blood Loss:  5 cc    Fluid Deficit:    100 cc    Condition on discharge from OR:  Satisfactory      Procedure in Detail:  Proper consents were obtained.  The patient and I reviewed the risks, benefits, goals and limitations. She is aware the fibroid might not be able to be removed hysteroscopically and there is a reoccurrence risk.   Her questions seemed to be answered.  After consenting to the procedure, the patient was taken to the OR where she was placed into the supine position.  She was then placed under MAC anesthesia after which she was placed into the dorsal lithotomy position.  She was then prepped and draped into the usual sterile fashion.  The bladder was drained with the in and out catheterization.  I then performed the exam under anesthesia.  The speculum was placed and the cervix was grasped with the single tooth tenaculum.  The endocervical curettage was performed and this was sent to pathology as a separate specimen.  The uterus was sounded to 7 cm.   The cervix was then dilated to a 6 Tita.  The hysteroscope was inserted in through the cervix.  The findings are noted:  Endometrial polyp anterior and posterior mid uterine.. The Myosure device then inserted and the polyps were  removed until remaining tissue was nearly flush with rest of endometrium. The uterus was deflated and re-inflated with the fluid and the Myosure was again used to removed additional tissue.  The Myosure was then used to \"brush\" over the endometrium for sampling.  After multiple passes the endometrial " [facility] Follow-Up    Patient: Pamela Sky; 70year old   MRN#: [id number]  Date of Service: 1/23/2018  Primary Provider: Mathieu Keller MD  Time: 35 minutes    Chief Complaint: No energy, weight gain, and anxiety  Not able to loose weight    Interval History/Subjective:   70year old with multiple medical problems  NO recent decompensations no Cardiac Catheterizations. Cardiac rhythm abnormalities    She moved to a new place. She is living alone. Her boyfriend is currently in care home and she has a restraining order against him for 4 years  She had a foot surgery This certainly increased her level of anxiety, she is taking 3 tablets of alprazolam and recognizes that she cannot continue but this is all too stressful for her. She is planning to cut down on her medication    Psychiatric Review of Symptoms:    Regarding symptoms of DEPRESSION, the patient has depressed mood, increased sleep, anhedonia,  low energy, poor concentration, decreased appetite, psychomotor slowing and denies suicidal ideation. She feels exacerbation happened more from her weight gain. Her symptoms have been stable on the current medication    Regarding episodes of PANIC, the patient Denies any recent episodes of impending doom, lost control or feelings of going crazy. .  Still struggles with palpitations   they seem to be under control with her current medications. She is quite compliant with taking all of these medications. she follows with Dr Robledo Pod closely     Medical history  1 Unspecified vitamin D deficiency    2. DM2 (diabetes mellitus, type 2)    3. Other and unspecified hyperlipidemia    4.   Unspecified hypothyroidism    5.  hypertension, Unspecified essential        Allergies:  ALLERGIES:   Allergen Reactions   â¢ Alendronate Sodium [Fosamax] VOMITING   â¢ Biaxin [Clarithromycin] SWELLING     Mouth swells   â¢ Celexa SWELLING     Mouth swelling     â¢ Codeine PRURITUS   â¢ Dilaudid [Hydromorphone Hcl] Other (See Comments)     hallucinations   â¢ Dye [Contrast Media] RASH   â¢ Erythromycin SWELLING     Mouth swells   â¢ Iodine HIVES   â¢ Lasix PRURITUS     Vaginal itch     â¢ Morphine Sulfate Other (See Comments)     hallucinations   â¢ Neocin [Neomycin-Bacitracin-Polymyxin] SWELLING     Mouth swelling   â¢ Oxycodone Nausea & Vomiting   â¢ Percocet [Oxycodone-Acetaminophen] Nausea & Vomiting   â¢ Sulfa Antibiotics PRURITUS     Vaginal itch   â¢ Tramadol HIVES and Other (See Comments)     Swollen lips   â¢ Valium [Diazepam] Other (See Comments)     hallucinations   â¢ Vicodin [Hydrocodone-Acetaminophen] Nausea & Vomiting   â¢ Zoloft Other (See Comments)     unknown       Medications:  Current Outpatient Prescriptions   Medication Sig Dispense Refill   â¢ HYDROcodone-acetaminophen (NORCO) 5-325 MG per tablet Take 1-2 tablets by mouth every 6 hours as needed for Pain. 40 tablet 0   â¢ CRESTOR 40 MG tablet Take 1 tablet by mouth daily. 90 tablet 1   â¢ PLAVIX 75 MG tablet TAKE 1 TABLET BY MOUTH EVERY DAY 90 tablet 0   â¢ ALPRAZolam (XANAX) 0.5 MG tablet Take 1 tablet by mouth 3 times daily as needed for Anxiety. 90 tablet 2   â¢ LOVAZA 1 g capsule TAKE 3 CAPSULES BY MOUTH IN THE EVENING 90 capsule 1   â¢ ibuprofen (MOTRIN) 800 MG tablet Take 1 tablet by mouth every 6 hours as needed for Pain. 60 tablet 0   â¢ ondansetron (ZOFRAN) 4 MG tablet Take 1 tablet by mouth every 8 hours as needed for Nausea. 20 tablet 0   â¢ iron polysaccharide complex (NIFEREX-150) 150 MG capsule Take 1 capsule by mouth daily (with dinner). 90 capsule 3   â¢ XARELTO 20 MG Tab TAKE 1 TABLET BY MOUTH DAILY 90 tablet 1   â¢ cyclobenzaprine (FLEXERIL) 10 MG tablet TAKE 1/2-1 TABLET BY MOUTH THREE TIMES DAILY AS NEEDED FOR MUSCLE SPASMS 60 tablet 5   â¢ nitroGLYcerin (NITROSTAT) 0.4 MG sublingual tablet PLACE 1 TABLET UNDER THE TONGUE EVERY 5 MINUTES AS NEEDED FOR CHEST PAIN.  GO TO ER OR CALL 911 IF SYMPTOMS AFTER 3 DOSES 25 tablet 5   â¢ diphenoxylate-atropine (LOMOTIL) 2.5-0.025 curettage was then sent to pathology.   The uterus was deflated and the instrumentation removed.       After the fractional dilation and curettage was completed, the vaginal instruments were removed and hemostasis was noted.  The patient was then taken out of the dorsal lithotomy position, awakened, and taken to the recovery room in stable condition.    No apparent complications.  Counts were correct x 4.     CEPHAS AGBEH, MD.     "MG tablet TAKE 1 TO 2 TABLETS BY MOUTH UP TO THREE TIMES DAILY AS NEEDED FOR DIARRHEA 30 tablet 5   â¢ EFFEXOR XR 37.5 MG 24 hr capsule Take 3 capsules by mouth daily. Dose: 3 capsules (=112.5 mg) 90 capsule 3   â¢ levothyroxine (SYNTHROID, LEVOTHROID) 88 MCG tablet TAKE 1 TABLET BY MOUTH DAILY 90 tablet 1   â¢ ezetimibe (ZETIA) 10 MG tablet Take one tablet by mouth every night 90 tablet 3   â¢ hydrOXYzine (ATARAX) 25 MG tablet TAKE 1 TABLET BY MOUTH EVERY 6 HOURS AS NEEDED FOR ITCHING 90 tablet 1   â¢ tiZANidine (ZANAFLEX) 2 MG tablet Take 1 tablet by mouth every 8 hours as needed for Muscle spasms (muscle spasm). NO DRIVING WITH THE MEDICATION ON BOARD. 30 tablet 1   â¢ PLAVIX 75 MG tablet TAKE 1 TABLET BY MOUTH DAILY 90 tablet 1   â¢ Vitamin D, Ergocalciferol, 45634 units capsule TAKE 1 CAPSULE BY MOUTH 1 TIME A WEEK 12 capsule 5   â¢ benzonatate (TESSALON PERLES) 100 MG capsule Take 1 capsule by mouth 3 times daily as needed for Cough. 20 capsule 0   â¢ pantoprazole (PROTONIX) 40 MG tablet TAKE 1 TABLET BY MOUTH EVERY DAY 90 tablet 3   â¢ polyethylene glycol (MIRALAX) powder Take 17 g by mouth daily as needed (constipation). 1 capful in 8 ounces of water or juice daily 527 g 1   â¢ albuterol-ipratropium 2.5 mg/0.5 mg (DUONEB) 0.5-2.5 (3) MG/3ML nebulizer solution Take 3 mLs by nebulization every 6 hours as needed for Wheezing or Shortness of Breath. 360 mL 1     No current facility-administered medications for this visit. Mental Status Exam:  Orientation: alert and oriented to person, place and situation   Appearance: appropriately groomed and casually dressed self conscious about not having the teeth.   Speech: appropriate rate, rhythm, volume and inflection   Eye contact: good  Behavior: cooperative   Psychomotor: no agitation, tic, tremor, slowing or abnormal movement noted   Mood: "" Good \""  Affect: congruent  Thought Process: linear, logical, goal oriented   Thought Content: no Suicidal Ideation, no homicidal " ideation, no AH/VH, not actively responding to internal stimuli   Insight: fair  Judgement: inact  Sensorium: grossly intact   Cognition:No  Major, overt deficits  Attention: Normal  Concentration: Intact    DSM V Diagnoses:  Panic Disorder  Major Depression stable  S/P CABG multiple medical problems    Plan:  1. To continue effexor  2. We would increase  her alprazolam to 0.5 mg3 times  a dayand we will decrease gradually   3. I will continue to see her once in 3 months, offered to communicate with primary MD who can take over the medication treatment  which she does not want to change rather will come and see me instead   4. Continue ongoing therapy with Dr Liliana Geronimo every 2-4 weeks. 5. We will follow as needed    Psychotherapy : (30 minutes)   target symptoms anxiety  Goal of therapy : reduction of symptoms,   Methods used to monitor outcome: patient self report  Rationale for type of therapy chosen: CBT  Patient's capacity to participate and benefit from treatment: good  treatment is expected to improve health status or function and  stabilize or maintain health or function    The patient processed the psychosocial stressors in therapy and showed awareness of discussed coping strategies. The patient was notified of after hour, holiday, and weekend coverage in case of emergencies and asked to contact [phone] or [phone] if emergencies arise. Ben Dawkins M.D.    Psychiatry

## 2021-11-16 NOTE — DISCHARGE INSTRUCTIONS
[facility]  Same-Day Surgery   Adult Discharge Orders & Instructions   For 24 hours after surgery  1. Get plenty of rest.  A responsible adult must stay with you for at least 24 hours after you leave the hospital.   2. Do not drive or use heavy equipment.  If you have weakness or tingling, don't drive or use heavy equipment until this feeling goes away.  3. Do not drink alcohol.  4. Avoid strenuous or risky activities.  Ask for help when climbing stairs.   5. You may feel lightheaded.  IF so, sit for a few minutes before standing.  Have someone help you get up.   6. If you have nausea (feel sick to your stomach): Drink only clear liquids such as apple juice, ginger ale, broth or 7-Up.  Rest may also help.  Be sure to drink enough fluids.  Move to a regular diet as you feel able.  7. You may have a slight fever. Call the doctor if your fever is over 100 F (37.7 C) (taken under the tongue) or lasts longer than 24 hours.  8. You may have a dry mouth, a sore throat, muscle aches or trouble sleeping.  These should go away after 24 hours.  9. Do not make important or legal decisions.   Call your doctor for any of the followin.  Signs of infection (fever, growing tenderness at the surgery site, a large amount of drainage or bleeding, severe pain, foul-smelling drainage, redness, swelling).    2. It has been over 8 to 10 hours since surgery and you are still not able to urinate (pass water).    3.  Headache for over 24 hours.    4.  Numbness, tingling or weakness the day after surgery (if you had spinal anesthesia).

## 2021-11-16 NOTE — ANESTHESIA POSTPROCEDURE EVALUATION
Patient: Deysi Stoner    Procedure: Procedure(s):  Hysteroscopy, Dilatation and Curettage ,Polypectomy with MYOSURE.       Diagnosis:Endometrial mass [N94.89]  Diagnosis Additional Information: No value filed.    Anesthesia Type:  MAC    Note:  Disposition: Outpatient   Postop Pain Control: Uneventful            Sign Out: Well controlled pain   PONV: No   Neuro/Psych: Uneventful            Sign Out: Acceptable/Baseline neuro status   Airway/Respiratory: Uneventful            Sign Out: Acceptable/Baseline resp. status   CV/Hemodynamics: Uneventful            Sign Out: Acceptable CV status   Other NRE: NONE   DID A NON-ROUTINE EVENT OCCUR? No           Last vitals:  Vitals Value Taken Time   /73 11/16/21 0915   Temp 36.4  C (97.6  F) 11/16/21 0848   Pulse 85 11/16/21 0918   Resp 10 11/16/21 0918   SpO2 99 % 11/16/21 0918   Vitals shown include unvalidated device data.    Electronically Signed By: Clemente Orosco MD  November 16, 2021  2:20 PM

## 2021-11-16 NOTE — DISCHARGE SUMMARY
Physician Discharge Summary     Patient ID:  Deysi Stoner  [id number]  26 year old  1995    Admit date: 11/16/2021    Discharge date and time: 11/16/2021     Admitting Physician: Cephas Mawuena Agbeh, MD     Discharge Physician: CEPHAS AGBEH, MD.      Admission Diagnoses: Endometrial PLOYPS    Discharge Diagnoses: SAME    Admission Condition: good    Discharged Condition: good    Treatments: surgery:Hysteroscopy D&C with MYOSURE POLYPECTOMY     Discharge Exam:  GENERAL APPEARANCE: healthy, alert and no distress  EYES: Eyes grossly normal to inspection, PERRL and conjunctivae and sclerae normal  HENT: ear canals and TM's normal, nose and mouth without ulcers or lesions, oropharynx clear and oral mucous membranes moist  NECK: no adenopathy, no asymmetry, masses, or scars and thyroid normal to palpation  RESP: lungs clear to auscultation - no rales, rhonchi or wheezes  BREAST: normal without masses, tenderness or nipple discharge and no palpable axillary masses or adenopathy  CV: regular rates and rhythm, normal S1 S2, no S3 or S4, no murmur, click or rub, no peripheral edema and peripheral pulses strong  ABDOMEN: soft, nontender, no hepatosplenomegaly, no masses and bowel sounds normal   (female): normal female external genitalia, vaginal mucosa pink, moist, well rugated and normal cervix, adnexae, and uterus without masses. Normal vaginal discharge  MS: no musculoskeletal defects are noted and gait is age appropriate without ataxia  SKIN: no suspicious lesions or rashes  NEURO: Normal strength and tone, sensory exam grossly normal, mentation intact and speech normal  PSYCH: mentation appears normal and affect normal/bright    Disposition: home    Patient Instructions:   Patient's Medications   New Prescriptions    No medications on file   Previous Medications    CROMOLYN (OPTICROM) 4 % OPHTHALMIC SOLUTION    INT 2 GTS IN OU QID    CRYSELLE-28 0.3-30 MG-MCG TABLET    Take 1 tablet by mouth daily     FLUTICASONE (FLONASE) 50 MCG/ACT NASAL SPRAY    SHAKE LQ AND U 2 SPRAYS IEN QD    HYDROXYZINE (ATARAX) 10 MG TABLET    TK 1 TO 3 TS PO QHS    METOPROLOL SUCCINATE ER (TOPROL-XL) 25 MG 24 HR TABLET    TAKE ONE-HALF TABLET BY MOUTH ONCE DAILY    OMEPRAZOLE (PRILOSEC) 20 MG DR CAPSULE    TAKE 1 CAPSULE BY MOUTH TWICE DAILY 30 MINUTES TO 1 HOUR BEFORE A MEAL    POLYETHYLENE GLYCOL (MIRALAX) 17 GM/DOSE POWDER    Take 17 g (1 capful) by mouth daily    SUCRALFATE (CARAFATE) 1 GM/10ML SUSPENSION       Modified Medications    No medications on file   Discontinued Medications    No medications on file     Activity: activity as tolerated, activity as tolerated and no driving for today, ambulate in house and no sex for 1 week   Diet: regular diet  Wound Care: none needed    Follow-up with CEPHAS AGBEH, MD.   in 2 weeks.    Signed:  Cephas Mawuena Agbeh, MD  11/16/2021  8:55 AM

## 2021-11-17 LAB
PATH REPORT.COMMENTS IMP SPEC: NORMAL
PATH REPORT.COMMENTS IMP SPEC: NORMAL
PATH REPORT.FINAL DX SPEC: NORMAL
PATH REPORT.GROSS SPEC: NORMAL
PATH REPORT.MICROSCOPIC SPEC OTHER STN: NORMAL
PATH REPORT.RELEVANT HX SPEC: NORMAL
PHOTO IMAGE: NORMAL

## 2021-12-29 ENCOUNTER — TRANSFERRED RECORDS (OUTPATIENT)
Dept: HEALTH INFORMATION MANAGEMENT | Facility: CLINIC | Age: 26
End: 2021-12-29
Payer: COMMERCIAL

## 2022-01-16 DIAGNOSIS — Z30.09 BIRTH CONTROL COUNSELING: ICD-10-CM

## 2022-01-17 RX ORDER — NORGESTREL-ETHINYL ESTRADIOL 0.3-0.03MG
1 TABLET ORAL DAILY
Qty: 84 TABLET | Refills: 0 | Status: SHIPPED | OUTPATIENT
Start: 2022-01-17 | End: 2022-05-05

## 2022-01-18 NOTE — TELEPHONE ENCOUNTER
"Prescription approved per [facility] Refill Protocol.  Requested Prescriptions   Pending Prescriptions Disp Refills     CRYSELLE-28 0.3-30 MG-MCG tablet [Pharmacy Med Name: CRYSELLE TABLETS 28] 84 tablet 0     Sig: TAKE 1 TABLET BY MOUTH DAILY       Contraceptives Protocol Passed - 1/17/2022  3:20 PM        Passed - Patient is not a current smoker if age is 35 or older        Passed - Recent (12 mo) or future (30 days) visit within the authorizing provider's specialty     Patient has had an office visit with the authorizing provider or a provider within the authorizing providers department within the previous 12 mos or has a future within next 30 days. See \"Patient Info\" tab in inbasket, or \"Choose Columns\" in Meds & Orders section of the refill encounter.              Passed - Medication is active on med list        Passed - No active pregnancy on record        Passed - No positive pregnancy test in past 12 months             "

## 2022-04-05 ENCOUNTER — TRANSFERRED RECORDS (OUTPATIENT)
Dept: HEALTH INFORMATION MANAGEMENT | Facility: CLINIC | Age: 27
End: 2022-04-05
Payer: COMMERCIAL

## 2022-04-06 ENCOUNTER — TRANSFERRED RECORDS (OUTPATIENT)
Dept: HEALTH INFORMATION MANAGEMENT | Facility: CLINIC | Age: 27
End: 2022-04-06
Payer: COMMERCIAL

## 2022-04-18 ENCOUNTER — OFFICE VISIT (OUTPATIENT)
Dept: FAMILY MEDICINE | Facility: CLINIC | Age: 27
End: 2022-04-18
Payer: COMMERCIAL

## 2022-04-18 ENCOUNTER — ANCILLARY PROCEDURE (OUTPATIENT)
Dept: GENERAL RADIOLOGY | Facility: CLINIC | Age: 27
End: 2022-04-18
Attending: PHYSICIAN ASSISTANT
Payer: COMMERCIAL

## 2022-04-18 VITALS
RESPIRATION RATE: 14 BRPM | TEMPERATURE: 99 F | SYSTOLIC BLOOD PRESSURE: 134 MMHG | BODY MASS INDEX: 33.39 KG/M2 | DIASTOLIC BLOOD PRESSURE: 92 MMHG | OXYGEN SATURATION: 97 % | HEIGHT: 65 IN | WEIGHT: 200.4 LBS | HEART RATE: 96 BPM

## 2022-04-18 DIAGNOSIS — Z20.822 SUSPECTED COVID-19 VIRUS INFECTION: ICD-10-CM

## 2022-04-18 DIAGNOSIS — R07.0 THROAT PAIN: Primary | ICD-10-CM

## 2022-04-18 DIAGNOSIS — R05.9 COUGH: ICD-10-CM

## 2022-04-18 LAB
DEPRECATED S PYO AG THROAT QL EIA: NEGATIVE
FLUAV AG SPEC QL IA: NEGATIVE
FLUBV AG SPEC QL IA: NEGATIVE
GROUP A STREP BY PCR: NOT DETECTED
SARS-COV-2 RNA RESP QL NAA+PROBE: NEGATIVE

## 2022-04-18 PROCEDURE — U0005 INFEC AGEN DETEC AMPLI PROBE: HCPCS | Performed by: PHYSICIAN ASSISTANT

## 2022-04-18 PROCEDURE — 99214 OFFICE O/P EST MOD 30 MIN: CPT | Mod: CS | Performed by: PHYSICIAN ASSISTANT

## 2022-04-18 PROCEDURE — 71046 X-RAY EXAM CHEST 2 VIEWS: CPT | Performed by: RADIOLOGY

## 2022-04-18 PROCEDURE — 87804 INFLUENZA ASSAY W/OPTIC: CPT | Performed by: PHYSICIAN ASSISTANT

## 2022-04-18 PROCEDURE — U0003 INFECTIOUS AGENT DETECTION BY NUCLEIC ACID (DNA OR RNA); SEVERE ACUTE RESPIRATORY SYNDROME CORONAVIRUS 2 (SARS-COV-2) (CORONAVIRUS DISEASE [COVID-19]), AMPLIFIED PROBE TECHNIQUE, MAKING USE OF HIGH THROUGHPUT TECHNOLOGIES AS DESCRIBED BY CMS-2020-01-R: HCPCS | Performed by: PHYSICIAN ASSISTANT

## 2022-04-18 PROCEDURE — 87651 STREP A DNA AMP PROBE: CPT | Performed by: PHYSICIAN ASSISTANT

## 2022-04-18 RX ORDER — ALBUTEROL SULFATE 90 UG/1
1-2 AEROSOL, METERED RESPIRATORY (INHALATION) EVERY 4 HOURS PRN
Qty: 6.7 G | Refills: 0 | Status: SHIPPED | OUTPATIENT
Start: 2022-04-18 | End: 2022-06-23

## 2022-04-18 RX ORDER — BENZONATATE 100 MG/1
100 CAPSULE ORAL 3 TIMES DAILY PRN
Qty: 25 CAPSULE | Refills: 0 | Status: SHIPPED | OUTPATIENT
Start: 2022-04-18 | End: 2022-06-23

## 2022-04-18 RX ORDER — DEXAMETHASONE 2 MG/1
10 TABLET ORAL ONCE
Qty: 10 TABLET | Refills: 0 | Status: SHIPPED | OUTPATIENT
Start: 2022-04-18 | End: 2022-08-26

## 2022-04-18 ASSESSMENT — PAIN SCALES - GENERAL: PAINLEVEL: NO PAIN (0)

## 2022-04-18 NOTE — PATIENT INSTRUCTIONS
Shahzad Jo,    Thank you for allowing [facility] to manage your care.    The steroid dexamethasone may decrease the effectiveness of your birth control while you are on the steroid. Please use a second form of birth control while on the dexamethasone.    I am unsure of the cause of your symptoms, but your exam is reassuring. We will see what our workup shows.     If you develop worsening/changing symptoms at any time, please call 911 or go to the emergency department for evaluation.    I ordered some xrays, please go to our radiology department to get your xrays.    I sent your prescriptions to your pharmacy.    Please allow 1-2 business days for our office to contact you in regards to your laboratory/radiological studies.  If not done so, I encourage you to login into [employer] ([email or web address] to review your results as well.     Drink 8-10 glasses of fluid daily to stay well-hydrated.    For your pain, please use Ibuprofen 400mg four times daily with food. Between ibuprofen doses, you may use Tylenol 650mg.     Max acetaminophen (Tylenol) 4,000mg/24 hours  Max ibuprofen 3,200mg/24 hours    For cough not controlled by other medications, please use benzonatate as prescribed. Do not use this medication while driving, operating machinery, with other sedating medications, or while drinking alcohol as it will make you drowsy.    If you have any questions or concerns, please feel free to call us at [phone]    Sincerely,    Byron Paulson PA-C    Did you know?      You can schedule a video visit for follow-up appointments as well as future appointments for certain conditions.  Please see the below link.     [email or web address]    If you have not already done so,  I encourage you to sign up for [employer] ([email or web address]).  This will allow you to review your results, securely communicate with a provider, and schedule virtual visits as  well.

## 2022-04-18 NOTE — PROGRESS NOTES
Assessment & Plan   Problem List Items Addressed This Visit    None     Visit Diagnoses     Throat pain    -  Primary    Relevant Medications    dexamethasone (DECADRON) 2 MG tablet    Other Relevant Orders    Streptococcus A Rapid Screen w/Reflex to PCR - Clinic Collect (Completed)    Symptomatic; Yes; 4/9/2022 COVID-19 Virus (Coronavirus) by PCR Nose    Group A Streptococcus PCR Throat Swab    Cough        Relevant Medications    benzonatate (TESSALON) 100 MG capsule    albuterol (PROAIR HFA/PROVENTIL HFA/VENTOLIN HFA) 108 (90 Base) MCG/ACT inhaler    Other Relevant Orders    Symptomatic; Yes; 4/9/2022 COVID-19 Virus (Coronavirus) by PCR Nose    Influenza A/B antigen (Completed)    XR Chest 2 Views (Completed)    Suspected COVID-19 virus infection        Relevant Orders    Symptomatic; Yes; 4/9/2022 COVID-19 Virus (Coronavirus) by PCR Nose         Impression is likely viral URI including COVID-19, though home tests have been negative. Will order COVID-19 PCR. Strep and influenza negative. Appears well and non-toxic and I have low suspicion for impending airway obstruction or respiratory distress. CXR shows no pneumonia, pneumothorax, pleural effusion, edema, or other worrisome process.   She will push p.o. fluids, use over-the-counter meds for symptoms, dexamethasone for pharyngitis, benzonatate, albuterol and follow-up with us in 2 weeks if not improving or urgent care/the ER if symptoms worsen/change at any time. Recommended COVID-19 vaccination.    Complete history and physical exam as below. AF with normal VS except for elevated bp, which we will monitor.    DDx and Dx discussed with and explained to the pt to their satisfaction.  All questions were answered at this time. Pt expressed understanding of and agreement with this dx, tx, and plan. No further workup warranted and standard medication warnings given. I have given the patient a list of pertinent indications for re-evaluation. Will go to the Emergency  "Department if symptoms worsen or new concerning symptoms arise. Patient left in no apparent distress.     38 minutes spent on the date of the encounter doing chart review, history and exam, documentation and further activities per the note     BMI:   Estimated body mass index is 33.51 kg/m  as calculated from the following:    Height as of this encounter: 1.647 m (5' 4.84\").    Weight as of this encounter: 90.9 kg (200 lb 6.4 oz).     See Patient Instructions    Return in about 2 weeks (around 5/2/2022) for a recheck of your symptoms if not improving, or call 911/go to an ER anytime if worsening.    NATHAN Sullivan  [facility] JIMMY Jo is a 26 year old who presents for the following health issues   HPI     Acute Illness  Acute illness concerns: Chest pain, sore throat, cough, stiff neck  Onset/Duration: 4/9/22  Symptoms:  Fever: no  Chills/Sweats: no  Headache (location?): no  Sinus Pressure: no  Conjunctivitis:  no  Ear Pain: no  Rhinorrhea: no  Congestion: no  Sore Throat: YES- over a week  Cough: YES-productive of green sputum  Wheeze: no  Decreased Appetite: no  Nausea: no  Vomiting: no  Diarrhea: no  Dysuria/Freq.: no  Dysuria or Hematuria: no  Fatigue/Achiness: no  Sick/Strep Exposure: no  Therapies tried and outcome: Michael    Works for [employer] in [employer]. Negative home test for COVID yesterday and one week ago. Feels that her sore throat began after vomiting on the first day of her symptoms. No melena, hematemesis or other symptoms.     Review of Systems   Constitutional, HEENT, cardiovascular, pulmonary, gi and gu systems are negative, except as otherwise noted.      Objective    BP (!) 134/92   Pulse 96   Temp 99  F (37.2  C) (Tympanic)   Resp 14   Ht 1.647 m (5' 4.84\")   Wt 90.9 kg (200 lb 6.4 oz)   LMP 03/25/2022 (Approximate)   SpO2 97%   BMI 33.51 kg/m    Body mass index is 33.51 kg/m .  Physical Exam  Vitals and nursing note reviewed. "   Constitutional:       General: She is not in acute distress.     Appearance: She is not ill-appearing or diaphoretic.   HENT:      Head: Normocephalic and atraumatic.      Right Ear: Tympanic membrane, ear canal and external ear normal.      Left Ear: Tympanic membrane, ear canal and external ear normal.      Mouth/Throat:      Mouth: Mucous membranes are moist.      Comments: Faint erythema to posterior oropharynx.  Eyes:      Conjunctiva/sclera: Conjunctivae normal.   Neck:      Comments: No masses or tenderness to anterior neck/throat.  Cardiovascular:      Rate and Rhythm: Normal rate and regular rhythm.      Heart sounds: Normal heart sounds. No murmur heard.    No friction rub. No gallop.   Pulmonary:      Effort: Pulmonary effort is normal. No respiratory distress.      Breath sounds: Normal breath sounds. No stridor. No wheezing, rhonchi or rales.   Abdominal:      General: Bowel sounds are normal. There is no distension.      Palpations: Abdomen is soft. There is no mass.      Tenderness: There is no abdominal tenderness. There is no guarding or rebound.      Hernia: No hernia is present.   Musculoskeletal:      Cervical back: Neck supple. No rigidity.   Lymphadenopathy:      Cervical: No cervical adenopathy.   Skin:     General: Skin is warm and dry.   Neurological:      General: No focal deficit present.      Mental Status: She is alert. Mental status is at baseline.   Psychiatric:         Mood and Affect: Mood normal.         Behavior: Behavior normal.          Results for orders placed or performed in visit on 04/18/22   XR Chest 2 Views     Status: None    Narrative    XR CHEST 2 VW  4/18/2022 9:41 AM       INDICATION: Cough  COMPARISON: None       Impression    IMPRESSION: Negative chest.    RAKESH KIRKLAND MD         SYSTEM ID:  [id number]   Results for orders placed or performed in visit on 04/18/22   Streptococcus A Rapid Screen w/Reflex to PCR - Clinic Collect     Status: Normal    Specimen:  Throat; Swab   Result Value Ref Range    Group A Strep antigen Negative Negative   Influenza A/B antigen     Status: Normal    Specimen: Nose; Swab   Result Value Ref Range    Influenza A antigen Negative Negative    Influenza B antigen Negative Negative    Narrative    Test results must be correlated with clinical data. If necessary, results should be confirmed by a molecular assay or viral culture.

## 2022-05-02 DIAGNOSIS — Z30.09 BIRTH CONTROL COUNSELING: ICD-10-CM

## 2022-05-05 RX ORDER — NORGESTREL-ETHINYL ESTRADIOL 0.3-0.03MG
1 TABLET ORAL DAILY
Qty: 28 TABLET | Refills: 0 | Status: SHIPPED | OUTPATIENT
Start: 2022-05-05 | End: 2022-08-01

## 2022-05-05 NOTE — TELEPHONE ENCOUNTER
Routing refill request to provider for review/approval because:  Mechelle given x1 and patient did not follow up, please advise

## 2022-05-11 DIAGNOSIS — I10 BENIGN ESSENTIAL HYPERTENSION: ICD-10-CM

## 2022-05-13 RX ORDER — METOPROLOL SUCCINATE 25 MG/1
TABLET, EXTENDED RELEASE ORAL
Qty: 90 TABLET | Refills: 0 | Status: SHIPPED | OUTPATIENT
Start: 2022-05-13 | End: 2022-08-12

## 2022-05-13 NOTE — TELEPHONE ENCOUNTER
Prescription approved per [facility] Refill Protocol.  Debra Stringer RN

## 2022-06-13 NOTE — TELEPHONE ENCOUNTER
SPINE PATIENTS - NEW PROTOCOL PREVISIT    RECORDS RECEIVED FROM: Internal/[place] ED   REASON FOR VISIT: back & neck pain    Date of Appt: 06/23/2022   NOTES (FOR ALL VISITS) STATUS DETAILS   OFFICE NOTE from referring provider N/A    OFFICE NOTE from other specialist N/A    DISCHARGE SUMMARY from hospital N/A    DISCHARGE REPORT from ER Care Everywhere 10/12/2021 [facility] [address] [facility]    EMG REPORT N/A    MEDICATION LIST N/A    IMAGING  (FOR ALL VISITS)     MRI (HEAD, NECK, SPINE) N/A    XRAY (SPINE) *NEUROSURGERY* Received 10/12/2021 cervical spine   CT (HEAD, NECK, SPINE) N/A       Images in PACS  Laura Beatty on 6/17/2022 at 8:28 AM

## 2022-06-15 NOTE — PROGRESS NOTES
"    SUBJECTIVE:  HPI:  Deysi Stoner  Is a 26 year old female who presents today for new patient evaluation of low back and neck pain.  [facility] ED note dated 10/12/2021 records the following: \"Deysi Stoner is a 26 y.o. female with a history of hypertension who presents to the emergency department for evaluation of neck pain. The patient reports left sided neck pain that started 2 days ago. The patient initially thought it was a knot, but she states that her pain increased in severity after she tried to crack her neck. She endorses intermittent jolts of pain in her neck in addition to constant soreness and pain with movement of the neck. This pain is also associated with intermittent numbness and tingling in her left arm. The patient reports that she hasn't been able to sleep for the past 2 days secondary to pain and she had to leave work today because she could not bend her neck to use a computer. The patient denies any head trauma or fever and states that she has never experienced pain like this in the past. The patient endorses drinking socially and denies smoking or using other drugs.\"    She was neurologically intact that day.      C-spine x-ray showed reversal of normal cervical lordosis but no other abnormalities.      She was reporting paresthesias down in the left forearm.  No advanced imaging was ordered.  She was advised to use ibuprofen and Tylenol along with Robaxin and prednisone and follow-up as needed with the primary care doctor.  She was treated and released.    No other records or imaging is available for this patient regarding her neck or back.    Other records document treatment for COVID-19 pneumonia in the emergency department on 5/16/2022    She got a little better after her October emergency department visit and the left arm symptoms have gone away.  For over 6 months she has had \"a bump\" on the left side of her neck, pain into her upper lower quadrant on the left and " "some headaches on the left.  No mechanism of injury that she can think of.  No radiating arm pain numbness tingling or weakness.  Once in a while she will feel like she is dropping things more not finding the keys on the keyboard but that is not a regular occurrence.  She has no leg weakness or balance problems or trouble swallowing.      SYMPTOMS WORSENED WITH sleeping    SYMPTOMS IMPROVED WITH nothing    Pain score and diagram reviewed.  See questionnaire.      ROS: Negative for cancer or long-term steroid use.  Otherwise negative for bowel/bladder dysfunction, balance changes, headache, leg pain/numbness/weakness, fevers, chills, night sweats, unexplained weight loss;  otherwise unremarkable.   See the patient's intake questionnaire from today for details.    Treatment to Date: Chiropractic with short-term relief.  No PT or medication    MEDICATIONS:  Reviewed.    ALLERGIES:  Reviewed.     Reviewed past medical, surgical, and family history.    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  No children.  She has a relatively sedentary life but does enjoy hiking and walking her dog named \"Yeyo\", and he weighs 10 pounds.      OBJECTIVE:    --CONSTITUTIONAL:   No acute distress.  The patient is well nourished and well groomed.  Transitions well and moves fluidly.  Modestly elevated BMI.  Pantomime of the workstation show significantly deficient ergonomics, hunched forward at the waist, extended at the neck, with elbows elevated and forward flexed with the keyboard too high.  --PSYCHIATRIC:  Appropriate mood and affect. The patient is awake, alert, oriented to person, place, time and answering questions appropriately with clear speech.    --SKIN:  Skin over the face, bilateral lower extremities, and posterior torso is clean, dry, intact without rashes.    --RESPIRATORY: Normal " respiratory excursion and effort, and no dyspnea.   --GAIT:  is non-antalgic. Flat foot, heel and toe walking:  normal   .  Squat and rise   normal    .  Shoulder range of motion full fluid hyper normal and symmetric and painless  --STANDING EXAMINATION:    Symmetry of spine/pelvis mild scoliosis with some hyper upper thoracic kyphosis and compensatory cervical lordosis.      Range of motion hyper normal cervical range of motion with no significant pain.  Spurling's maneuver negative.  Tender in the left upper quadrant but no active trigger points.  No palpable spasm the lump she is feeling is a small lipoma just to the left of C7 and it is nontender.     --NEUROLOGICAL:     ROMBERG, TANDEM WALK, PRONATOR DRIFT:   Normal.   .  SENSATION to light touch is intact in bilateral arms legs and into the torso.  .  MANUAL MOTOR TESTING: C5-T1 myotomes, axillary, median, radial, ulnar nerves  DURAL STRETCH TESTS:  SLR neck.     IMAGING: No images available.  See history above for C-spine x-ray report    ASSESSMENT: Deysi Stoner is a 26 year old female who presents  today for new patient evaluation of:      Myofascial pain neck and upper back    Poor ergonomics at work    Mild kyphoscoliosis with compensatory cervical lordosis      PLAN:  Adjust your office ergonomics as I discussed and look up office ergonomics online and you will see many diagrams of proper way to adjust it.  Physical therapy.  I want her to buy a Thera cane and I will have her therapist teach her how to use that if in a month she is not happy with her outcome I will have her come back and see me and neck steps might be imaging of the neck as well as consideration of a MedX program.    Advised patient to call or return early if symptoms worsen, or having problems controlling bladder and bowel function or worsening leg weakness.     Please note: Voice recognition software was used in this dictation.  It may therefore contain typographical  errors.    Jone Baires MD

## 2022-06-21 ENCOUNTER — TRANSFERRED RECORDS (OUTPATIENT)
Dept: HEALTH INFORMATION MANAGEMENT | Facility: CLINIC | Age: 27
End: 2022-06-21

## 2022-06-23 ENCOUNTER — OFFICE VISIT (OUTPATIENT)
Dept: NEUROSURGERY | Facility: CLINIC | Age: 27
End: 2022-06-23
Payer: COMMERCIAL

## 2022-06-23 ENCOUNTER — PRE VISIT (OUTPATIENT)
Dept: NEUROSURGERY | Facility: CLINIC | Age: 27
End: 2022-06-23
Payer: COMMERCIAL

## 2022-06-23 VITALS
HEIGHT: 66 IN | WEIGHT: 194 LBS | BODY MASS INDEX: 31.18 KG/M2 | SYSTOLIC BLOOD PRESSURE: 124 MMHG | HEART RATE: 80 BPM | DIASTOLIC BLOOD PRESSURE: 90 MMHG

## 2022-06-23 DIAGNOSIS — M79.18 MYOFASCIAL PAIN: ICD-10-CM

## 2022-06-23 DIAGNOSIS — M54.2 NECK PAIN: Primary | ICD-10-CM

## 2022-06-23 PROCEDURE — 99204 OFFICE O/P NEW MOD 45 MIN: CPT | Performed by: PREVENTIVE MEDICINE

## 2022-06-23 ASSESSMENT — PAIN SCALES - GENERAL: PAINLEVEL: MODERATE PAIN (5)

## 2022-06-23 NOTE — LETTER
"    6/23/2022         RE: Deysi Stoner  [address]  [place] [address]        Dear Colleague,    Thank you for referring your patient, Deysi Stoner, to the [facility] [place]. Please see a copy of my visit note below.        SUBJECTIVE:  HPI:  Deysi Stoner  Is a 26 year old female who presents today for new patient evaluation of low back and neck pain.  [facility] ED note dated 10/12/2021 records the following: \"Deysi Stoner is a 26 y.o. female with a history of hypertension who presents to the emergency department for evaluation of neck pain. The patient reports left sided neck pain that started 2 days ago. The patient initially thought it was a knot, but she states that her pain increased in severity after she tried to crack her neck. She endorses intermittent jolts of pain in her neck in addition to constant soreness and pain with movement of the neck. This pain is also associated with intermittent numbness and tingling in her left arm. The patient reports that she hasn't been able to sleep for the past 2 days secondary to pain and she had to leave work today because she could not bend her neck to use a computer. The patient denies any head trauma or fever and states that she has never experienced pain like this in the past. The patient endorses drinking socially and denies smoking or using other drugs.\"    She was neurologically intact that day.      C-spine x-ray showed reversal of normal cervical lordosis but no other abnormalities.      She was reporting paresthesias down in the left forearm.  No advanced imaging was ordered.  She was advised to use ibuprofen and Tylenol along with Robaxin and prednisone and follow-up as needed with the primary care doctor.  She was treated and released.    No other records or imaging is available for this patient regarding her neck or back.    Other records document treatment for COVID-19 pneumonia " "in the emergency department on 5/16/2022    She got a little better after her October emergency department visit and the left arm symptoms have gone away.  For over 6 months she has had \"a bump\" on the left side of her neck, pain into her upper lower quadrant on the left and some headaches on the left.  No mechanism of injury that she can think of.  No radiating arm pain numbness tingling or weakness.  Once in a while she will feel like she is dropping things more not finding the keys on the keyboard but that is not a regular occurrence.  She has no leg weakness or balance problems or trouble swallowing.      SYMPTOMS WORSENED WITH sleeping    SYMPTOMS IMPROVED WITH nothing    Pain score and diagram reviewed.  See questionnaire.      ROS: Negative for cancer or long-term steroid use.  Otherwise negative for bowel/bladder dysfunction, balance changes, headache, leg pain/numbness/weakness, fevers, chills, night sweats, unexplained weight loss;  otherwise unremarkable.   See the patient's intake questionnaire from today for details.    Treatment to Date: Chiropractic with short-term relief.  No PT or medication    MEDICATIONS:  Reviewed.    ALLERGIES:  Reviewed.     Reviewed past medical, surgical, and family history.    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  No children.  She has a relatively sedentary life but does enjoy hiking and walking her dog named \"Yeyo\", and he weighs 10 pounds.      OBJECTIVE:    --CONSTITUTIONAL:   No acute distress.  The patient is well nourished and well groomed.  Transitions well and moves fluidly.  Modestly elevated BMI.  Pantomime of the workstation show significantly deficient ergonomics, hunched forward at the waist, extended at the neck, with elbows elevated and forward flexed with the keyboard too high.  --PSYCHIATRIC:  Appropriate mood and " affect. The patient is awake, alert, oriented to person, place, time and answering questions appropriately with clear speech.    --SKIN:  Skin over the face, bilateral lower extremities, and posterior torso is clean, dry, intact without rashes.    --RESPIRATORY: Normal respiratory excursion and effort, and no dyspnea.   --GAIT:  is non-antalgic. Flat foot, heel and toe walking:  normal   .  Squat and rise   normal    .  Shoulder range of motion full fluid hyper normal and symmetric and painless  --STANDING EXAMINATION:    Symmetry of spine/pelvis mild scoliosis with some hyper upper thoracic kyphosis and compensatory cervical lordosis.      Range of motion hyper normal cervical range of motion with no significant pain.  Spurling's maneuver negative.  Tender in the left upper quadrant but no active trigger points.  No palpable spasm the lump she is feeling is a small lipoma just to the left of C7 and it is nontender.     --NEUROLOGICAL:     ROMBERG, TANDEM WALK, PRONATOR DRIFT:   Normal.   .  SENSATION to light touch is intact in bilateral arms legs and into the torso.  .  MANUAL MOTOR TESTING: C5-T1 myotomes, axillary, median, radial, ulnar nerves  DURAL STRETCH TESTS:  SLR neck.     IMAGING: No images available.  See history above for C-spine x-ray report    ASSESSMENT: Deysi Stoner is a 26 year old female who presents  today for new patient evaluation of:      Myofascial pain neck and upper back    Poor ergonomics at work    Mild kyphoscoliosis with compensatory cervical lordosis      PLAN:  Adjust your office ergonomics as I discussed and look up office ergonomics online and you will see many diagrams of proper way to adjust it.  Physical therapy.  I want her to buy a Thera cane and I will have her therapist teach her how to use that if in a month she is not happy with her outcome I will have her come back and see me and neck steps might be imaging of the neck as well as consideration of a MedX  program.    Advised patient to call or return early if symptoms worsen, or having problems controlling bladder and bowel function or worsening leg weakness.     Please note: Voice recognition software was used in this dictation.  It may therefore contain typographical errors.    Jone Baires MD             Again, thank you for allowing me to participate in the care of your patient.        Sincerely,        Jone Baires MD

## 2022-06-23 NOTE — NURSING NOTE
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  Work status?  Full time.    Sports: n  Activities: walking             BP (!) 124/90   Pulse 80   Ht 1.676 m (5' 6\")   Wt 88 kg (194 lb)   BMI 31.31 kg/m        Allergies   Allergen Reactions     Amoxicillin      Penicillins Hives     Azithromycin Rash       Current Outpatient Medications   Medication     cromolyn (OPTICROM) 4 % ophthalmic solution     fluticasone (FLONASE) 50 MCG/ACT nasal spray     hydrOXYzine (ATARAX) 10 MG tablet     magnesium hydroxide (MILK OF MAGNESIA) 400 MG/5ML suspension     metoprolol succinate ER (TOPROL XL) 25 MG 24 hr tablet     norgestrel-ethinyl estradiol (CRYSELLE-28) 0.3-30 MG-MCG tablet     omeprazole (PRILOSEC) 20 MG DR capsule     dexamethasone (DECADRON) 2 MG tablet     No current facility-administered medications for this visit.         Darla Severin-Brown, LPN  "

## 2022-06-24 DIAGNOSIS — F41.9 ANXIETY: ICD-10-CM

## 2022-06-24 DIAGNOSIS — J30.2 SEASONAL ALLERGIC RHINITIS, UNSPECIFIED TRIGGER: ICD-10-CM

## 2022-06-27 RX ORDER — HYDROXYZINE HYDROCHLORIDE 10 MG/1
TABLET, FILM COATED ORAL
Qty: 180 TABLET | Refills: 0 | Status: SHIPPED | OUTPATIENT
Start: 2022-06-27 | End: 2022-08-12

## 2022-06-27 RX ORDER — FLUTICASONE PROPIONATE 50 MCG
SPRAY, SUSPENSION (ML) NASAL
Qty: 48 G | Refills: 0 | Status: SHIPPED | OUTPATIENT
Start: 2022-06-27 | End: 2022-08-12

## 2022-07-16 ENCOUNTER — HEALTH MAINTENANCE LETTER (OUTPATIENT)
Age: 27
End: 2022-07-16

## 2022-07-27 ENCOUNTER — TRANSFERRED RECORDS (OUTPATIENT)
Dept: HEALTH INFORMATION MANAGEMENT | Facility: CLINIC | Age: 27
End: 2022-07-27

## 2022-07-29 DIAGNOSIS — Z30.09 BIRTH CONTROL COUNSELING: ICD-10-CM

## 2022-08-01 RX ORDER — NORGESTREL-ETHINYL ESTRADIOL 0.3-0.03MG
1 TABLET ORAL DAILY
Qty: 28 TABLET | Refills: 0 | Status: SHIPPED | OUTPATIENT
Start: 2022-08-01 | End: 2022-08-12

## 2022-08-01 NOTE — TELEPHONE ENCOUNTER
"Routing refill request to provider for review/approval because:  Unclear when follow up visit is needed      Requested Prescriptions   Pending Prescriptions Disp Refills     norgestrel-ethinyl estradiol (CRYSELLE-28) 0.3-30 MG-MCG tablet 28 tablet 0     Sig: Take 1 tablet by mouth daily       Contraceptives Protocol Passed - 7/29/2022  4:39 PM        Passed - Patient is not a current smoker if age is 35 or older        Passed - Recent (12 mo) or future (30 days) visit within the authorizing provider's specialty     Patient has had an office visit with the authorizing provider or a provider within the authorizing providers department within the previous 12 mos or has a future within next 30 days. See \"Patient Info\" tab in inbasket, or \"Choose Columns\" in Meds & Orders section of the refill encounter.              Passed - Medication is active on med list        Passed - No active pregnancy on record        Passed - No positive pregnancy test in past 12 months           Gisella Jack RN    "

## 2022-08-03 DIAGNOSIS — J30.2 SEASONAL ALLERGIC RHINITIS, UNSPECIFIED TRIGGER: ICD-10-CM

## 2022-08-03 DIAGNOSIS — H04.123 DRY EYES: ICD-10-CM

## 2022-08-04 RX ORDER — CROMOLYN SODIUM 40 MG/ML
SOLUTION/ DROPS OPHTHALMIC
Qty: 10 ML | Refills: 10 | Status: SHIPPED | OUTPATIENT
Start: 2022-08-04 | End: 2023-09-18

## 2022-08-04 NOTE — TELEPHONE ENCOUNTER
Routing refill request to provider for review/approval because:  Drug not on the [facility] refill protocol     Khushi Barrow RN BSN  [facility]

## 2022-08-12 ENCOUNTER — MYC REFILL (OUTPATIENT)
Dept: FAMILY MEDICINE | Facility: CLINIC | Age: 27
End: 2022-08-12

## 2022-08-12 DIAGNOSIS — I10 BENIGN ESSENTIAL HYPERTENSION: ICD-10-CM

## 2022-08-12 DIAGNOSIS — Z30.09 BIRTH CONTROL COUNSELING: ICD-10-CM

## 2022-08-12 DIAGNOSIS — J30.2 SEASONAL ALLERGIC RHINITIS, UNSPECIFIED TRIGGER: ICD-10-CM

## 2022-08-12 DIAGNOSIS — F41.9 ANXIETY: ICD-10-CM

## 2022-08-12 RX ORDER — HYDROXYZINE HYDROCHLORIDE 10 MG/1
TABLET, FILM COATED ORAL
Qty: 180 TABLET | Refills: 0 | Status: SHIPPED | OUTPATIENT
Start: 2022-08-12 | End: 2022-08-26

## 2022-08-12 RX ORDER — NORGESTREL-ETHINYL ESTRADIOL 0.3-0.03MG
1 TABLET ORAL DAILY
Qty: 84 TABLET | Refills: 0 | Status: SHIPPED | OUTPATIENT
Start: 2022-08-12 | End: 2022-08-26

## 2022-08-12 RX ORDER — METOPROLOL SUCCINATE 25 MG/1
TABLET, EXTENDED RELEASE ORAL
Qty: 30 TABLET | Refills: 0 | Status: SHIPPED | OUTPATIENT
Start: 2022-08-12 | End: 2022-08-26

## 2022-08-12 RX ORDER — FLUTICASONE PROPIONATE 50 MCG
SPRAY, SUSPENSION (ML) NASAL
Qty: 48 G | Refills: 0 | Status: SHIPPED | OUTPATIENT
Start: 2022-08-12 | End: 2022-08-26

## 2022-08-12 NOTE — TELEPHONE ENCOUNTER
Per last one month refill of birth control sent by Selin Martin, is due for follow up.    See patient's P[employer] message, is going to [place].    Metoprolol 25 mg XL is taken as 1/2 tablet once daily.   I see 90 tabs with no refills was sent 5/13/22.   Does she have a 90 day supply left?    BP Readings from Last 3 Encounters:   06/23/22 (!) 124/90   04/18/22 (!) 134/92   11/16/21 115/79     Attempted to call pharmacy to verify if there is a metoprolol refill available.   On hold with one caller ahead of me for 8 minutes, will try again later if needed.   [employer] message sent to patient to clarify when she is leaving and if she has refill of metoprolol left.    Stephany Harmon RN  [facility]

## 2022-08-12 NOTE — TELEPHONE ENCOUNTER
"See patient's response, she says she is not due to see provider as she \"saw her not too long ago\".    She states her metoprolol has no refills.    She says they told her the eye drops have no refills.   I see cromolyn eye drops were sent on 8/4/22, 10 mls with 10 refills.    Attempted to call [facility] again, on hold with 3 callers ahead of me now.    I routed message back to patient advising eye drops were sent, contact pharmacy directly (not automated call).    Requested Prescriptions   Pending Prescriptions Disp Refills     metoprolol succinate ER (TOPROL XL) 25 MG 24 hr tablet 90 tablet 0     Sig: TAKE ONE-HALF TABLET BY MOUTH ONCE DAILY       Beta-Blockers Protocol Failed - 8/12/2022 11:04 AM        Failed - Blood pressure under 140/90 in past 12 months     BP Readings from Last 3 Encounters:   06/23/22 (!) 124/90   04/18/22 (!) 134/92   11/16/21 115/79                 Passed - Patient is age 6 or older        Passed - Recent (12 mo) or future (30 days) visit within the authorizing provider's specialty     Patient has had an office visit with the authorizing provider or a provider within the authorizing providers department within the previous 12 mos or has a future within next 30 days. See \"Patient Info\" tab in inbasket, or \"Choose Columns\" in Meds & Orders section of the refill encounter.              Passed - Medication is active on med list           hydrOXYzine (ATARAX) 10 MG tablet 180 tablet 0     Sig: TAKE 1 TO 3 TABLETS BY MOUTH EVERY NIGHT AT BEDTIME       Antihistamines Protocol Passed - 8/12/2022 11:04 AM        Passed - Recent (12 mo) or future (30 days) visit within the authorizing provider's specialty     Patient has had an office visit with the authorizing provider or a provider within the authorizing providers department within the previous 12 mos or has a future within next 30 days. See \"Patient Info\" tab in inbasket, or \"Choose Columns\" in Meds & Orders section of the refill encounter.       " "       Passed - Patient is age 3 or older     Apply age and/or weight-based dosing for peds patients age 3 and older.    Forward request to provider for patients under the age of 3.          Passed - Medication is active on med list           fluticasone (FLONASE) 50 MCG/ACT nasal spray 48 g 0     Sig: SHAKE LIQUID AND USE 2 SPRAYS IN EACH NOSTRIL EVERY DAY       Nasal Allergy Protocol Passed - 8/12/2022 11:04 AM        Passed - Patient is age 12 or older        Passed - Recent (12 mo) or future (30 days) visit within the authorizing provider's specialty     Patient has had an office visit with the authorizing provider or a provider within the authorizing providers department within the previous 12 mos or has a future within next 30 days. See \"Patient Info\" tab in inbasket, or \"Choose Columns\" in Meds & Orders section of the refill encounter.              Passed - Medication is active on med list           norgestrel-ethinyl estradiol (CRYSELLE-28) 0.3-30 MG-MCG tablet 28 tablet 0     Sig: Take 1 tablet by mouth daily Due for follow up       Contraceptives Protocol Passed - 8/12/2022 11:04 AM        Passed - Patient is not a current smoker if age is 35 or older        Passed - Recent (12 mo) or future (30 days) visit within the authorizing provider's specialty     Patient has had an office visit with the authorizing provider or a provider within the authorizing providers department within the previous 12 mos or has a future within next 30 days. See \"Patient Info\" tab in inbasket, or \"Choose Columns\" in Meds & Orders section of the refill encounter.              Passed - Medication is active on med list        Passed - No active pregnancy on record        Passed - No positive pregnancy test in past 12 months           Birth control, fluticason, and hydroxyzine refills:  Prescription approved per [facility] Refill Protocol.    Metoprolol:  Routing refill request to provider for review/approval because:  Fails protocol due " to elevated BP    Stephany Harmon RN  [facility]

## 2022-08-26 ENCOUNTER — VIRTUAL VISIT (OUTPATIENT)
Dept: FAMILY MEDICINE | Facility: CLINIC | Age: 27
End: 2022-08-26
Payer: COMMERCIAL

## 2022-08-26 DIAGNOSIS — R10.31 RLQ ABDOMINAL PAIN: ICD-10-CM

## 2022-08-26 DIAGNOSIS — K21.00 GASTROESOPHAGEAL REFLUX DISEASE WITH ESOPHAGITIS WITHOUT HEMORRHAGE: ICD-10-CM

## 2022-08-26 DIAGNOSIS — J30.2 SEASONAL ALLERGIC RHINITIS, UNSPECIFIED TRIGGER: ICD-10-CM

## 2022-08-26 DIAGNOSIS — R10.2 PELVIC PAIN IN FEMALE: Primary | ICD-10-CM

## 2022-08-26 DIAGNOSIS — I10 BENIGN ESSENTIAL HYPERTENSION: ICD-10-CM

## 2022-08-26 DIAGNOSIS — F41.9 ANXIETY: ICD-10-CM

## 2022-08-26 DIAGNOSIS — N94.89 ENDOMETRIAL MASS: ICD-10-CM

## 2022-08-26 DIAGNOSIS — Z30.09 BIRTH CONTROL COUNSELING: ICD-10-CM

## 2022-08-26 PROCEDURE — 99214 OFFICE O/P EST MOD 30 MIN: CPT | Mod: 95 | Performed by: NURSE PRACTITIONER

## 2022-08-26 RX ORDER — HYDROXYZINE HYDROCHLORIDE 10 MG/1
TABLET, FILM COATED ORAL
Qty: 180 TABLET | Refills: 1 | Status: SHIPPED | OUTPATIENT
Start: 2022-08-26 | End: 2023-09-18

## 2022-08-26 RX ORDER — METOPROLOL SUCCINATE 25 MG/1
TABLET, EXTENDED RELEASE ORAL
Qty: 90 TABLET | Refills: 1 | Status: SHIPPED | OUTPATIENT
Start: 2022-08-26 | End: 2023-09-06

## 2022-08-26 RX ORDER — NORGESTREL-ETHINYL ESTRADIOL 0.3-0.03MG
1 TABLET ORAL DAILY
Qty: 90 TABLET | Refills: 1 | Status: SHIPPED | OUTPATIENT
Start: 2022-08-26 | End: 2023-04-18

## 2022-08-26 RX ORDER — FLUTICASONE PROPIONATE 50 MCG
SPRAY, SUSPENSION (ML) NASAL
Qty: 48 G | Refills: 1 | Status: SHIPPED | OUTPATIENT
Start: 2022-08-26 | End: 2023-09-18

## 2022-08-30 ENCOUNTER — ANCILLARY PROCEDURE (OUTPATIENT)
Dept: ULTRASOUND IMAGING | Facility: CLINIC | Age: 27
End: 2022-08-30
Attending: NURSE PRACTITIONER
Payer: COMMERCIAL

## 2022-08-30 DIAGNOSIS — R10.2 PELVIC PAIN IN FEMALE: ICD-10-CM

## 2022-08-30 DIAGNOSIS — R10.31 RLQ ABDOMINAL PAIN: ICD-10-CM

## 2022-08-30 PROCEDURE — 76830 TRANSVAGINAL US NON-OB: CPT | Mod: TC | Performed by: RADIOLOGY

## 2022-08-30 PROCEDURE — 76856 US EXAM PELVIC COMPLETE: CPT | Mod: TC | Performed by: RADIOLOGY

## 2022-08-31 ENCOUNTER — MYC MEDICAL ADVICE (OUTPATIENT)
Dept: FAMILY MEDICINE | Facility: CLINIC | Age: 27
End: 2022-08-31

## 2022-08-31 DIAGNOSIS — R10.9 FLANK PAIN: Primary | ICD-10-CM

## 2022-08-31 NOTE — RESULT ENCOUNTER NOTE
Scotty Jo,    Thank you for your recent office visit.    Here are your recent results.  Pretty normal ultrasound, only finding per radiology-    IMPRESSION:  1.  Trace amount of free fluid identified within the endometrial canal.       Feel free to contact me via ZoomInfot or call the clinic at [phone].    Sincerely,    SAHARA Paez, FNP-BC

## 2022-09-12 ENCOUNTER — LAB (OUTPATIENT)
Dept: LAB | Facility: CLINIC | Age: 27
End: 2022-09-12
Payer: COMMERCIAL

## 2022-09-12 DIAGNOSIS — R10.9 FLANK PAIN: ICD-10-CM

## 2022-09-12 DIAGNOSIS — R10.2 PELVIC PAIN IN FEMALE: ICD-10-CM

## 2022-09-12 DIAGNOSIS — R10.31 RLQ ABDOMINAL PAIN: ICD-10-CM

## 2022-09-12 LAB
ALBUMIN SERPL-MCNC: 4.2 G/DL (ref 3.4–5)
ALBUMIN UR-MCNC: NEGATIVE MG/DL
ANION GAP SERPL CALCULATED.3IONS-SCNC: 6 MMOL/L (ref 3–14)
APPEARANCE UR: CLEAR
BASOPHILS # BLD AUTO: 0 10E3/UL (ref 0–0.2)
BASOPHILS NFR BLD AUTO: 0 %
BILIRUB UR QL STRIP: NEGATIVE
BUN SERPL-MCNC: 10 MG/DL (ref 7–30)
CALCIUM SERPL-MCNC: 9.2 MG/DL (ref 8.5–10.1)
CHLORIDE BLD-SCNC: 110 MMOL/L (ref 94–109)
CO2 SERPL-SCNC: 23 MMOL/L (ref 20–32)
COLOR UR AUTO: YELLOW
CREAT SERPL-MCNC: 0.76 MG/DL (ref 0.52–1.04)
EOSINOPHIL # BLD AUTO: 0.2 10E3/UL (ref 0–0.7)
EOSINOPHIL NFR BLD AUTO: 2 %
ERYTHROCYTE [DISTWIDTH] IN BLOOD BY AUTOMATED COUNT: 13.2 % (ref 10–15)
GFR SERPL CREATININE-BSD FRML MDRD: >90 ML/MIN/1.73M2
GLUCOSE BLD-MCNC: 86 MG/DL (ref 70–99)
GLUCOSE UR STRIP-MCNC: NEGATIVE MG/DL
HCT VFR BLD AUTO: 40.4 % (ref 35–47)
HGB BLD-MCNC: 13.6 G/DL (ref 11.7–15.7)
HGB UR QL STRIP: NEGATIVE
IMM GRANULOCYTES # BLD: 0 10E3/UL
IMM GRANULOCYTES NFR BLD: 0 %
KETONES UR STRIP-MCNC: NEGATIVE MG/DL
LEUKOCYTE ESTERASE UR QL STRIP: ABNORMAL
LYMPHOCYTES # BLD AUTO: 3.1 10E3/UL (ref 0.8–5.3)
LYMPHOCYTES NFR BLD AUTO: 40 %
MCH RBC QN AUTO: 28.3 PG (ref 26.5–33)
MCHC RBC AUTO-ENTMCNC: 33.7 G/DL (ref 31.5–36.5)
MCV RBC AUTO: 84 FL (ref 78–100)
MONOCYTES # BLD AUTO: 0.6 10E3/UL (ref 0–1.3)
MONOCYTES NFR BLD AUTO: 8 %
NEUTROPHILS # BLD AUTO: 3.8 10E3/UL (ref 1.6–8.3)
NEUTROPHILS NFR BLD AUTO: 50 %
NITRATE UR QL: NEGATIVE
PH UR STRIP: 6 [PH] (ref 5–7)
PHOSPHATE SERPL-MCNC: 2.5 MG/DL (ref 2.5–4.5)
PLATELET # BLD AUTO: 352 10E3/UL (ref 150–450)
POTASSIUM BLD-SCNC: 4.3 MMOL/L (ref 3.4–5.3)
RBC # BLD AUTO: 4.8 10E6/UL (ref 3.8–5.2)
RBC #/AREA URNS AUTO: NORMAL /HPF
SODIUM SERPL-SCNC: 139 MMOL/L (ref 133–144)
SP GR UR STRIP: <=1.005 (ref 1–1.03)
UROBILINOGEN UR STRIP-ACNC: 0.2 E.U./DL
WBC # BLD AUTO: 7.6 10E3/UL (ref 4–11)
WBC #/AREA URNS AUTO: NORMAL /HPF

## 2022-09-12 PROCEDURE — 36415 COLL VENOUS BLD VENIPUNCTURE: CPT

## 2022-09-12 PROCEDURE — 85025 COMPLETE CBC W/AUTO DIFF WBC: CPT

## 2022-09-12 PROCEDURE — 81001 URINALYSIS AUTO W/SCOPE: CPT

## 2022-09-12 PROCEDURE — 80069 RENAL FUNCTION PANEL: CPT

## 2022-09-14 NOTE — RESULT ENCOUNTER NOTE
Scotty Jo,    Thank you for your recent office visit.    Here are your recent results.  Normal labs.     Feel free to contact me via MedioTrab[employer] or call the clinic at [phone].    Sincerely,    SAHARA Paez, FNP-BC

## 2022-09-18 ENCOUNTER — HEALTH MAINTENANCE LETTER (OUTPATIENT)
Age: 27
End: 2022-09-18

## 2022-10-20 ENCOUNTER — TRANSFERRED RECORDS (OUTPATIENT)
Dept: HEALTH INFORMATION MANAGEMENT | Facility: CLINIC | Age: 27
End: 2022-10-20

## 2023-03-02 ENCOUNTER — APPOINTMENT (OUTPATIENT)
Dept: URGENT CARE | Facility: CLINIC | Age: 28
End: 2023-03-02
Payer: COMMERCIAL

## 2023-03-03 ENCOUNTER — OFFICE VISIT (OUTPATIENT)
Dept: PEDIATRICS | Facility: CLINIC | Age: 28
End: 2023-03-03
Payer: COMMERCIAL

## 2023-03-03 ENCOUNTER — ANCILLARY PROCEDURE (OUTPATIENT)
Dept: CT IMAGING | Facility: CLINIC | Age: 28
End: 2023-03-03
Attending: FAMILY MEDICINE
Payer: COMMERCIAL

## 2023-03-03 ENCOUNTER — OFFICE VISIT (OUTPATIENT)
Dept: URGENT CARE | Facility: URGENT CARE | Age: 28
End: 2023-03-03
Payer: COMMERCIAL

## 2023-03-03 VITALS
RESPIRATION RATE: 14 BRPM | BODY MASS INDEX: 32.28 KG/M2 | DIASTOLIC BLOOD PRESSURE: 99 MMHG | HEART RATE: 78 BPM | TEMPERATURE: 98 F | SYSTOLIC BLOOD PRESSURE: 144 MMHG | WEIGHT: 200 LBS | OXYGEN SATURATION: 100 %

## 2023-03-03 VITALS
BODY MASS INDEX: 32.31 KG/M2 | WEIGHT: 200.2 LBS | TEMPERATURE: 99.4 F | OXYGEN SATURATION: 100 % | HEART RATE: 77 BPM | DIASTOLIC BLOOD PRESSURE: 93 MMHG | SYSTOLIC BLOOD PRESSURE: 143 MMHG

## 2023-03-03 DIAGNOSIS — N89.8 VAGINAL DISCHARGE: ICD-10-CM

## 2023-03-03 DIAGNOSIS — R10.30 LOWER ABDOMINAL PAIN: ICD-10-CM

## 2023-03-03 DIAGNOSIS — R10.84 ABDOMINAL PAIN, GENERALIZED: Primary | ICD-10-CM

## 2023-03-03 DIAGNOSIS — I10 BENIGN ESSENTIAL HYPERTENSION: ICD-10-CM

## 2023-03-03 DIAGNOSIS — R10.30 LOWER ABDOMINAL PAIN: Primary | ICD-10-CM

## 2023-03-03 DIAGNOSIS — K58.1 IRRITABLE BOWEL SYNDROME WITH CONSTIPATION: ICD-10-CM

## 2023-03-03 PROBLEM — F41.9 ANXIETY: Status: RESOLVED | Noted: 2018-06-25 | Resolved: 2023-03-03

## 2023-03-03 PROBLEM — R10.9 NONSPECIFIC ABDOMINAL PAIN: Status: ACTIVE | Noted: 2021-01-20

## 2023-03-03 PROBLEM — K44.9 DIAPHRAGMATIC HERNIA: Status: ACTIVE | Noted: 2021-12-29

## 2023-03-03 PROBLEM — K31.7 POLYP OF DUODENUM: Status: ACTIVE | Noted: 2021-10-29

## 2023-03-03 PROBLEM — Z87.11 HISTORY OF GASTRIC ULCER: Status: ACTIVE | Noted: 2023-03-03

## 2023-03-03 PROBLEM — R14.0 ABDOMINAL DISTENSION, GASEOUS: Status: ACTIVE | Noted: 2021-01-20

## 2023-03-03 PROBLEM — K21.9 GASTROESOPHAGEAL REFLUX DISEASE WITHOUT ESOPHAGITIS: Status: ACTIVE | Noted: 2023-03-03

## 2023-03-03 PROBLEM — R14.0 ABDOMINAL BLOATING: Status: ACTIVE | Noted: 2023-03-03

## 2023-03-03 LAB
ALBUMIN SERPL-MCNC: 4.4 G/DL (ref 3.4–5)
ALBUMIN UR-MCNC: NEGATIVE MG/DL
ALP SERPL-CCNC: 89 U/L (ref 40–150)
ALT SERPL W P-5'-P-CCNC: 83 U/L (ref 0–50)
ANION GAP SERPL CALCULATED.3IONS-SCNC: 4 MMOL/L (ref 3–14)
APPEARANCE UR: CLEAR
AST SERPL W P-5'-P-CCNC: 45 U/L (ref 0–45)
BACTERIA #/AREA URNS HPF: ABNORMAL /HPF
BILIRUB SERPL-MCNC: 0.7 MG/DL (ref 0.2–1.3)
BILIRUB UR QL STRIP: NEGATIVE
BUN SERPL-MCNC: 8 MG/DL (ref 7–30)
C TRACH DNA SPEC QL NAA+PROBE: NEGATIVE
CALCIUM SERPL-MCNC: 10.1 MG/DL (ref 8.5–10.1)
CHLORIDE BLD-SCNC: 110 MMOL/L (ref 94–109)
CLUE CELLS: ABNORMAL
CO2 SERPL-SCNC: 25 MMOL/L (ref 20–32)
COLOR UR AUTO: YELLOW
CREAT SERPL-MCNC: 0.69 MG/DL (ref 0.52–1.04)
ERYTHROCYTE [DISTWIDTH] IN BLOOD BY AUTOMATED COUNT: 13.7 % (ref 10–15)
GFR SERPL CREATININE-BSD FRML MDRD: >90 ML/MIN/1.73M2
GLUCOSE BLD-MCNC: 79 MG/DL (ref 70–99)
GLUCOSE UR STRIP-MCNC: NEGATIVE MG/DL
HCT VFR BLD AUTO: 43.7 % (ref 35–47)
HGB BLD-MCNC: 14.6 G/DL (ref 11.7–15.7)
HGB UR QL STRIP: ABNORMAL
KETONES UR STRIP-MCNC: NEGATIVE MG/DL
LEUKOCYTE ESTERASE UR QL STRIP: NEGATIVE
LIPASE SERPL-CCNC: 126 U/L (ref 73–393)
MCH RBC QN AUTO: 28.3 PG (ref 26.5–33)
MCHC RBC AUTO-ENTMCNC: 33.4 G/DL (ref 31.5–36.5)
MCV RBC AUTO: 85 FL (ref 78–100)
N GONORRHOEA DNA SPEC QL NAA+PROBE: NEGATIVE
NITRATE UR QL: NEGATIVE
PH UR STRIP: 7 [PH] (ref 5–7)
PLATELET # BLD AUTO: 384 10E3/UL (ref 150–450)
POTASSIUM BLD-SCNC: 3.9 MMOL/L (ref 3.4–5.3)
PROT SERPL-MCNC: 8.3 G/DL (ref 6.8–8.8)
RBC # BLD AUTO: 5.15 10E6/UL (ref 3.8–5.2)
RBC #/AREA URNS AUTO: ABNORMAL /HPF
SODIUM SERPL-SCNC: 139 MMOL/L (ref 133–144)
SP GR UR STRIP: 1.01 (ref 1–1.03)
SQUAMOUS #/AREA URNS AUTO: ABNORMAL /LPF
TRICHOMONAS, WET PREP: ABNORMAL
UROBILINOGEN UR STRIP-ACNC: 0.2 E.U./DL
WBC # BLD AUTO: 8.1 10E3/UL (ref 4–11)
WBC #/AREA URNS AUTO: ABNORMAL /HPF
WBC'S/HIGH POWER FIELD, WET PREP: ABNORMAL
YEAST, WET PREP: ABNORMAL

## 2023-03-03 PROCEDURE — 87591 N.GONORRHOEAE DNA AMP PROB: CPT | Performed by: NURSE PRACTITIONER

## 2023-03-03 PROCEDURE — 83690 ASSAY OF LIPASE: CPT

## 2023-03-03 PROCEDURE — 99215 OFFICE O/P EST HI 40 MIN: CPT | Performed by: FAMILY MEDICINE

## 2023-03-03 PROCEDURE — 80053 COMPREHEN METABOLIC PANEL: CPT

## 2023-03-03 PROCEDURE — 85027 COMPLETE CBC AUTOMATED: CPT

## 2023-03-03 PROCEDURE — 36415 COLL VENOUS BLD VENIPUNCTURE: CPT

## 2023-03-03 PROCEDURE — 87210 SMEAR WET MOUNT SALINE/INK: CPT | Performed by: NURSE PRACTITIONER

## 2023-03-03 PROCEDURE — 81001 URINALYSIS AUTO W/SCOPE: CPT | Performed by: NURSE PRACTITIONER

## 2023-03-03 PROCEDURE — 99207 REFERRAL TO ACUTE AND DIAGNOSTIC SERVICES: CPT | Performed by: NURSE PRACTITIONER

## 2023-03-03 PROCEDURE — 74177 CT ABD & PELVIS W/CONTRAST: CPT | Mod: GC | Performed by: RADIOLOGY

## 2023-03-03 PROCEDURE — 87491 CHLMYD TRACH DNA AMP PROBE: CPT | Performed by: NURSE PRACTITIONER

## 2023-03-03 RX ORDER — IOPAMIDOL 755 MG/ML
123 INJECTION, SOLUTION INTRAVASCULAR ONCE
Status: COMPLETED | OUTPATIENT
Start: 2023-03-03 | End: 2023-03-03

## 2023-03-03 RX ADMIN — IOPAMIDOL 123 ML: 755 INJECTION, SOLUTION INTRAVASCULAR at 12:54

## 2023-03-03 NOTE — PATIENT INSTRUCTIONS
Go directly to the acute and diagnostic service Center in [place].  Do not eat or drink on your way.

## 2023-03-03 NOTE — PROGRESS NOTES
Assessment & Plan     Lower abdominal pain that is suspicious for irritable bowel syndrome with constipation: longstanding waxing and waning abdominal pain that is debilitating. She has intermittent constipation (rabbit size poop) and today has quite severe lower abdominal pain with diffuse abdominal tenderness but normal CT and labs other than what appears to be significant gas and modest stool burden on the CT.   Given that she has had colonoscopy, endoscopy (does have Veloz's), and CT which are all normal and she has been previously seen by GI to rule out inflammatory bowel disorders, I think it's quite likely this is IBS and can be conservatively managed. I did give her information on this and she will start a high fiber diet, supplement fiber, use gas-x, ok to use stool softeners for constipation and follow-up with GI for further treatment options if daily fiber (which she has not yet tried) fails.   - CT Abdomen Pelvis w Contrast; Future  - CBC with platelets; Future  - Lipase; Future  - Comprehensive metabolic panel; Future  - Comprehensive metabolic panel  - Lipase  - CBC with platelets    45 minutes spent on the date of the encounter doing chart review, review of test results, interpretation of tests, patient visit, documentation and discussion with other provider(s)     Return in about 1 week (around 3/10/2023) for with PCP if your symptoms are not improving, sooner if worsening.    Stephany Wahl MD  [facility]    Monet Jo is a 27 year old, presenting for the following health issues: Abdominal pain and   Vaginal Problem      HPI     Abdominal Pain and Vaginal Symptoms  Onset/Duration: 1 week for the vaginal discharge, longer for abdominal pain coming and going but acutely worsening int he last couple of days keeping her from normal activities.    Vaginal:   Description:  Vaginal Discharge: white creamy   Itching (Pruritis): YES  Burning sensation:   No  Odor: No  Accompanying Signs & Symptoms:  Urinary symptoms: No  Abdominal pain: YES  Fever: No  History:   Sexually active: YES  New Partner: No  Possibility of Pregnancy:  No  Recent antibiotic use: No  Previous vaginitis issues: No  Precipitating or alleviating factors: None  Therapies tried and outcome: Ibuprofen and hot pack which helped a little.    Abdominal:   Longstanding constipation, has history of significant abdominal pain and bloating that comes and goes.  Has been seen by GI in the past, had colonoscopy (normal) with workup for inflammatory bowel disease. Also had EGD which showed Veloz's esophagus which she is treating. Also with known small hiatal hernia.   In the last several days, she has had significant low abdominal pain that is crampy and radiates throughout her abdomen. It's both sides, constant and worsening. She is having normal stools now, though has a history of constipation.   No black or bloody stools, no nausea or vomiting  Her period just started as well as vaginal discharge as above which was not found to be BV or yeast in clinic and GC/Chlam are pending though not very concerned for this as she has had a single partner for 2 years without STDs.   Patient reports hx of 2 masses removed from uterus, masses were tested with benign results.   She has had her appendix out and after that had colitis/cecitis requiring hospitalization which this was reminiscent of.     Review of Systems   See above.      Objective    BP (!) 144/99 (BP Location: Right arm, Patient Position: Sitting, Cuff Size: Adult Regular)   Pulse 78   Temp 98  F (36.7  C) (Oral)   Resp 14   Wt 90.7 kg (200 lb)   LMP 03/03/2023 (Exact Date)   SpO2 100%   BMI 32.28 kg/m    Body mass index is 32.28 kg/m .  Physical Exam   Overall well appearing, but does appear uncomfortable sitting in the chair.  Abdomen: diffusely tender with guarding in the entire lower abdomen. Milder but still uncomfortable on exam in the  upper.   Normal bowel sounds  No palpable mass. Appendectomy scar present.    Results for orders placed or performed in visit on 03/03/23   CT Abdomen Pelvis w Contrast     Status: None    Narrative    EXAMINATION: CT ABDOMEN PELVIS W CONTRAST, 3/3/2023 1:00 PM    TECHNIQUE:  Helical CT images from the lung bases through the  symphysis pubis were obtained with contrast.  Coronal reformatted  images were generated at a workstation for further assessment.    COMPARISON: Pelvic ultrasound 8/30/2022. CT abdomen and pelvis  1/6/2016.    HISTORY: significant diffuse lower and mid-abdominal pain with  guarding on exam. Does not have an appendix.; Lower abdominal pain    FINDINGS:    Devices: None.    Abdomen and pelvis:   Liver: Focal fatty deposition at the falciform ligament. Mild liver  parenchymal heterogeneity, likely related to timing of contrast bolus.  No suspicious liver lesions. No intrahepatic biliary dilation.    Gallbladder: Normal gallbladder. No cholelithiasis..    Spleen: Normal size.    Pancreas: No suspicious pancreatic lesions. The pancreatic duct is not  dilated.    Adrenal glands: No adrenal nodules.    Kidneys: No kidney masses. No hydronephrosis or obstructing renal  stones.    Bladder: Unremarkable.     Pelvic organs: Unremarkable.    Gastrointestinal tract: Small sliding-type hiatal hernia. No dilated  loops of bowel or bowel wall thickening. History of appendectomy.    Lymph nodes: No suspicious lymphadenopathy.    Peritoneum/mesentery: No free fluid. No free air..    Vessels: Patent major abdominal arterial vasculature. Portal, splenic  and superior mesenteric veins are patent.  No significant  atherosclerotic disease.    Heart and lung bases: No pleural effusion or pneumothorax.     Bones and soft tissues: No acute or suspicious osseous lesions. No  suspicious soft tissue findings.       Impression    IMPRESSION:   1. No acute findings in the visualized abdomen or pelvis.  2. Small sliding-type  hiatal hernia.     I have personally reviewed the examination and initial interpretation  and I agree with the findings.    KALANI KYLE MD         SYSTEM ID:  [id number]   Results for orders placed or performed in visit on 03/03/23   Comprehensive metabolic panel     Status: Abnormal   Result Value Ref Range    Sodium 139 133 - 144 mmol/L    Potassium 3.9 3.4 - 5.3 mmol/L    Chloride 110 (H) 94 - 109 mmol/L    Carbon Dioxide (CO2) 25 20 - 32 mmol/L    Anion Gap 4 3 - 14 mmol/L    Urea Nitrogen 8 7 - 30 mg/dL    Creatinine 0.69 0.52 - 1.04 mg/dL    Calcium 10.1 8.5 - 10.1 mg/dL    Glucose 79 70 - 99 mg/dL    Alkaline Phosphatase 89 40 - 150 U/L    AST 45 0 - 45 U/L    ALT 83 (H) 0 - 50 U/L    Protein Total 8.3 6.8 - 8.8 g/dL    Albumin 4.4 3.4 - 5.0 g/dL    Bilirubin Total 0.7 0.2 - 1.3 mg/dL    GFR Estimate >90 >60 mL/min/1.73m2   Lipase     Status: Normal   Result Value Ref Range    Lipase 126 73 - 393 U/L   CBC with platelets     Status: Normal   Result Value Ref Range    WBC Count 8.1 4.0 - 11.0 10e3/uL    RBC Count 5.15 3.80 - 5.20 10e6/uL    Hemoglobin 14.6 11.7 - 15.7 g/dL    Hematocrit 43.7 35.0 - 47.0 %    MCV 85 78 - 100 fL    MCH 28.3 26.5 - 33.0 pg    MCHC 33.4 31.5 - 36.5 g/dL    RDW 13.7 10.0 - 15.0 %    Platelet Count 384 150 - 450 10e3/uL   Results for orders placed or performed in visit on 03/03/23   UA macro with reflex to Microscopic and Culture - Clinc Collect     Status: Abnormal    Specimen: Urine, Clean Catch   Result Value Ref Range    Color Urine Yellow Colorless, Straw, Light Yellow, Yellow    Appearance Urine Clear Clear    Glucose Urine Negative Negative mg/dL    Bilirubin Urine Negative Negative    Ketones Urine Negative Negative mg/dL    Specific Gravity Urine 1.010 1.003 - 1.035    Blood Urine Small (A) Negative    pH Urine 7.0 5.0 - 7.0    Protein Albumin Urine Negative Negative mg/dL    Urobilinogen Urine 0.2 0.2, 1.0 E.U./dL    Nitrite Urine Negative Negative    Leukocyte Esterase  Urine Negative Negative   UA Microscopic with Reflex to Culture     Status: Abnormal   Result Value Ref Range    Bacteria Urine Few (A) None Seen /HPF    RBC Urine 2-5 (A) 0-2 /HPF /HPF    WBC Urine None Seen 0-5 /HPF /HPF    Squamous Epithelials Urine Few (A) None Seen /LPF    Narrative    Urine Culture not indicated   Wet prep - Clinic Collect     Status: Abnormal    Specimen: Vagina; Swab   Result Value Ref Range    Trichomonas Absent Absent    Yeast Absent Absent    Clue Cells Absent Absent    WBCs/high power field 2+ (A) None   NEISSERIA GONORRHOEA PCR     Status: Normal    Specimen: Vagina; Urine   Result Value Ref Range    Neisseria gonorrhoeae Negative Negative   CHLAMYDIA TRACHOMATIS PCR     Status: Normal    Specimen: Vagina; Urine   Result Value Ref Range    Chlamydia trachomatis Negative Negative

## 2023-03-03 NOTE — PROGRESS NOTES
Chief Complaint   Patient presents with     Back Pain     UTI     DISCHARGE OVER 2 WEEKS, NO PAIN WITH URINATION, STOMACH PAIN FEEL LIKE IT IS FULL OF FLUID, LOWER BACK PAIN, NOTICED SX 3 DAYS          ICD-10-CM    1. Abdominal pain, generalized  R10.84 Referral to Acute and Diagnostic Services (Day of diagnostic / First order acute)      2. Vaginal discharge  N89.8 UA macro with reflex to Microscopic and Culture - Clinc Collect     Wet prep - Clinic Collect     NEISSERIA GONORRHOEA PCR     CHLAMYDIA TRACHOMATIS PCR     NEISSERIA GONORRHOEA PCR     CHLAMYDIA TRACHOMATIS PCR     UA Microscopic with Reflex to Culture     Referral to Acute and Diagnostic Services (Day of diagnostic / First order acute)      3. Benign essential hypertension  I10       Given patient's history she needs a higher level of care with immediate advanced imaging and blood work-up.  Spoke with Dr. Wahl from the [facility] and they will be happy to see her there.  Patient is given instructions to go directly to the [facility] and not eat or drink anything on her way.      Results for orders placed or performed in visit on 03/03/23 (from the past 24 hour(s))   UA macro with reflex to Microscopic and Culture - Clinc Collect    Specimen: Urine, Clean Catch   Result Value Ref Range    Color Urine Yellow Colorless, Straw, Light Yellow, Yellow    Appearance Urine Clear Clear    Glucose Urine Negative Negative mg/dL    Bilirubin Urine Negative Negative    Ketones Urine Negative Negative mg/dL    Specific Gravity Urine 1.010 1.003 - 1.035    Blood Urine Small (A) Negative    pH Urine 7.0 5.0 - 7.0    Protein Albumin Urine Negative Negative mg/dL    Urobilinogen Urine 0.2 0.2, 1.0 E.U./dL    Nitrite Urine Negative Negative    Leukocyte Esterase Urine Negative Negative   Wet prep - Clinic Collect    Specimen: Vagina; Swab   Result Value Ref Range    Trichomonas Absent Absent    Yeast Absent Absent    Clue Cells Absent Absent    WBCs/high  power field 2+ (A) None   UA Microscopic with Reflex to Culture   Result Value Ref Range    Bacteria Urine Few (A) None Seen /HPF    RBC Urine 2-5 (A) 0-2 /HPF /HPF    WBC Urine None Seen 0-5 /HPF /HPF    Squamous Epithelials Urine Few (A) None Seen /LPF    Narrative    Urine Culture not indicated       Subjective     Deysi Stoner is an 27 year old female who presents to clinic today for vaginal discharge and irritation for a couple of weeks, some urinary urgency but no dysuria.  She denies concerns about sexually transmitted diseases and has been with the same partner for 2 and half years.  She also complains of abdominal pain and bloating that has been worsening over the last week.  She reports daily bowel movements and no issues with constipation.    Patient reports a history of 2 masses that have had to be removed from her abdomen previously.  They were noncancerous.      ROS: 10 point ROS neg other than the symptoms noted above in the HPI.       Objective    BP (!) 143/93 (BP Location: Left arm, Patient Position: Sitting, Cuff Size: Adult Regular)   Pulse 77   Temp 99.4  F (37.4  C) (Tympanic)   Wt 90.8 kg (200 lb 3.2 oz)   SpO2 100%   BMI 32.31 kg/m    Nurses notes and VS have been reviewed.    Physical Exam       GENERAL APPEARANCE: alert and mild distress     EYES: PERRL, EOMI, sclera non-icteric     HENT: oral exam benign, mucus membranes intact, without ulcers or lesions     NECK: no adenopathy or asymmetry, thyroid normal to palpation     RESP: lungs clear to auscultation - no rales, rhonchi or wheezes     CV: regular rates and rhythm, no murmurs, rubs, or gallop     ABDOMEN: Bowel sounds normal, abdomen is soft but she does have generalized tenderness which seems to be the worst in the lower quadrants     MS: extremities normal- no gross deformities noted; normal muscle tone.     SKIN: no suspicious lesions or rashes     NEURO: Normal strength and tone, mentation intact and speech normal      PSYCH: normal thought process; no significant mood disturbance    Patient Instructions   Go directly to the acute and diagnostic service Center in [place].  Do not eat or drink on your way.      SAHARA Blackburn, CNP  [facility] Provider    The use of Dragon/Talkwheel dictation services may have been used to construct the content in this note; any grammatical or spelling errors are non-intentional. Please contact the author of this note directly if you are in need of any clarification.

## 2023-03-14 DIAGNOSIS — M54.2 NECK PAIN: Primary | ICD-10-CM

## 2023-03-14 NOTE — PROGRESS NOTES
"    Subjective:    Deysi Stoner is a 27 year old female who presents today for follow-up regarding     Myofascial pain neck and upper back    Poor ergonomics at work    Mild kyphoscoliosis with compensatory cervical lordosis  The office ergonomic adjustments and physical therapy as well as use of a Thera cane advised.  Consider MedX.    PRIOR HISTORY from 6/23/2022:  She was seen for evaluation of low back and neck pain.  [facility] ED note dated 10/12/2021 records the following: \"Deysi Stoner is a 26 y.o. female with a history of hypertension who presents to the emergency department for evaluation of neck pain. The patient reports left sided neck pain that started 2 days ago. The patient initially thought it was a knot, but she states that her pain increased in severity after she tried to crack her neck. She endorses intermittent jolts of pain in her neck in addition to constant soreness and pain with movement of the neck. This pain is also associated with intermittent numbness and tingling in her left arm. The patient reports that she hasn't been able to sleep for the past 2 days secondary to pain and she had to leave work today because she could not bend her neck to use a computer. The patient denies any head trauma or fever and states that she has never experienced pain like this in the past. The patient endorses drinking socially and denies smoking or using other drugs.\"     She was neurologically intact that day.       C-spine x-ray showed reversal of normal cervical lordosis but no other abnormalities.       She was reporting paresthesias down in the left forearm.  No advanced imaging was ordered.  She was advised to use ibuprofen and Tylenol along with Robaxin and prednisone and follow-up as needed with the primary care doctor.  She was treated and released.     No other records or imaging is available for this patient regarding her neck or back.     Other records document treatment for " "COVID-19 pneumonia in the emergency department on 5/16/2022     She got a little better after her October emergency department visit and the left arm symptoms have gone away.  For over 6 months she has had \"a bump\" on the left side of her neck, pain into her upper lower quadrant on the left and some headaches on the left.  No mechanism of injury that she can think of.  No radiating arm pain numbness tingling or weakness.  Once in a while she will feel like she is dropping things more not finding the keys on the keyboard but that is not a regular occurrence.  She has no leg weakness or balance problems or trouble swallowing.    INTERIM HISTORY:    Patient calls indicate that she did all of the recommended interventions without improvement and wondered if she should have imaging.  A cervical MRI was ordered on 3/14/2023.    Deysi has learned some home exercises informally through some physical therapist at [facility] that she has spoken to.  She has modified her workstation.  She has intermittent left-sided headaches and intermittent left arm achiness and tingling but no true numbness or weakness.  We reviewed her MRI findings which are normal.      Reviewed past medical, surgical, and family history.               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  She is single but has a boyfriend.  No children.  She has a relatively sedentary life but does enjoy hiking and walking her dog named \"Yeyo\", and he weighs 10 pounds.    Objective:    IMAGING: Images and records reviewed.    MRI CERVICAL SPINE WITHOUT CONTRAST March 21, 2023        FINDINGS: Straightening of the normal cervical lordosis, which can be  positional. Sagittal alignment otherwise appears unremarkable. Normal  vertebral body heights. Bone marrow signal appears normal. No spinal  cord signal abnormality identified. The craniocervical junction  appears " unremarkable. The intervertebral disks are normal in height.  Minimal shallow bulging of the discs at a couple of levels in the mid  to lower cervical spine without disc herniation or mass effect. There  may be minimal scattered degenerative facet arthropathy. No bone  marrow edema or facet joint effusion identified. There is no  significant spinal canal or neural foraminal stenosis. The visualized  paraspinous soft tissues appear unremarkable.                                                                      IMPRESSION: Minimal degenerative change. Otherwise unremarkable.    See history above for C-spine x-ray report    PHYSICAL EXAMINATION:   --CONSTITUTIONAL: Vital signs as above. No acute distress. The patient is well nourished and well groomed.  Again deficient ergonomics in her seated workstation based on her pantomime.  --CERVICAL SPINE:  Range of motion full fluid and pain with. Negative Spurling's . Upper Quadrant palpation positive trigger points reproducing her left-sided headache in her left levator scapula and trapezius.    Assessment:    Deysi Stoner is a 27 year old y.o. female with     Left-sided myofascial pain with radiating symptoms into her left arm and left head    Neurologically intact    Normal cervical spine MRI    Plan:  We talked about stretching and using a Thera cane.  She is interested in the [facility] program so I will send her to [email or web address] near her home.  Sit/stand workstation ordered.  Modification of her seated workstation described.  Follow-up with me at the conclusion of her Med[facility] program.     Please note: Voice recognition software was used in this dictation.  It may therefore contain typographical errors.    Jone Baires MD

## 2023-03-21 ENCOUNTER — ANCILLARY PROCEDURE (OUTPATIENT)
Dept: MRI IMAGING | Facility: CLINIC | Age: 28
End: 2023-03-21
Attending: PREVENTIVE MEDICINE
Payer: COMMERCIAL

## 2023-03-21 ENCOUNTER — OFFICE VISIT (OUTPATIENT)
Dept: NEUROSURGERY | Facility: CLINIC | Age: 28
End: 2023-03-21
Payer: COMMERCIAL

## 2023-03-21 VITALS
WEIGHT: 200 LBS | HEART RATE: 88 BPM | BODY MASS INDEX: 32.28 KG/M2 | DIASTOLIC BLOOD PRESSURE: 87 MMHG | SYSTOLIC BLOOD PRESSURE: 140 MMHG

## 2023-03-21 DIAGNOSIS — M54.2 NECK PAIN: ICD-10-CM

## 2023-03-21 DIAGNOSIS — M79.18 MYOFASCIAL PAIN: Primary | ICD-10-CM

## 2023-03-21 DIAGNOSIS — R29.898 MUSCULAR DECONDITIONING: ICD-10-CM

## 2023-03-21 PROCEDURE — 72141 MRI NECK SPINE W/O DYE: CPT | Mod: TC | Performed by: RADIOLOGY

## 2023-03-21 PROCEDURE — 99213 OFFICE O/P EST LOW 20 MIN: CPT | Performed by: PREVENTIVE MEDICINE

## 2023-03-21 NOTE — PATIENT INSTRUCTIONS
Deysi I am glad to tell you that your cervical spine MRI is normal for a woman of your age.  There is nothing pinching on the spinal cord or nerves but you do have a myofascial trigger point which we demonstrated on exam today.  Set a timer on your computer to remind you to stretch and use your Thera cane every 20 to 30 minutes.  Work hard in the MedX program but and remember that you will be worse before you get better as you start this.  See the assessment and plan below for further details of our discussion today and I am looking forward to seeing you back here smiling and happy in 4 months.    Assessment:    Deysi Stoner is a 27 year old y.o. female with   Left-sided myofascial pain with radiating symptoms into her left arm and left head  Neurologically intact  Normal cervical spine MRI    Plan:  We talked about stretching and using a Thera cane.  She is interested in the MedX program so I will send her to 1 in [place] near her home.  Sit/stand workstation ordered.  Modification of her seated workstation described.  Follow-up with me at the conclusion of her MedX program.

## 2023-03-21 NOTE — LETTER
"    3/21/2023         RE: Deysi Stoner  [address] [facility] [place] [address]        Dear Colleague,    Thank you for referring your patient, Deysi Stoner, to the [facility] [place]. Please see a copy of my visit note below.        Subjective:    Deysi Stoner is a 27 year old female who presents today for follow-up regarding     Myofascial pain neck and upper back    Poor ergonomics at work    Mild kyphoscoliosis with compensatory cervical lordosis  The office ergonomic adjustments and physical therapy as well as use of a Thera cane advised.  Consider MedX.    PRIOR HISTORY from 6/23/2022:  She was seen for evaluation of low back and neck pain.  [facility] ED note dated 10/12/2021 records the following: \"Deysi Stoner is a 26 y.o. female with a history of hypertension who presents to the emergency department for evaluation of neck pain. The patient reports left sided neck pain that started 2 days ago. The patient initially thought it was a knot, but she states that her pain increased in severity after she tried to crack her neck. She endorses intermittent jolts of pain in her neck in addition to constant soreness and pain with movement of the neck. This pain is also associated with intermittent numbness and tingling in her left arm. The patient reports that she hasn't been able to sleep for the past 2 days secondary to pain and she had to leave work today because she could not bend her neck to use a computer. The patient denies any head trauma or fever and states that she has never experienced pain like this in the past. The patient endorses drinking socially and denies smoking or using other drugs.\"     She was neurologically intact that day.       C-spine x-ray showed reversal of normal cervical lordosis but no other abnormalities.       She was reporting paresthesias down in the left forearm.  No advanced imaging was ordered.  She was advised to " "use ibuprofen and Tylenol along with Robaxin and prednisone and follow-up as needed with the primary care doctor.  She was treated and released.     No other records or imaging is available for this patient regarding her neck or back.     Other records document treatment for COVID-19 pneumonia in the emergency department on 5/16/2022     She got a little better after her October emergency department visit and the left arm symptoms have gone away.  For over 6 months she has had \"a bump\" on the left side of her neck, pain into her upper lower quadrant on the left and some headaches on the left.  No mechanism of injury that she can think of.  No radiating arm pain numbness tingling or weakness.  Once in a while she will feel like she is dropping things more not finding the keys on the keyboard but that is not a regular occurrence.  She has no leg weakness or balance problems or trouble swallowing.    INTERIM HISTORY:    Patient calls indicate that she did all of the recommended interventions without improvement and wondered if she should have imaging.  A cervical MRI was ordered on 3/14/2023.    Deysi has learned some home exercises informally through some physical therapist at [place] that she has spoken to.  She has modified her workstation.  She has intermittent left-sided headaches and intermittent left arm achiness and tingling but no true numbness or weakness.  We reviewed her MRI findings which are normal.      Reviewed past medical, surgical, and family history.               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  She is single but has a boyfriend.  No children.  She has a relatively sedentary life but does enjoy hiking and walking her dog named \"Yeyo\", and he weighs 10 pounds.    Objective:    IMAGING: Images and records reviewed.    MRI CERVICAL SPINE WITHOUT CONTRAST March 21, 2023        FINDINGS: " Straightening of the normal cervical lordosis, which can be  positional. Sagittal alignment otherwise appears unremarkable. Normal  vertebral body heights. Bone marrow signal appears normal. No spinal  cord signal abnormality identified. The craniocervical junction  appears unremarkable. The intervertebral disks are normal in height.  Minimal shallow bulging of the discs at a couple of levels in the mid  to lower cervical spine without disc herniation or mass effect. There  may be minimal scattered degenerative facet arthropathy. No bone  marrow edema or facet joint effusion identified. There is no  significant spinal canal or neural foraminal stenosis. The visualized  paraspinous soft tissues appear unremarkable.                                                                      IMPRESSION: Minimal degenerative change. Otherwise unremarkable.    See history above for C-spine x-ray report    PHYSICAL EXAMINATION:   --CONSTITUTIONAL: Vital signs as above. No acute distress. The patient is well nourished and well groomed.  Again deficient ergonomics in her seated workstation based on her pantomime.  --CERVICAL SPINE:  Range of motion full fluid and pain with. Negative Spurling's . Upper Quadrant palpation positive trigger points reproducing her left-sided headache in her left levator scapula and trapezius.    Assessment:    Deysi Stoner is a 27 year old y.o. female with     Left-sided myofascial pain with radiating symptoms into her left arm and left head    Neurologically intact    Normal cervical spine MRI    Plan:  We talked about stretching and using a Thera cane.  She is interested in the MedX program so I will send her to 1 in [place] near her home.  Sit/stand workstation ordered.  Modification of her seated workstation described.  Follow-up with me at the conclusion of her MedX program.     Please note: Voice recognition software was used in this dictation.  It may therefore contain typographical  errors.    Jone Baires MD           Again, thank you for allowing me to participate in the care of your patient.        Sincerely,        Jone Baires MD

## 2023-03-21 NOTE — LETTER
March 21, 2023      Deysi Stoner  [address] [facility] [place] [address]              Dear Deysi,    This is to certify the medical necessity to have an ergonomic assessment of your seated workstation with an adjustable office chair that has adjustable armrests and also a sit/stand workstation.      Sincerely,      Jone Baires MD  Medical spine  Occupational medicine

## 2023-05-15 DIAGNOSIS — Z30.09 BIRTH CONTROL COUNSELING: ICD-10-CM

## 2023-05-15 RX ORDER — NORGESTREL-ETHINYL ESTRADIOL 0.3-0.03MG
1 TABLET ORAL DAILY
Qty: 30 TABLET | Refills: 0 | Status: SHIPPED | OUTPATIENT
Start: 2023-05-15 | End: 2023-06-06

## 2023-05-31 ENCOUNTER — APPOINTMENT (OUTPATIENT)
Dept: URBAN - METROPOLITAN AREA CLINIC 252 | Age: 28
Setting detail: DERMATOLOGY
End: 2023-06-05

## 2023-05-31 VITALS — WEIGHT: 200 LBS | HEIGHT: 66 IN

## 2023-05-31 DIAGNOSIS — L71.8 OTHER ROSACEA: ICD-10-CM

## 2023-05-31 DIAGNOSIS — L70.0 ACNE VULGARIS: ICD-10-CM

## 2023-05-31 PROCEDURE — 99214 OFFICE O/P EST MOD 30 MIN: CPT

## 2023-05-31 PROCEDURE — OTHER PRESCRIPTION: OTHER

## 2023-05-31 PROCEDURE — OTHER COUNSELING: OTHER

## 2023-05-31 RX ORDER — TRETIONIN 0.25 MG/G
CREAM TOPICAL QHS
Qty: 45 | Refills: 2 | Status: ERX | COMMUNITY
Start: 2023-05-31

## 2023-05-31 RX ORDER — SPIRONOLACTONE 50 MG/1
50MG TABLET, FILM COATED ORAL TWICE PER DAY
Qty: 180 | Refills: 0 | Status: ERX | COMMUNITY
Start: 2023-05-31

## 2023-05-31 RX ORDER — CLINDAMYCIN PHOSPHATE 10 MG/G
GEL TOPICAL BID
Qty: 60 | Refills: 2 | Status: ERX | COMMUNITY
Start: 2023-05-31

## 2023-05-31 ASSESSMENT — LOCATION ZONE DERM: LOCATION ZONE: FACE

## 2023-05-31 ASSESSMENT — LOCATION SIMPLE DESCRIPTION DERM
LOCATION SIMPLE: RIGHT CHEEK
LOCATION SIMPLE: LEFT CHEEK

## 2023-05-31 ASSESSMENT — LOCATION DETAILED DESCRIPTION DERM
LOCATION DETAILED: LEFT CENTRAL MALAR CHEEK
LOCATION DETAILED: RIGHT CENTRAL MALAR CHEEK

## 2023-05-31 NOTE — PROCEDURE: COUNSELING
Tetracycline Counseling: Patient counseled regarding possible photosensitivity and increased risk for sunburn.  Patient instructed to avoid sunlight, if possible.  When exposed to sunlight, patients should wear protective clothing, sunglasses, and sunscreen.  The patient was instructed to call the office immediately if the following severe adverse effects occur:  hearing changes, easy bruising/bleeding, severe headache, or vision changes.  The patient verbalized understanding of the proper use and possible adverse effects of tetracycline.  All of the patient's questions and concerns were addressed. Patient understands to avoid pregnancy while on therapy due to potential birth defects.
Aklief Pregnancy And Lactation Text: It is unknown if this medication is safe to use during pregnancy.  It is unknown if this medication is excreted in breast milk.  Breastfeeding women should use the topical cream on the smallest area of the skin for the shortest time needed while breastfeeding.  Do not apply to nipple and areola.
Include Pregnancy/Lactation Warning?: No
Azithromycin Counseling:  I discussed with the patient the risks of azithromycin including but not limited to GI upset, allergic reaction, drug rash, diarrhea, and yeast infections.
Bactrim Pregnancy And Lactation Text: This medication is Pregnancy Category D and is known to cause fetal risk.  It is also excreted in breast milk.
Winlevi Counseling:  I discussed with the patient the risks of topical clascoterone including but not limited to erythema, scaling, itching, and stinging. Patient voiced their understanding.
Spironolactone Counseling: Patient advised regarding risks of diarrhea, abdominal pain, hyperkalemia, birth defects (for female patients), liver toxicity and renal toxicity. The patient may need blood work to monitor liver and kidney function and potassium levels while on therapy. The patient verbalized understanding of the proper use and possible adverse effects of spironolactone.  All of the patient's questions and concerns were addressed.
Patient Specific Counseling (Will Not Stick From Patient To Patient): - Begin spironolactone 50mg. \\n- Start by taking 1 tablet once every morning for 1 weeks, and if well tolerated, increase to 1 tablet twice per day.\\n- Start Clindamycin 1% gel QAM.\\n- Start tretinoin 0.025% cream QHS as tolerated. \\n- Briefly disc isotretinoin, as Pt inquired about it. Rec Pt try other things first, as Pt has not tried any rx topicals or pills and acne is not severe.\\n- Once acne is under better control, discussed pursuing microneedling for scarring
Topical Retinoid counseling:  Patient advised to apply a pea-sized amount only at bedtime and wait 30 minutes after washing their face before applying.  If too drying, patient may add a non-comedogenic moisturizer. The patient verbalized understanding of the proper use and possible adverse effects of retinoids.  All of the patient's questions and concerns were addressed.
Isotretinoin Counseling: Patient should get monthly blood tests, not donate blood, not drive at night if vision affected, not share medication, and not undergo elective surgery for 6 months after tx completed. Side effects reviewed, pt to contact office should one occur.
Azelaic Acid Counseling: Patient counseled that medicine may cause skin irritation and to avoid applying near the eyes.  In the event of skin irritation, the patient was advised to reduce the amount of the drug applied or use it less frequently.   The patient verbalized understanding of the proper use and possible adverse effects of azelaic acid.  All of the patient's questions and concerns were addressed.
High Dose Vitamin A Pregnancy And Lactation Text: High dose vitamin A therapy is contraindicated during pregnancy and breast feeding.
Topical Clindamycin Pregnancy And Lactation Text: This medication is Pregnancy Category B and is considered safe during pregnancy. It is unknown if it is excreted in breast milk.
Dapsone Pregnancy And Lactation Text: This medication is Pregnancy Category C and is not considered safe during pregnancy or breast feeding.
Benzoyl Peroxide Pregnancy And Lactation Text: This medication is Pregnancy Category C. It is unknown if benzoyl peroxide is excreted in breast milk.
Topical Retinoid Pregnancy And Lactation Text: This medication is Pregnancy Category C. It is unknown if this medication is excreted in breast milk.
High Dose Vitamin A Counseling: Side effects reviewed, pt to contact office should one occur.
Topical Sulfur Applications Counseling: Topical Sulfur Counseling: Patient counseled that this medication may cause skin irritation or allergic reactions.  In the event of skin irritation, the patient was advised to reduce the amount of the drug applied or use it less frequently.   The patient verbalized understanding of the proper use and possible adverse effects of topical sulfur application.  All of the patient's questions and concerns were addressed.
Tazorac Pregnancy And Lactation Text: This medication is not safe during pregnancy. It is unknown if this medication is excreted in breast milk.
Doxycycline Counseling:  Patient counseled regarding possible photosensitivity and increased risk for sunburn.  Patient instructed to avoid sunlight, if possible.  When exposed to sunlight, patients should wear protective clothing, sunglasses, and sunscreen.  The patient was instructed to call the office immediately if the following severe adverse effects occur:  hearing changes, easy bruising/bleeding, severe headache, or vision changes.  The patient verbalized understanding of the proper use and possible adverse effects of doxycycline.  All of the patient's questions and concerns were addressed.
Topical Sulfur Applications Pregnancy And Lactation Text: This medication is Pregnancy Category C and has an unknown safety profile during pregnancy. It is unknown if this topical medication is excreted in breast milk.
Spironolactone Pregnancy And Lactation Text: This medication can cause feminization of the male fetus and should be avoided during pregnancy. The active metabolite is also found in breast milk.
Birth Control Pills Counseling: Birth Control Pill Counseling: I discussed with the patient the potential side effects of OCPs including but not limited to increased risk of stroke, heart attack, thrombophlebitis, deep venous thrombosis, hepatic adenomas, breast changes, GI upset, headaches, and depression.  The patient verbalized understanding of the proper use and possible adverse effects of OCPs. All of the patient's questions and concerns were addressed.
Erythromycin Pregnancy And Lactation Text: This medication is Pregnancy Category B and is considered safe during pregnancy. It is also excreted in breast milk.
Tazorac Counseling:  Patient advised that medication is irritating and drying.  Patient may need to apply sparingly and wash off after an hour before eventually leaving it on overnight.  The patient verbalized understanding of the proper use and possible adverse effects of tazorac.  All of the patient's questions and concerns were addressed.
Birth Control Pills Pregnancy And Lactation Text: This medication should be avoided if pregnant and for the first 30 days post-partum.
Benzoyl Peroxide Counseling: Patient counseled that medicine may cause skin irritation and bleach clothing.  In the event of skin irritation, the patient was advised to reduce the amount of the drug applied or use it less frequently.   The patient verbalized understanding of the proper use and possible adverse effects of benzoyl peroxide.  All of the patient's questions and concerns were addressed.
Detail Level: Zone
Detail Level: Simple
Erythromycin Counseling:  I discussed with the patient the risks of erythromycin including but not limited to GI upset, allergic reaction, drug rash, diarrhea, increase in liver enzymes, and yeast infections.
Doxycycline Pregnancy And Lactation Text: This medication is Pregnancy Category D and not consider safe during pregnancy. It is also excreted in breast milk but is considered safe for shorter treatment courses.
Minocycline Pregnancy And Lactation Text: This medication is Pregnancy Category D and not consider safe during pregnancy. It is also excreted in breast milk.
Topical Clindamycin Counseling: Patient counseled that this medication may cause skin irritation or allergic reactions.  In the event of skin irritation, the patient was advised to reduce the amount of the drug applied or use it less frequently.   The patient verbalized understanding of the proper use and possible adverse effects of clindamycin.  All of the patient's questions and concerns were addressed.
Bactrim Counseling:  I discussed with the patient the risks of sulfa antibiotics including but not limited to GI upset, allergic reaction, drug rash, diarrhea, dizziness, photosensitivity, and yeast infections.  Rarely, more serious reactions can occur including but not limited to aplastic anemia, agranulocytosis, methemoglobinemia, blood dyscrasias, liver or kidney failure, lung infiltrates or desquamative/blistering drug rashes.
Winlevi Pregnancy And Lactation Text: This medication is considered safe during pregnancy and breastfeeding.
Dapsone Counseling: I discussed with the patient the risks of dapsone including but not limited to hemolytic anemia, agranulocytosis, rashes, methemoglobinemia, kidney failure, peripheral neuropathy, headaches, GI upset, and liver toxicity.  Patients who start dapsone require monitoring including baseline LFTs and weekly CBCs for the first month, then every month thereafter.  The patient verbalized understanding of the proper use and possible adverse effects of dapsone.  All of the patient's questions and concerns were addressed.
Azithromycin Pregnancy And Lactation Text: This medication is considered safe during pregnancy and is also secreted in breast milk.
Aklief counseling:  Patient advised to apply a pea-sized amount only at bedtime and wait 30 minutes after washing their face before applying.  If too drying, patient may add a non-comedogenic moisturizer.  The most commonly reported side effects including irritation, redness, scaling, dryness, stinging, burning, itching, and increased risk of sunburn.  The patient verbalized understanding of the proper use and possible adverse effects of retinoids.  All of the patient's questions and concerns were addressed.
Minocycline Counseling: Patient advised regarding possible photosensitivity and discoloration of the teeth, skin, lips, tongue and gums.  Patient instructed to avoid sunlight, if possible.  When exposed to sunlight, patients should wear protective clothing, sunglasses, and sunscreen.  The patient was instructed to call the office immediately if the following severe adverse effects occur:  hearing changes, easy bruising/bleeding, severe headache, or vision changes.  The patient verbalized understanding of the proper use and possible adverse effects of minocycline.  All of the patient's questions and concerns were addressed.
Azelaic Acid Pregnancy And Lactation Text: This medication is considered safe during pregnancy and breast feeding.
Sarecycline Counseling: Patient advised regarding possible photosensitivity and discoloration of the teeth, skin, lips, tongue and gums.  Patient instructed to avoid sunlight, if possible.  When exposed to sunlight, patients should wear protective clothing, sunglasses, and sunscreen.  The patient was instructed to call the office immediately if the following severe adverse effects occur:  hearing changes, easy bruising/bleeding, severe headache, or vision changes.  The patient verbalized understanding of the proper use and possible adverse effects of sarecycline.  All of the patient's questions and concerns were addressed.
Isotretinoin Pregnancy And Lactation Text: This medication is Pregnancy Category X and is considered extremely dangerous during pregnancy. It is unknown if it is excreted in breast milk.

## 2023-07-25 NOTE — PATIENT INSTRUCTIONS
Allergy and Immunology  New patient note    Chief Complaint   Patient presents with   • Office Visit     Consult-seasonal allergies  Difficulty breathing at night with fatigue failed spirometry        HPI:  Yair Valles is a 41 year old male with past medical history of obstructive sleep apnea presenting for evaluation of possible environmental allergies.    Patient with mild sleep apnea, CPAP initially helped but now still feels difficulty breathing through his nose.   Saw an outside pulmonologist Dr. Nickerson at [facility], was unable to perform PFT. Peripheral eosinophils on labs elevated to 139 per outside records. Patient considered for potential Nucala or Dupixent, although he denies formal diagnosis of asthma in past. Was using Symbicort with recent COVID infection in February, does not need it any further. Patient denies any cough, wheeze, chest tightness, might have mild shortness of breath if he bends down quickly, otherwise reports more of his difficulty breathing is through his nose that always \"feels blocked.\" Reports CT sinus just showed inflammation in the sinuses.     Patient is a , denies any smoke inhalation needing hospitalization in the past but is exposed to various fumes quite frequently.     History of ED visits for breathing exacerbations: None.  History of hospitalizations for breathing exacerbations: None.  History of intubations for severe breathing exacerbations: None.   Last influenza vaccination: Fall of 2022.  History of GERD: None.    Previous medications tried: Symbicort, Albuterol.    Rhinitis:  Symptoms present for years.  Patient denies prior allergy testing.   Patient denies prior allergen immunotherapy.  Typical symptoms include sneezing, rhinorrhea, nasal congestion, post nasal drip, throat clearing, sinus pressure, itchy eyes, watery eyes, itchy ears, decreased sense of smell and snoring. Patient denies history of itchy nose, itchy throat, nasal polyps and deviated  Was very nice to meet you today Deysi.  I think the main issue here is myofascial pain rather than malalignment of the spine or any pinched nerves.  You should do well with some conservative care as described below.  See the assessment and plan for further details of our discussion today.  Feel free to come back and see me if you not happy in a month or so.       septum.   Symptoms are present year round.  History of GERD: None.   Allergic triggers for symptoms: none.  Non-allergic triggers for symptoms: rainy days and none.  Vasomotor triggers for symptoms: rapid temperature change.  Episodes of sinusitis: 2-3 times per year.  Prior sinus surgery: None.  History of pneumonia: None.  History of otitis media: No recurrent infections.    Medications tried:   -- Allegra, Claritin  -- Sudafed  -- Afrin  -- Nasonex    Environmental exposures:  Age of home: 80 years.   Type of home: house  Type of heating/cooling system in home: central air  Pets: Yes, dog and cat  Pets allowed in bedroom: Yes  Smoking exposure: No   Rdcy-zq-zpri carpet: Yes  Mold/mildew damage: Yes    Use of humidifier: Yes   Use of de-humidifier: Yes  Use of feather/down pillows, blankets, or jackets: No   Use of dust mite covers: No  Cockroaches or mice exposure: No      Patient denies any history of food allergy, eczema, medication allergy, recurrent urticaria or angioedema, latex allergy, or stinging insect allergy.    ALLERGIES:  No Known Allergies     No current outpatient medications on file.     No current facility-administered medications for this visit.       HISTORY:  History reviewed. No pertinent past medical history.    History reviewed. No pertinent surgical history.    Social History     Socioeconomic History   • Marital status: Unknown     Spouse name: Not on file   • Number of children: Not on file   • Years of education: Not on file   • Highest education level: Not on file   Occupational History   • Not on file   Tobacco Use   • Smoking status: Not on file   • Smokeless tobacco: Not on file   Substance and Sexual Activity   • Alcohol use: Not on file   • Drug use: Not on file   • Sexual activity: Not on file   Other Topics Concern   • Not on file   Social History Narrative   • Not on file     Social Determinants of Health     Financial Resource Strain: Not on file   Food Insecurity: Not on file    Transportation Needs: Not on file   Physical Activity: Not on file   Stress: Not on file   Social Connections: Not on file   Intimate Partner Violence: Not on file       Family History   Problem Relation Age of Onset   • Eczema Mother    • Allergic Rhinitis Mother    • Allergic Rhinitis Father    • Allergic Rhinitis Sister    • Allergic Rhinitis Brother    • Allergic Rhinitis Daughter    • Allergic Rhinitis Son    • Angioedema Neg Hx    • Asthma Neg Hx    • Immunodeficiency Neg Hx    • Urticaria Neg Hx         ROS:  Constitutional Symptoms: No fevers, chills, or unexpected weight loss.  ENT: see HPI  Respiratory: No shortness of breath, cough, or wheeze.  Cardiovascular: No edema or arrhythmias.  Gastrointestinal: No abdominal pain, diarrhea, or constipation.  Musculoskeletal: No decreased range of motion, joint swelling or pain.  Integumentary: No rashes.  Neurological: No numbness or tingling.  Psychiatric: No recent mood changes or history of anxiety.  Allergic/Immunologic: see HPI.      PHYSICAL EXAM:  Vitals:   Visit Vitals  /88 (BP Location: RUE - Right upper extremity, Patient Position: Sitting, Cuff Size: Regular)   Pulse 73   Resp 20   SpO2 98%      Constitutional: vitals as above, no obvious deformities  Eyes: pupils equal, round, and reflective to light bilaterally, no conjunctival injection, no swelling or erythema of eyelids, no allergic shiners  Ears: tympanic membranes reflective to light bilaterally, no erythema, no bulging  Nose: inferior nasal turbinates erythematous, 1-2+ edematous bilaterally, no transverse nasal crease, no polyps visible  Oropharynx: oral mucous membranes moist, no cobblestoning, no edema  Lymphatics: no submandibular or supraclavicular lymphadenopathy  Respiratory: No respiratory distress, good aeration bilaterally, no prolonged expiratory phase, no wheezes  Cardiovascular: Regular rate and rhythm, no edema  Musculoskeletal: no abnormal gait, no clubbing of nails  Skin:  warm, no urticaria prior to skin testing  Psychiatric: appropriate judgement, mood, and affect    DIAGNOSTIC:  Percutaneous skin testing 07/25/23: positive to only 1 mold  Skin Test Notes   Measured in millimeters     Allergy SkinTesting - Prick    ALLERGEN ROUTE REACTION WHEAL FLARE COMMENT   White, Raúl Prick Negative 0 0    Cedar, Red Prick Negative 0 0    Cocklebur Prick Negative 0 0    Eastern [place] Prick Negative 0 0    American Elm Prick Negative 0 0    Maple / Follett Prick Negative 0 0    Hackberry Prick Negative 0 0    American Hazelnut Prick Negative 0 0    [place], [place]ark Prick Negative 0 0    White [place] Prick Negative 0 0    ALLERGEN ROUTE REACTION WHEAL FLARE COMMENT   [place] Prick Negative 0 0    White / Eastern [place]mer Prick Negative 0 0    Eastern [place] Prick Negative 0 0    Black [place] Pollen Prick Negative 0 0    Black [place] Prick Negative 0 0    Birch Mix Prick Negative 0 0    Bran Grass Prick Negative 0 0    Grass 7 Prick Negative 0 0    Firebrush / Kochia Prick Negative 0 0    Dock-Sorrel Mix Prick Negative 0 0    ALLERGEN ROUTE REACTION WHEAL FLARE COMMENT   Ragweed Mix Prick Negative 0 0    Lamb's Quarter Prick Negative 0 0    Casper Elder Burweed Prick Negative 0 0    Mugwort, Common Prick Negative 0 0    Nettle Prick Negative 0 0    Pigweed / Redroot Prick Negative 0 0    English Plantain Prick Negative 0 0    Russian Thistle Prick Negative 0 0    Alternaria Alternata Prick Negative 0 0    Aspergillus Fumigatus Prick Negative 0 0    ALLERGEN ROUTE REACTION WHEAL FLARE COMMENT   Bipolaris Sorokiniana Prick Negative 0 0    Cladosporium Herbarum Prick Positive 4 0    Cladosporium Sphaerospermum Prick Negative 0 0    Drechslera Spicifera  Prick Negative 0 0    Penicillium Chrysogenum Prick Negative 0 0    Standard Dust Mite Mix Prick Negative 0 0    Dog Epithelia Prick Negative 0 0    Cat Hair Prick Negative 0 0    horse epithelia Prick Negative 0 0    mouse epithelia Prick  Negative 0 0    guinea pig Prick Negative 0 0    rabbit Prick Negative 0 0    2 cockroach mix Prick Negative 0 0    mixed feathers Prick Negative 0 0    ALLERGEN ROUTE REACTION WHEAL FLARE COMMENT   Positive (Histamine) Prick Positive 10 35    Negative Prick Negative 0 0        ASSESSMENT/PLAN:  Yair Valles is a 41 year old male with past medical history of obstructive sleep apnea with rhinoconjunctivitis not yet optimally controlled.    Rhinoconjunctivitis  (primary encounter diagnosis)  Patient's sinus and ocular symptoms likely due to non-allergic triggers (weather change, chemical irritants) given environmental skin testing only positive to 1 mold. Recommend starting the following medications while maintaining non-allergic trigger avoidance measures to see if symptoms improve. Will proceed with a humoral immunity evaluation if sinus infections continue. Discussed azelastine nasal spray versus oral tablet montelukast. Discussed that Singulair (montelukast) has potential of neuropsychiatric side effects such as mood changes, depression, sleep disturbance, or suicidal ideation and FDA now no longer recommending it for patients with mild symptoms, particularly those with allergic rhinitis. Patient to start with trial of azelastine first.   Plan: ALLERGY SKIN TESTS,ALLERGENS  -- Astelin (Azelastine) 1- 2 spray per nostril 1-2 times a day scheduled or as needed  -- if symptoms still not improved, call allergy clinic to discuss trial of Singulair (montelukast) tablet versus referral to ENT  -- avoidance measures as per AVS  -- nasal saline spray and/or irrigation as needed prior to nasal medications  -- nasal spray instructions as per AVS    Return in 3 month(s) or sooner as needed.    Patient to call in 1 month if no improvement.     Davon Greene MD  Allergy, Asthma, and Immunology

## 2023-07-30 ENCOUNTER — HEALTH MAINTENANCE LETTER (OUTPATIENT)
Age: 28
End: 2023-07-30

## 2023-08-01 ENCOUNTER — OFFICE VISIT (OUTPATIENT)
Dept: FAMILY MEDICINE | Facility: CLINIC | Age: 28
End: 2023-08-01
Payer: COMMERCIAL

## 2023-08-01 VITALS
SYSTOLIC BLOOD PRESSURE: 110 MMHG | DIASTOLIC BLOOD PRESSURE: 72 MMHG | TEMPERATURE: 98.8 F | WEIGHT: 203 LBS | OXYGEN SATURATION: 99 % | HEART RATE: 99 BPM | BODY MASS INDEX: 32.77 KG/M2

## 2023-08-01 DIAGNOSIS — M79.605 PAIN OF LEFT LOWER EXTREMITY: ICD-10-CM

## 2023-08-01 DIAGNOSIS — T88.7XXA MEDICATION SIDE EFFECTS: ICD-10-CM

## 2023-08-01 DIAGNOSIS — R14.0 BLOATING: ICD-10-CM

## 2023-08-01 DIAGNOSIS — R42 DIZZINESS: Primary | ICD-10-CM

## 2023-08-01 PROCEDURE — 99213 OFFICE O/P EST LOW 20 MIN: CPT | Performed by: PHYSICIAN ASSISTANT

## 2023-08-01 ASSESSMENT — ANXIETY QUESTIONNAIRES
1. FEELING NERVOUS, ANXIOUS, OR ON EDGE: NOT AT ALL
6. BECOMING EASILY ANNOYED OR IRRITABLE: NOT AT ALL
7. FEELING AFRAID AS IF SOMETHING AWFUL MIGHT HAPPEN: NOT AT ALL
GAD7 TOTAL SCORE: 0
4. TROUBLE RELAXING: NOT AT ALL
8. IF YOU CHECKED OFF ANY PROBLEMS, HOW DIFFICULT HAVE THESE MADE IT FOR YOU TO DO YOUR WORK, TAKE CARE OF THINGS AT HOME, OR GET ALONG WITH OTHER PEOPLE?: NOT DIFFICULT AT ALL
5. BEING SO RESTLESS THAT IT IS HARD TO SIT STILL: NOT AT ALL
2. NOT BEING ABLE TO STOP OR CONTROL WORRYING: NOT AT ALL
7. FEELING AFRAID AS IF SOMETHING AWFUL MIGHT HAPPEN: NOT AT ALL
IF YOU CHECKED OFF ANY PROBLEMS ON THIS QUESTIONNAIRE, HOW DIFFICULT HAVE THESE PROBLEMS MADE IT FOR YOU TO DO YOUR WORK, TAKE CARE OF THINGS AT HOME, OR GET ALONG WITH OTHER PEOPLE: NOT DIFFICULT AT ALL
3. WORRYING TOO MUCH ABOUT DIFFERENT THINGS: NOT AT ALL
GAD7 TOTAL SCORE: 0

## 2023-08-01 NOTE — PROGRESS NOTES
Assessment & Plan       ICD-10-CM    1. Dizziness  R42       2. Medication side effects  T88.7XXA       3. Pain of left lower extremity  M79.605       4. Bloating  R14.0           1,2) Dizziness has improved since stopping the spironolactone. She will contact her dermatologist regarding other acne treatments.     3) Most likely a strain/sprain. Symptoms have been improving. Follow up if symptoms fail to improve or worsen.     4) We discussed common causes of gas/bloating. She will try Gas-X every morning to see if that helps. May need to trial an elimination diet as well.            Return in about 1 month (around 9/1/2023), or if symptoms worsen or fail to improve.     NAZ [facility] JIMMY Jo is a 27 year old, presenting for the following health issues:  Dizziness        8/1/2023     9:25 AM   Additional Questions   Roomed by Chantale LANE   Accompanied by self         8/1/2023     9:25 AM   Patient Reported Additional Medications   Patient reports taking the following new medications No, has since quit the Spironlactone       History of Present Illness       Reason for visit:  Abdominal pain  Symptom onset:  More than a month  Symptoms include:  Has been told this is IBS, bloating  Symptom intensity:  Mild  Symptom progression:  Staying the same  Had these symptoms before:  Yes  Has tried/received treatment for these symptoms:  Yes  Prior treatment description:  Milk of magnesia, PT which has not helped much    She eats 0-1 servings of fruits and vegetables daily.She consumes 1 sweetened beverage(s) daily.She exercises with enough effort to increase her heart rate 10 to 19 minutes per day.  She exercises with enough effort to increase her heart rate 3 or less days per week.   She is taking medications regularly.     Left leg pain and intermittent numbness when sitting  x1 month  Calf and hamstring   No swelling or redness  Was much worse in [place] [facility]  sure what she did    Dizziness  Onset/Duration: started 1 week after starting Spironolactone, this has gotten better since stopping 1 week ago   Description:   Do you feel faint: YES  Does it feel like the surroundings (bed, room) are moving: No  Unsteady/off balance: YES  Have you passed out or fallen: No  Intensity: mild  Progression of Symptoms: improving  Accompanying Signs & Symptoms:  Heart palpitations or chest pain: elevated heart rate  Nausea, vomiting: No  Weakness or lack of coordination in arms or legs: No  Vision or speech changes: No  Numbness or tingling: YES- arms  Ringing in ears (Tinnitus): No  Hearing Loss: No  History:   Head trauma/concussion history: No  Previous similar symptoms: No  Recent bleeding history: No  Any new medications (BP?): YES  Precipitating factors:   Worse with activity: No  Worse with head movement: No  Alleviating factors:   Does staying in a fixed position give relief: no   Therapies tried and outcome: Stopped Medication which has helped    Using tretinoin   S/e with spironolactone       Review of Systems   Constitutional, GI, musculoskeletal, neuro, skin systems are negative, except as otherwise noted.      Objective    /72 (BP Location: Left arm, Patient Position: Sitting, Cuff Size: Adult Large)   Pulse 99   Temp 98.8  F (37.1  C) (Tympanic)   Wt 92.1 kg (203 lb)   SpO2 99%   BMI 32.77 kg/m    Body mass index is 32.77 kg/m .  Physical Exam   GENERAL: healthy, alert and no distress  CV: no peripheral edema   MS: no gross musculoskeletal defects noted, no edema  SKIN: no suspicious lesions or rashes, no erythema   NEURO: Normal strength and tone, mentation intact and speech normal  PSYCH: mentation appears normal, affect normal/bright

## 2023-08-09 DIAGNOSIS — Z30.09 BIRTH CONTROL COUNSELING: ICD-10-CM

## 2023-08-09 RX ORDER — NORGESTREL-ETHINYL ESTRADIOL 0.3-0.03MG
1 TABLET ORAL DAILY
Qty: 28 TABLET | Refills: 1 | Status: SHIPPED | OUTPATIENT
Start: 2023-08-09 | End: 2023-10-31

## 2023-09-06 DIAGNOSIS — I10 BENIGN ESSENTIAL HYPERTENSION: ICD-10-CM

## 2023-09-06 RX ORDER — METOPROLOL SUCCINATE 25 MG/1
TABLET, EXTENDED RELEASE ORAL
Qty: 90 TABLET | Refills: 0 | Status: SHIPPED | OUTPATIENT
Start: 2023-09-06 | End: 2024-02-05

## 2023-09-18 DIAGNOSIS — J30.2 SEASONAL ALLERGIC RHINITIS, UNSPECIFIED TRIGGER: ICD-10-CM

## 2023-09-18 DIAGNOSIS — F41.9 ANXIETY: ICD-10-CM

## 2023-09-18 DIAGNOSIS — H04.123 DRY EYES: ICD-10-CM

## 2023-09-18 RX ORDER — FLUTICASONE PROPIONATE 50 MCG
SPRAY, SUSPENSION (ML) NASAL
Qty: 48 G | Refills: 0 | Status: SHIPPED | OUTPATIENT
Start: 2023-09-18 | End: 2024-03-27

## 2023-09-18 RX ORDER — CROMOLYN SODIUM 40 MG/ML
SOLUTION/ DROPS OPHTHALMIC
Qty: 10 ML | Refills: 10 | Status: SHIPPED | OUTPATIENT
Start: 2023-09-18 | End: 2024-03-27

## 2023-09-18 RX ORDER — HYDROXYZINE HYDROCHLORIDE 10 MG/1
TABLET, FILM COATED ORAL
Qty: 180 TABLET | Refills: 0 | Status: SHIPPED | OUTPATIENT
Start: 2023-09-18 | End: 2024-03-22

## 2023-10-31 DIAGNOSIS — Z30.09 BIRTH CONTROL COUNSELING: ICD-10-CM

## 2023-10-31 RX ORDER — NORGESTREL-ETHINYL ESTRADIOL 0.3-0.03MG
1 TABLET ORAL DAILY
Qty: 28 TABLET | Refills: 1 | Status: SHIPPED | OUTPATIENT
Start: 2023-10-31 | End: 2023-11-29

## 2023-11-27 DIAGNOSIS — F41.9 ANXIETY: ICD-10-CM

## 2023-11-27 RX ORDER — HYDROXYZINE HYDROCHLORIDE 10 MG/1
TABLET, FILM COATED ORAL
Qty: 180 TABLET | Refills: 0 | OUTPATIENT
Start: 2023-11-27

## 2023-11-29 DIAGNOSIS — Z30.09 BIRTH CONTROL COUNSELING: ICD-10-CM

## 2023-11-29 RX ORDER — NORGESTREL-ETHINYL ESTRADIOL 0.3-0.03MG
1 TABLET ORAL DAILY
Qty: 28 TABLET | Refills: 1 | Status: SHIPPED | OUTPATIENT
Start: 2023-11-29 | End: 2024-02-05

## 2024-02-04 DIAGNOSIS — I10 BENIGN ESSENTIAL HYPERTENSION: ICD-10-CM

## 2024-02-04 DIAGNOSIS — Z30.09 BIRTH CONTROL COUNSELING: ICD-10-CM

## 2024-02-05 RX ORDER — METOPROLOL SUCCINATE 25 MG/1
TABLET, EXTENDED RELEASE ORAL
Qty: 90 TABLET | Refills: 0 | Status: SHIPPED | OUTPATIENT
Start: 2024-02-05 | End: 2024-03-27

## 2024-02-05 RX ORDER — NORGESTREL-ETHINYL ESTRADIOL 0.3-0.03MG
1 TABLET ORAL DAILY
Qty: 28 TABLET | Refills: 1 | Status: SHIPPED | OUTPATIENT
Start: 2024-02-05 | End: 2024-03-22

## 2024-03-05 ENCOUNTER — VIRTUAL VISIT (OUTPATIENT)
Dept: FAMILY MEDICINE | Facility: CLINIC | Age: 29
End: 2024-03-05
Payer: COMMERCIAL

## 2024-03-05 DIAGNOSIS — J01.90 ACUTE SINUSITIS WITH SYMPTOMS > 10 DAYS: Primary | ICD-10-CM

## 2024-03-05 PROCEDURE — 99441 PR PHYSICIAN TELEPHONE EVALUATION 5-10 MIN: CPT | Performed by: NURSE PRACTITIONER

## 2024-03-05 RX ORDER — CEFDINIR 300 MG/1
600 CAPSULE ORAL DAILY
Qty: 14 CAPSULE | Refills: 0 | Status: SHIPPED | OUTPATIENT
Start: 2024-03-05 | End: 2024-03-12

## 2024-03-05 NOTE — PROGRESS NOTES
Deysi is a 28 year old who is being evaluated via a billable telephone visit.      What phone number would you like to be contacted at? [phone]   How would you like to obtain your AVS? Milad  Originating Location (pt. Location): Home, work    Distant Location (provider location):  Off-site    3:39 PM - 3:48 PM     Assessment & Plan     Acute sinusitis with symptoms > 10 days  Acute, sinus symptoms for approximately a week and a half.  Significant congestion, postnasal drainage and runny nose accompanied by cough with some chest congestion.  Denies any wheezing, no fevers.  Has been doing Flonase nasal spray consistently and Mucinex without much improvement.  Patient is flying out of the country on Friday given length and severity of symptoms as well as leaving the country, will treat with course of antibiotics.  Patient has history of C. difficile in the past, recommend patient take probiotics or increase yogurt while on antibiotics ~ by at least 4 hours from scheduled antibiotics.  Encourage plenty of fluids, rest, elevating head of bed, use of a coolmist vaporizer, saline nasal flush such as [place] pot and Tylenol and/or ibuprofen as needed.  - cefdinir (OMNICEF) 300 MG capsule; Take 2 capsules (600 mg) by mouth daily for 7 days          See Patient Instructions    Subjective   Deysi is a 28 year old, presenting for the following health issues:  Sinus Problem    Sinus Problem        ENT Symptoms             Symptoms: cc Present Absent Comment   Fever/Chills   x    Fatigue  x     Muscle Aches   x    Eye Irritation   x    Sneezing  x     Nasal Jeffry/Drg  x     Sinus Pressure/Pain  x     Loss of smell   x    Dental pain   x    Sore Throat   x Did have a sore throat in the beginning   Swollen Glands   x    Ear Pain/Fullness   x    Cough  x     Wheeze   x    Chest Pain   x    Shortness of breath   x    Rash   x    Other   x Denies GI sx     Symptom duration:  1.5 weeks   Symptom severity:  moderate    Treatments tried:  Mucinex, IBU   Contacts:  0     COVID test negative a few days ago      Started with a sore throat  Nose constantly runny or stuffy   Is doing Flonase         Review of Systems  Constitutional, HEENT, cardiovascular, pulmonary, gi and gu systems are negative, except as otherwise noted.      Objective           Vitals:  No vitals were obtained today due to virtual visit.    Physical Exam   General: Alert and no distress //Respiratory: No audible wheeze, cough, or shortness of breath // Psychiatric:  Appropriate affect, tone, and pace of words      Diagnostic Test Results:  Labs reviewed in Epic  none        Phone call duration: 9 minutes  Signed Electronically by: Rosamaria Burroughs, DNP, APRN-CNP

## 2024-03-05 NOTE — PATIENT INSTRUCTIONS
Acute sinusitis with symptoms > 10 days  Acute, sinus symptoms for approximately a week and a half.  Significant congestion, postnasal drainage and runny nose accompanied by cough with some chest congestion.  Denies any wheezing, no fevers.  Has been doing Flonase nasal spray consistently and Mucinex without much improvement.  Patient is flying out of the country on Friday given length and severity of symptoms as well as leaving the country, will treat with course of antibiotics.  Patient has history of C. difficile in the past, recommend patient take probiotics or increase yogurt while on antibiotics ~ by at least 4 hours from scheduled antibiotics.  Encourage plenty of fluids, rest, elevating head of bed, use of a coolmist vaporizer, saline nasal flush such as Izzy pot and Tylenol and/or ibuprofen as needed.  - cefdinir (OMNICEF) 300 MG capsule; Take 2 capsules (600 mg) by mouth daily for 7 days      Acute Sinusitis: Care Instructions  Overview     Acute sinusitis is an inflammation of the mucous membranes inside the nose and sinuses. Sinuses are the hollow spaces in your skull around the eyes and nose. Acute sinusitis often follows a cold. Acute sinusitis causes thick, discolored mucus that drains from the nose or down the back of the throat. It also can cause pain and pressure in your head and face along with a stuffy or blocked nose.  In most cases, sinusitis gets better on its own in 1 to 2 weeks. But some mild symptoms may last for several weeks. Sometimes antibiotics are needed if there is a bacterial infection.  Follow-up care is a key part of your treatment and safety. Be sure to make and go to all appointments, and call your doctor if you are having problems. It's also a good idea to know your test results and keep a list of the medicines you take.  How can you care for yourself at home?  Use saline (saltwater) nasal washes. This can help keep your nasal passages open and wash out mucus and  allergens.  You can buy saline nose washes at a grocery store or drugstore. Follow the instructions on the package.  You can make your own at home. Add 1 teaspoon of non-iodized salt and 1 teaspoon of baking soda to 2 cups of distilled or boiled and cooled water. Fill a squeeze bottle or a nasal cleansing pot (such as a neti pot) with the nasal wash. Then put the tip into your nostril, and lean over the sink. With your mouth open, gently squirt the liquid. Repeat on the other side.  Try a decongestant nasal spray like oxymetazoline (Afrin). Do not use it for more than 3 days in a row. Using it for more than 3 days can make your congestion worse.  If needed, take an over-the-counter pain medicine, such as acetaminophen (Tylenol), ibuprofen (Advil, Motrin), or naproxen (Aleve). Read and follow all instructions on the label.  If the doctor prescribed antibiotics, take them as directed. Do not stop taking them just because you feel better. You need to take the full course of antibiotics.  Be careful when taking over-the-counter cold or flu medicines and Tylenol at the same time. Many of these medicines have acetaminophen, which is Tylenol. Read the labels to make sure that you are not taking more than the recommended dose. Too much acetaminophen (Tylenol) can be harmful.  Try a steroid nasal spray. It may help with your symptoms.  Breathe warm, moist air. You can use a steamy shower, a hot bath, or a sink filled with hot water. Avoid cold, dry air. Using a humidifier in your home may help. Follow the directions for cleaning the machine.  When should you call for help?   Call your doctor now or seek immediate medical care if:    You have new or worse swelling, redness, or pain in your face or around one or both of your eyes.     You have double vision or a change in your vision.     You have a high fever.     You have a severe headache and a stiff neck.     You have mental changes, such as feeling confused or much less  "alert.   Watch closely for changes in your health, and be sure to contact your doctor if:    You are not getting better as expected.   Where can you learn more?  Go to https://www.[employer].net/patiented  Enter I933 in the search box to learn more about \"Acute Sinusitis: Care Instructions.\"  Current as of: February 28, 2023               Content Version: 13.8    0157-5653 [employer].   Care instructions adapted under license by your healthcare professional. If you have questions about a medical condition or this instruction, always ask your healthcare professional. [employer] disclaims any warranty or liability for your use of this information.      "

## 2024-03-22 ENCOUNTER — MYC MEDICAL ADVICE (OUTPATIENT)
Dept: FAMILY MEDICINE | Facility: CLINIC | Age: 29
End: 2024-03-22
Payer: COMMERCIAL

## 2024-03-22 DIAGNOSIS — F41.9 ANXIETY: ICD-10-CM

## 2024-03-22 DIAGNOSIS — Z30.09 BIRTH CONTROL COUNSELING: ICD-10-CM

## 2024-03-22 RX ORDER — HYDROXYZINE HYDROCHLORIDE 10 MG/1
TABLET, FILM COATED ORAL
Qty: 60 TABLET | Refills: 0 | Status: SHIPPED | OUTPATIENT
Start: 2024-03-22 | End: 2024-03-27

## 2024-03-22 RX ORDER — NORGESTREL-ETHINYL ESTRADIOL 0.3-0.03MG
1 TABLET ORAL DAILY
Qty: 28 TABLET | Refills: 0 | Status: SHIPPED | OUTPATIENT
Start: 2024-03-22 | End: 2024-03-27

## 2024-03-22 NOTE — TELEPHONE ENCOUNTER
Future Appointments 3/22/2024 - 9/18/2024        Date Visit Type Length Department Provider     3/27/2024  8:00 AM OFFICE VISIT 30 min [facility] Selin Argueta NP    Location Instructions:     [facility] is located at [address] [address], one block east of [address] and just north of the [address]. Access the parking lot by turning east from [address] onto [address], then turning north on [address].                   Patient contacted and scheduled Virtual visit to discuss her concern for possible C Diff and medications.     I spoke with Jose at [facility]. They are filling Metoprolol succinate ER (TOPROL XL) 25 MG 24 hr tablet (takes 1/2 tablet daily on file.  Her insurance allows only 30 day supply or 15 tablets).     HydrOXYzine HCl (ATARAX) 10 MG tablets sent today for 60 tablets (patient takes 2 tablets at HS).     Pharmacy is unable to fill the norgestrel-ethinyl estradiol (CRYSELLE-28) 0.3-30 MG-MCG tablets until 3/25/24 (due to insurance). Patient has 2 pills left today, however wants to skip the placebo tablets to avoid a period while she is traveling.     [employer] message sent to patient informing her of the above information.     Khushi KAHNN  [facility]

## 2024-03-27 ENCOUNTER — ANCILLARY PROCEDURE (OUTPATIENT)
Dept: GENERAL RADIOLOGY | Facility: CLINIC | Age: 29
End: 2024-03-27
Attending: NURSE PRACTITIONER
Payer: COMMERCIAL

## 2024-03-27 ENCOUNTER — VIRTUAL VISIT (OUTPATIENT)
Dept: FAMILY MEDICINE | Facility: CLINIC | Age: 29
End: 2024-03-27
Payer: COMMERCIAL

## 2024-03-27 ENCOUNTER — LAB (OUTPATIENT)
Dept: LAB | Facility: CLINIC | Age: 29
End: 2024-03-27
Payer: COMMERCIAL

## 2024-03-27 DIAGNOSIS — R19.7 DIARRHEA OF PRESUMED INFECTIOUS ORIGIN: ICD-10-CM

## 2024-03-27 DIAGNOSIS — R05.2 SUBACUTE COUGH: ICD-10-CM

## 2024-03-27 DIAGNOSIS — Z30.09 BIRTH CONTROL COUNSELING: ICD-10-CM

## 2024-03-27 DIAGNOSIS — I10 BENIGN ESSENTIAL HYPERTENSION: ICD-10-CM

## 2024-03-27 DIAGNOSIS — Z09 FOLLOW-UP EXAM: Primary | ICD-10-CM

## 2024-03-27 DIAGNOSIS — Z12.4 CERVICAL CANCER SCREENING: ICD-10-CM

## 2024-03-27 DIAGNOSIS — K21.00 GASTROESOPHAGEAL REFLUX DISEASE WITH ESOPHAGITIS WITHOUT HEMORRHAGE: ICD-10-CM

## 2024-03-27 DIAGNOSIS — R79.89 ELEVATED LIVER FUNCTION TESTS: ICD-10-CM

## 2024-03-27 DIAGNOSIS — A04.72 C. DIFFICILE DIARRHEA: ICD-10-CM

## 2024-03-27 DIAGNOSIS — H04.123 DRY EYES: ICD-10-CM

## 2024-03-27 DIAGNOSIS — J30.2 SEASONAL ALLERGIC RHINITIS, UNSPECIFIED TRIGGER: ICD-10-CM

## 2024-03-27 DIAGNOSIS — F41.9 ANXIETY: ICD-10-CM

## 2024-03-27 DIAGNOSIS — Z13.220 SCREENING CHOLESTEROL LEVEL: ICD-10-CM

## 2024-03-27 PROCEDURE — 71046 X-RAY EXAM CHEST 2 VIEWS: CPT | Mod: TC | Performed by: RADIOLOGY

## 2024-03-27 PROCEDURE — 99441 PR PHYSICIAN TELEPHONE EVALUATION 5-10 MIN: CPT | Performed by: NURSE PRACTITIONER

## 2024-03-27 RX ORDER — NORGESTREL-ETHINYL ESTRADIOL 0.3-0.03MG
1 TABLET ORAL DAILY
Qty: 90 TABLET | Refills: 3 | Status: SHIPPED | OUTPATIENT
Start: 2024-03-27

## 2024-03-27 RX ORDER — METOPROLOL SUCCINATE 25 MG/1
TABLET, EXTENDED RELEASE ORAL
Qty: 90 TABLET | Refills: 3 | Status: SHIPPED | OUTPATIENT
Start: 2024-03-27

## 2024-03-27 RX ORDER — CROMOLYN SODIUM 40 MG/ML
SOLUTION/ DROPS OPHTHALMIC
Qty: 10 ML | Refills: 10 | Status: SHIPPED | OUTPATIENT
Start: 2024-03-27

## 2024-03-27 RX ORDER — HYDROXYZINE HYDROCHLORIDE 10 MG/1
TABLET, FILM COATED ORAL
Qty: 270 TABLET | Refills: 3 | Status: SHIPPED | OUTPATIENT
Start: 2024-03-27

## 2024-03-27 RX ORDER — FLUTICASONE PROPIONATE 50 MCG
SPRAY, SUSPENSION (ML) NASAL
Qty: 48 G | Refills: 3 | Status: SHIPPED | OUTPATIENT
Start: 2024-03-27

## 2024-03-27 NOTE — PROGRESS NOTES
Deysi is a 28 year old who is being evaluated via a billable telephone visit.    What phone number would you like to be contacted at? [phone]  How would you like to obtain your AVS? Milad  Originating Location (pt. Location): Home    Distant Location (provider location):  Off-site    Assessment & Plan     Follow-up exam      Benign essential hypertension    - metoprolol succinate ER (TOPROL XL) 25 MG 24 hr tablet; TAKE ONE-HALF TABLET BY MOUTH ONCE DAILY  - Renal panel (Alb, BUN, Ca, Cl, CO2, Creat, Gluc, Phos, K, Na); Future    Cervical cancer screening      Diarrhea of presumed infectious origin    - C. difficile Toxin B PCR with reflex to C. difficile Antigen and Toxins A/B EIA; Future  - TSH with free T4 reflex; Future    Dry eyes    - cromolyn (OPTICROM) 4 % ophthalmic solution; INSTILL 2 DROPS IN BOTH EYES FOUR TIMES DAILY    Seasonal allergic rhinitis, unspecified trigger    - cromolyn (OPTICROM) 4 % ophthalmic solution; INSTILL 2 DROPS IN BOTH EYES FOUR TIMES DAILY  - fluticasone (FLONASE) 50 MCG/ACT nasal spray; SHAKE LIQUID AND USE 2 SPRAYS IN EACH NOSTRIL EVERY DAY    Anxiety    - hydrOXYzine HCl (ATARAX) 10 MG tablet; TAKE 1 TO 3 TABLETS BY MOUTH EVERY NIGHT AT BEDTIME    Birth control counseling    - norgestrel-ethinyl estradiol (CRYSELLE-28) 0.3-30 MG-MCG tablet; Take 1 tablet by mouth daily    Subacute cough    - XR Chest 2 Views; Future    Gastroesophageal reflux disease with esophagitis without hemorrhage    - omeprazole (PRILOSEC) 20 MG DR capsule; TAKE 1 CAPSULE BY MOUTH TWICE DAILY 30 MINUTES TO 1 HOUR BEFORE A MEAL    Elevated liver function tests    - Hepatic panel (Albumin, ALT, AST, Bili, Alk Phos, TP); Future    Screening cholesterol level    - Lipid Profile (Chol, Trig, HDL, LDL calc); Future    C. difficile diarrhea      Review of external notes as documented elsewhere in note  Ordering of each unique test  Prescription drug management  30 minutes spent by me on the date of the  encounter doing chart review, history and exam, documentation and further activities per the note        See Patient Instructions    Subjective   Deysi is a 28 year old, presenting for the following health issues:  c-diff and Recheck Medication        3/27/2024     7:05 AM   Additional Questions   Roomed by Candie   Accompanied by Self       Already sick before going to [place]; on 8th had phone appointment- started abx 3 days before she left for [place]- developed diarrhea and cramps; hx cdiff   ? Pneumonia. Respiratory infection for 1.5 months minus 1 week while on antibiotics.    No fevers  Stool test  Chest xray  No smoke  Discussed call and make lab appointment AND CXR appointment  Imodium after stool sample    Needing refills of medications, meds are working well for her. Aware of risks of birth control and signs/symptoms. Monitor BP < 140/80.    History of Present Illness       Reason for visit:  Loose stools  Symptom onset:  3-4 weeks ago  Symptoms include:  Loose stools, cramping  Symptom intensity:  Moderate  Symptom progression:  Worsening  Had these symptoms before:  Yes  Has tried/received treatment for these symptoms:  Yes  Previous treatment was successful:  Yes  Prior treatment description:  Antibiotics  What makes it worse:  Antiobiotics  What makes it better:  Probiotics    She eats 0-1 servings of fruits and vegetables daily.She consumes 0 sweetened beverage(s) daily.She exercises with enough effort to increase her heart rate 10 to 19 minutes per day.  She exercises with enough effort to increase her heart rate 4 days per week.   She is taking medications regularly.        Review of Systems  Constitutional, HEENT, cardiovascular, pulmonary, GI, , musculoskeletal, neuro, skin, endocrine and psych systems are negative, except as otherwise noted.      Objective           Vitals:  No vitals were obtained today due to virtual visit.    Physical Exam   General: Alert and no distress //Respiratory: No  audible wheeze, cough, or shortness of breath // Psychiatric:  Appropriate affect, tone, and pace of words    The longitudinal plan of care for the diagnosis(es)/condition(s) as documented were addressed during this visit. Due to the added complexity in care, I will continue to support Deysi in the subsequent management and with ongoing continuity of care.    See orders  Chest xray  Labs for chronic disease and cdiff- hx cdiff on antibiotics recently      Phone call duration: 8 minutes  Signed Electronically by: ABDOULAYE PARMAR

## 2024-03-28 NOTE — RESULT ENCOUNTER NOTE
Scotty Jo,    Thank you for your recent office visit.    Here are your recent results.  Normal chest x-ray.  Cough could be related to allergies consider taking daily allergy medicine.    Feel free to contact me via [employer] or call the clinic at [phone].    Sincerely,    SAHARA Paez, FNP-BC

## 2024-06-03 ENCOUNTER — TELEPHONE (OUTPATIENT)
Dept: FAMILY MEDICINE | Facility: CLINIC | Age: 29
End: 2024-06-03
Payer: COMMERCIAL

## 2024-06-03 NOTE — TELEPHONE ENCOUNTER
Patient Quality Outreach    Patient is due for the following:   Cervical Cancer Screening - PAP Needed  Physical Preventive Adult Physical      Topic Date Due    COVID-19 Vaccine (1 - 2023-24 season) Never done     Type of outreach:    Sent [employer] message.  Questions for provider review:    None           Kim Rosa MA  Chart routed to Care Team.

## 2024-07-03 ENCOUNTER — TELEPHONE (OUTPATIENT)
Dept: FAMILY MEDICINE | Facility: CLINIC | Age: 29
End: 2024-07-03
Payer: COMMERCIAL

## 2024-07-03 NOTE — TELEPHONE ENCOUNTER
Patient Quality Outreach    Patient is due for the following:   Cervical Cancer Screening - PAP Needed  Physical Preventive Adult Physical      Topic Date Due    COVID-19 Vaccine (1 - 2023-24 season) Never done   Type of outreach:    Sent [employer] message.  Questions for provider review:  None       Kim Rosa MA  Chart routed to Care Team.

## 2024-08-02 ENCOUNTER — TELEPHONE (OUTPATIENT)
Dept: FAMILY MEDICINE | Facility: CLINIC | Age: 29
End: 2024-08-02

## 2024-08-02 NOTE — TELEPHONE ENCOUNTER
Patient Quality Outreach    Patient is due for the following:   Pap Cervical Cancer Screening  Physical Preventive Adult Physical      Topic Date Due    COVID-19 Vaccine (1 - 2023-24 season) Never done   Type of outreach:    Sent [employer] message.  Questions for provider review:  None       Kim Rosa MA  Chart routed to Care Team.

## 2024-09-03 ENCOUNTER — TELEPHONE (OUTPATIENT)
Dept: FAMILY MEDICINE | Facility: CLINIC | Age: 29
End: 2024-09-03
Payer: COMMERCIAL

## 2024-09-03 NOTE — TELEPHONE ENCOUNTER
Patient Quality Outreach    Patient is due for the following:   Cervical Cancer Screening - PAP Needed  Physical Preventive Adult Physical      Topic Date Due    Flu Vaccine (1) 09/01/2024    COVID-19 Vaccine (1 - 2023-24 season) Never done   Type of outreach:    Sent [employer] message.  Questions for provider review:  None       Kim Rosa MA  Chart routed to Care Team.

## 2024-09-22 ENCOUNTER — HEALTH MAINTENANCE LETTER (OUTPATIENT)
Age: 29
End: 2024-09-22

## 2024-10-23 ENCOUNTER — OFFICE VISIT (OUTPATIENT)
Dept: FAMILY MEDICINE | Facility: CLINIC | Age: 29
End: 2024-10-23
Payer: COMMERCIAL

## 2024-10-23 ENCOUNTER — TELEPHONE (OUTPATIENT)
Dept: FAMILY MEDICINE | Facility: CLINIC | Age: 29
End: 2024-10-23

## 2024-10-23 VITALS
HEART RATE: 83 BPM | TEMPERATURE: 96.8 F | SYSTOLIC BLOOD PRESSURE: 120 MMHG | RESPIRATION RATE: 18 BRPM | WEIGHT: 184.8 LBS | HEIGHT: 65 IN | DIASTOLIC BLOOD PRESSURE: 78 MMHG | OXYGEN SATURATION: 100 % | BODY MASS INDEX: 30.79 KG/M2

## 2024-10-23 DIAGNOSIS — F41.9 ANXIETY: ICD-10-CM

## 2024-10-23 DIAGNOSIS — E66.09 CLASS 1 OBESITY DUE TO EXCESS CALORIES WITH SERIOUS COMORBIDITY AND BODY MASS INDEX (BMI) OF 30.0 TO 30.9 IN ADULT: Primary | ICD-10-CM

## 2024-10-23 DIAGNOSIS — Z83.49 FAMILY HISTORY OF OBESITY: ICD-10-CM

## 2024-10-23 DIAGNOSIS — I10 BENIGN ESSENTIAL HYPERTENSION: ICD-10-CM

## 2024-10-23 DIAGNOSIS — R63.5 WEIGHT GAIN: ICD-10-CM

## 2024-10-23 DIAGNOSIS — J30.2 SEASONAL ALLERGIC RHINITIS, UNSPECIFIED TRIGGER: ICD-10-CM

## 2024-10-23 DIAGNOSIS — E66.09 CLASS 1 OBESITY DUE TO EXCESS CALORIES WITH SERIOUS COMORBIDITY AND BODY MASS INDEX (BMI) OF 30.0 TO 30.9 IN ADULT: ICD-10-CM

## 2024-10-23 DIAGNOSIS — E66.811 CLASS 1 OBESITY DUE TO EXCESS CALORIES WITH SERIOUS COMORBIDITY AND BODY MASS INDEX (BMI) OF 30.0 TO 30.9 IN ADULT: ICD-10-CM

## 2024-10-23 DIAGNOSIS — Z30.09 BIRTH CONTROL COUNSELING: ICD-10-CM

## 2024-10-23 DIAGNOSIS — E66.811 CLASS 1 OBESITY DUE TO EXCESS CALORIES WITH SERIOUS COMORBIDITY AND BODY MASS INDEX (BMI) OF 30.0 TO 30.9 IN ADULT: Primary | ICD-10-CM

## 2024-10-23 DIAGNOSIS — Z13.1 SCREENING FOR DIABETES MELLITUS: ICD-10-CM

## 2024-10-23 DIAGNOSIS — Z00.00 ROUTINE GENERAL MEDICAL EXAMINATION AT A HEALTH CARE FACILITY: Primary | ICD-10-CM

## 2024-10-23 DIAGNOSIS — Z13.220 SCREENING CHOLESTEROL LEVEL: ICD-10-CM

## 2024-10-23 DIAGNOSIS — Z13.29 SCREENING FOR THYROID DISORDER: ICD-10-CM

## 2024-10-23 DIAGNOSIS — Z12.4 CERVICAL CANCER SCREENING: ICD-10-CM

## 2024-10-23 LAB
ANION GAP SERPL CALCULATED.3IONS-SCNC: 11 MMOL/L (ref 7–15)
BUN SERPL-MCNC: 6.6 MG/DL (ref 6–20)
CALCIUM SERPL-MCNC: 9.4 MG/DL (ref 8.8–10.4)
CHLORIDE SERPL-SCNC: 106 MMOL/L (ref 98–107)
CHOLEST SERPL-MCNC: 137 MG/DL
CORTIS SERPL-MCNC: 23.7 UG/DL
CREAT SERPL-MCNC: 0.75 MG/DL (ref 0.51–0.95)
EGFRCR SERPLBLD CKD-EPI 2021: >90 ML/MIN/1.73M2
FASTING STATUS PATIENT QL REPORTED: ABNORMAL
FASTING STATUS PATIENT QL REPORTED: ABNORMAL
GLUCOSE SERPL-MCNC: 87 MG/DL (ref 70–99)
HCO3 SERPL-SCNC: 21 MMOL/L (ref 22–29)
HDLC SERPL-MCNC: 49 MG/DL
LDLC SERPL CALC-MCNC: 70 MG/DL
NONHDLC SERPL-MCNC: 88 MG/DL
POTASSIUM SERPL-SCNC: 4.6 MMOL/L (ref 3.4–5.3)
SODIUM SERPL-SCNC: 138 MMOL/L (ref 135–145)
TRIGL SERPL-MCNC: 92 MG/DL
TSH SERPL DL<=0.005 MIU/L-ACNC: 1.55 UIU/ML (ref 0.3–4.2)

## 2024-10-23 PROCEDURE — 87624 HPV HI-RISK TYP POOLED RSLT: CPT | Performed by: NURSE PRACTITIONER

## 2024-10-23 PROCEDURE — 99395 PREV VISIT EST AGE 18-39: CPT | Mod: 25 | Performed by: NURSE PRACTITIONER

## 2024-10-23 PROCEDURE — 84443 ASSAY THYROID STIM HORMONE: CPT | Performed by: NURSE PRACTITIONER

## 2024-10-23 PROCEDURE — 82533 TOTAL CORTISOL: CPT | Performed by: NURSE PRACTITIONER

## 2024-10-23 PROCEDURE — 99214 OFFICE O/P EST MOD 30 MIN: CPT | Mod: 25 | Performed by: NURSE PRACTITIONER

## 2024-10-23 PROCEDURE — 36415 COLL VENOUS BLD VENIPUNCTURE: CPT | Performed by: NURSE PRACTITIONER

## 2024-10-23 PROCEDURE — 80048 BASIC METABOLIC PNL TOTAL CA: CPT | Performed by: NURSE PRACTITIONER

## 2024-10-23 PROCEDURE — G0145 SCR C/V CYTO,THINLAYER,RESCR: HCPCS | Performed by: NURSE PRACTITIONER

## 2024-10-23 PROCEDURE — 80061 LIPID PANEL: CPT | Performed by: NURSE PRACTITIONER

## 2024-10-23 RX ORDER — METOPROLOL SUCCINATE 25 MG/1
TABLET, EXTENDED RELEASE ORAL
Qty: 90 TABLET | Refills: 3 | Status: SHIPPED | OUTPATIENT
Start: 2024-10-23

## 2024-10-23 RX ORDER — FLUTICASONE PROPIONATE 50 MCG
SPRAY, SUSPENSION (ML) NASAL
Qty: 48 G | Refills: 3 | Status: SHIPPED | OUTPATIENT
Start: 2024-10-23

## 2024-10-23 RX ORDER — HYDROXYZINE HYDROCHLORIDE 10 MG/1
TABLET, FILM COATED ORAL
Qty: 270 TABLET | Refills: 3 | Status: SHIPPED | OUTPATIENT
Start: 2024-10-23

## 2024-10-23 RX ORDER — NORGESTREL-ETHINYL ESTRADIOL 0.3-0.03MG
1 TABLET ORAL DAILY
Qty: 90 TABLET | Refills: 3 | Status: SHIPPED | OUTPATIENT
Start: 2024-10-23

## 2024-10-23 SDOH — HEALTH STABILITY: PHYSICAL HEALTH: ON AVERAGE, HOW MANY DAYS PER WEEK DO YOU ENGAGE IN MODERATE TO STRENUOUS EXERCISE (LIKE A BRISK WALK)?: 5 DAYS

## 2024-10-23 ASSESSMENT — SOCIAL DETERMINANTS OF HEALTH (SDOH): HOW OFTEN DO YOU GET TOGETHER WITH FRIENDS OR RELATIVES?: TWICE A WEEK

## 2024-10-23 ASSESSMENT — ANXIETY QUESTIONNAIRES
4. TROUBLE RELAXING: NOT AT ALL
GAD7 TOTAL SCORE: 0
7. FEELING AFRAID AS IF SOMETHING AWFUL MIGHT HAPPEN: NOT AT ALL
8. IF YOU CHECKED OFF ANY PROBLEMS, HOW DIFFICULT HAVE THESE MADE IT FOR YOU TO DO YOUR WORK, TAKE CARE OF THINGS AT HOME, OR GET ALONG WITH OTHER PEOPLE?: NOT DIFFICULT AT ALL
GAD7 TOTAL SCORE: 0
GAD7 TOTAL SCORE: 0
6. BECOMING EASILY ANNOYED OR IRRITABLE: NOT AT ALL
2. NOT BEING ABLE TO STOP OR CONTROL WORRYING: NOT AT ALL
1. FEELING NERVOUS, ANXIOUS, OR ON EDGE: NOT AT ALL
7. FEELING AFRAID AS IF SOMETHING AWFUL MIGHT HAPPEN: NOT AT ALL
IF YOU CHECKED OFF ANY PROBLEMS ON THIS QUESTIONNAIRE, HOW DIFFICULT HAVE THESE PROBLEMS MADE IT FOR YOU TO DO YOUR WORK, TAKE CARE OF THINGS AT HOME, OR GET ALONG WITH OTHER PEOPLE: NOT DIFFICULT AT ALL
3. WORRYING TOO MUCH ABOUT DIFFERENT THINGS: NOT AT ALL
5. BEING SO RESTLESS THAT IT IS HARD TO SIT STILL: NOT AT ALL

## 2024-10-23 NOTE — PATIENT INSTRUCTIONS
Patient Education   Preventive Care Advice   This is general advice given by our system to help you stay healthy. However, your care team may have specific advice just for you. Please talk to your care team about your preventive care needs.  Nutrition  Eat 5 or more servings of fruits and vegetables each day.  Try wheat bread, brown rice and whole grain pasta (instead of white bread, rice, and pasta).  Get enough calcium and vitamin D. Check the label on foods and aim for 100% of the RDA (recommended daily allowance).  Lifestyle  Exercise at least 150 minutes each week  (30 minutes a day, 5 days a week).  Do muscle strengthening activities 2 days a week. These help control your weight and prevent disease.  No smoking.  Wear sunscreen to prevent skin cancer.  Have a dental exam and cleaning every 6 months.  Yearly exams  See your health care team every year to talk about:  Any changes in your health.  Any medicines your care team has prescribed.  Preventive care, family planning, and ways to prevent chronic diseases.  Shots (vaccines)   HPV shots (up to age 26), if you've never had them before.  Hepatitis B shots (up to age 59), if you've never had them before.  COVID-19 shot: Get this shot when it's due.  Flu shot: Get a flu shot every year.  Tetanus shot: Get a tetanus shot every 10 years.  Pneumococcal, hepatitis A, and RSV shots: Ask your care team if you need these based on your risk.  Shingles shot (for age 50 and up)  General health tests  Diabetes screening:  Starting at age 35, Get screened for diabetes at least every 3 years.  If you are younger than age 35, ask your care team if you should be screened for diabetes.  Cholesterol test: At age 39, start having a cholesterol test every 5 years, or more often if advised.  Bone density scan (DEXA): At age 50, ask your care team if you should have this scan for osteoporosis (brittle bones).  Hepatitis C: Get tested at least once in your life.  STIs (sexually  transmitted infections)  Before age 24: Ask your care team if you should be screened for STIs.  After age 24: Get screened for STIs if you're at risk. You are at risk for STIs (including HIV) if:  You are sexually active with more than one person.  You don't use condoms every time.  You or a partner was diagnosed with a sexually transmitted infection.  If you are at risk for HIV, ask about PrEP medicine to prevent HIV.  Get tested for HIV at least once in your life, whether you are at risk for HIV or not.  Cancer screening tests  Cervical cancer screening: If you have a cervix, begin getting regular cervical cancer screening tests starting at age 21.  Breast cancer scan (mammogram): If you've ever had breasts, begin having regular mammograms starting at age 40. This is a scan to check for breast cancer.  Colon cancer screening: It is important to start screening for colon cancer at age 45.  Have a colonoscopy test every 10 years (or more often if you're at risk) Or, ask your provider about stool tests like a FIT test every year or Cologuard test every 3 years.  To learn more about your testing options, visit:   .  For help making a decision, visit:   https://bit.ly/qv09995.  Prostate cancer screening test: If you have a prostate, ask your care team if a prostate cancer screening test (PSA) at age 55 is right for you.  Lung cancer screening: If you are a current or former smoker ages 50 to 80, ask your care team if ongoing lung cancer screenings are right for you.  For informational purposes only. Not to replace the advice of your health care provider. Copyright   2023 [employer]. All rights reserved. Clinically reviewed by the [facility] Transitions Program. [employer] 531735 - REV 01/24.

## 2024-10-23 NOTE — TELEPHONE ENCOUNTER
Prior Authorization Retail Medication Request    Medication/Dose: semaglutide (OZEMPIC) 2 MG/3ML pen  Diagnosis and ICD code (if different than what is on RX):  I10 /R63.5 /E66.811, E66.09, Z68.30 /Z83.49   New/renewal/insurance change PA/secondary ins. PA:  Previously Tried and Failed:  na  Rationale:  na    Insurance   Primary:  [facility]   Insurance ID:  [id number]     Secondary (if applicable):na  Insurance ID:  sandie    Pharmacy Information (if different than what is on RX)  Name:  [facility]  Phone:  [phone]  Fax:      [phone]    Clinic Information  Preferred routing pool for dept communication: sandie

## 2024-10-23 NOTE — PROGRESS NOTES
Preventive Care Visit  [facility] ABDOULAYE ESTRADA, Family Medicine  Oct 23, 2024      Assessment & Plan     Routine general medical examination at a health care facility      Cervical cancer screening    - HPV and Gynecologic Cytology Panel - Recommended Age 30 - 65 Years    Birth control counseling    - norgestrel-ethinyl estradiol (CRYSELLE-28) 0.3-30 MG-MCG tablet; Take 1 tablet by mouth daily.    Benign essential hypertension    - Basic metabolic panel  (Ca, Cl, CO2, Creat, Gluc, K, Na, BUN); Future  - metoprolol succinate ER (TOPROL XL) 25 MG 24 hr tablet; TAKE ONE-HALF TABLET BY MOUTH ONCE DAILY  - semaglutide (OZEMPIC) 2 MG/3ML pen; Inject 0.25 mg subcutaneously every 7 days.  - semaglutide (OZEMPIC) 2 MG/3ML pen; Inject 0.5 mg subcutaneously every 7 days.  - Basic metabolic panel  (Ca, Cl, CO2, Creat, Gluc, K, Na, BUN)    Anxiety    - hydrOXYzine HCl (ATARAX) 10 MG tablet; TAKE 1 TO 3 TABLETS BY MOUTH EVERY NIGHT AT BEDTIME    Seasonal allergic rhinitis, unspecified trigger    - fluticasone (FLONASE) 50 MCG/ACT nasal spray; SHAKE LIQUID AND USE 2 SPRAYS IN EACH NOSTRIL EVERY DAY    Screening for diabetes mellitus    - Basic metabolic panel  (Ca, Cl, CO2, Creat, Gluc, K, Na, BUN); Future  - Basic metabolic panel  (Ca, Cl, CO2, Creat, Gluc, K, Na, BUN)    Screening for thyroid disorder    - TSH with free T4 reflex; Future  - TSH with free T4 reflex    Screening cholesterol level    - Lipid panel reflex to direct LDL Fasting; Future  - Lipid panel reflex to direct LDL Fasting    Weight gain    - Cortisol; Future  - semaglutide (OZEMPIC) 2 MG/3ML pen; Inject 0.25 mg subcutaneously every 7 days.  - semaglutide (OZEMPIC) 2 MG/3ML pen; Inject 0.5 mg subcutaneously every 7 days.  - Cortisol    Class 1 obesity due to excess calories with serious comorbidity and body mass index (BMI) of 30.0 to 30.9 in adult    - semaglutide (OZEMPIC) 2 MG/3ML pen; Inject 0.25 mg subcutaneously every  "7 days.  - semaglutide (OZEMPIC) 2 MG/3ML pen; Inject 0.5 mg subcutaneously every 7 days.    Family history of obesity    - semaglutide (OZEMPIC) 2 MG/3ML pen; Inject 0.25 mg subcutaneously every 7 days.  - semaglutide (OZEMPIC) 2 MG/3ML pen; Inject 0.5 mg subcutaneously every 7 days.    Patient has been advised of split billing requirements and indicates understanding: Yes        BMI  Estimated body mass index is 30.42 kg/m  as calculated from the following:    Height as of this encounter: 1.66 m (5' 5.35\").    Weight as of this encounter: 83.8 kg (184 lb 12.8 oz).   Weight management plan: Discussed healthy diet and exercise guidelines Has been exercising daily and eating 1200 calories, no alcohol and is not losing weight. Both sister and dad have had good success on semaglutide for weight. She is hoping weight loss would help her get off her blood pressure medication.  Under a lot of stress, planning wedding. Wanting to get pregnant after wedding; prefers to not be on medications. Current weight discussed. Benefits and risks of weight loss medication discussed. Tips given on AVS. Declining referral to nutrition or weight management at this time. Mental health good.     Counseling  Appropriate preventive services were addressed with this patient via screening, questionnaire, or discussion as appropriate for fall prevention, nutrition, physical activity, Tobacco-use cessation, social engagement, weight loss and cognition.  Checklist reviewing preventive services available has been given to the patient.  Reviewed patient's diet, addressing concerns and/or questions.       Work on weight loss  Regular exercise  See Patient Instructions    Monet Jo is a 29 year old, presenting for the following:  Physical      BP good, denies symptoms.  Hoping weight loss will help her get off BP medication (see above). Discussed monitoring BP at home, risks of uncontrolled HTN discussed. Allergies stable. Feels ears " plugged- advised can use flonase BID, benadryl at night, if persisting would need ENT referral. Wanting refills x 1 year. Tolerating OCP, discussed risks and symptoms to watch out for; non-smoker. In agreement to pap and labs, declining vaccines.       10/23/2024     7:48 AM   Additional Questions   Roomed by Kim LANE          9/28/2021     8:33 AM 8/1/2023     9:12 AM 10/23/2024     7:37 AM   ENOC-7 SCORE   Total Score 5 (mild anxiety) 0 (minimal anxiety) 0 (minimal anxiety)   Total Score 5 0 0        Patient-reported            4/29/2021     7:54 AM 9/28/2021     8:32 AM   PHQ   PHQ-9 Total Score 0 1   Q9: Thoughts of better off dead/self-harm past 2 weeks Not at all Not at all        Patient-reported        HPI              10/23/2024   General Health   How would you rate your overall physical health? (!) FAIR   Feel stress (tense, anxious, or unable to sleep) Not at all            10/23/2024   Nutrition   Three or more servings of calcium each day? Yes   Diet: Regular (no restrictions)   How many servings of fruit and vegetables per day? (!) 2-3   How many sweetened beverages each day? 0-1            10/23/2024   Exercise   Days per week of moderate/strenous exercise 5 days            10/23/2024   Social Factors   Frequency of gathering with friends or relatives Twice a week   Worry food won't last until get money to buy more No   Food not last or not have enough money for food? No   Do you have housing? (Housing is defined as stable permanent housing and does not include staying ouside in a car, in a tent, in an abandoned building, in an overnight shelter, or couch-surfing.) Yes   Are you worried about losing your housing? No   Lack of transportation? No   Unable to get utilities (heat,electricity)? No            10/23/2024   Dental   Dentist two times every year? Yes            10/23/2024   TB Screening   Were you born outside of the [place]? No              Today's PHQ-2 Score:       3/27/2024     7:05 AM   PHQ-2  (  [employer])   Q1: Little interest or pleasure in doing things 0    Q2: Feeling down, depressed or hopeless 0    PHQ-2 Score 0   Q1: Little interest or pleasure in doing things Not at all   Q2: Feeling down, depressed or hopeless Not at all   PHQ-2 Score 0       Patient-reported         10/23/2024   Substance Use   Alcohol more than 3/day or more than 7/wk No   Do you use any other substances recreationally? No        Social History     Tobacco Use    Smoking status: Never     Passive exposure: Never    Smokeless tobacco: Never   Vaping Use    Vaping status: Never Used   Substance Use Topics    Alcohol use: Yes     Comment: social    Drug use: Never          Mammogram Screening - Patient under 40 years of age: Routine Mammogram Screening not recommended.         10/23/2024   STI Screening   New sexual partner(s) since last STI/HIV test? No        History of abnormal Pap smear: No - age 30- 64 PAP with HPV every 5 years recommended        2021     7:47 AM   PAP / HPV   PAP (Historical) NIL            10/23/2024   Contraception/Family Planning   Questions about contraception or family planning (!) YES            Reviewed and updated as needed this visit by Provider                    History reviewed. No pertinent past medical history.  Past Surgical History:   Procedure Laterality Date    appenctomy Right 2020    DILATION AND CURETTAGE, OPERATIVE HYSTEROSCOPY WITH MORCELLATOR, COMBINED N/A 2021    Procedure: Hysteroscopy, Dilatation and Curettage ,Polypectomy with MYOSURE.;  Surgeon: Agbeh, Cephas Mawuena, MD;  Location: [facility] OR    ESOPHAGOSCOPY, GASTROSCOPY, DUODENOSCOPY (EGD), COMBINED N/A 07/15/2020    Procedure: ESOPHAGOGASTRODUODENOSCOPY, WITH BIOPSY;  Surgeon: Leventhal, Thomas Michael, MD;  Location: [facility] OR     OB History    Para Term  AB Living   0 0 0 0 0 0   SAB IAB Ectopic Multiple Live Births   0 0 0 0 0     Lab work is in process  Labs reviewed in EPIC  BP Readings from Last  3 Encounters:   10/23/24 120/78   08/01/23 110/72   03/21/23 (!) 140/87    Wt Readings from Last 3 Encounters:   10/23/24 83.8 kg (184 lb 12.8 oz)   08/01/23 92.1 kg (203 lb)   03/21/23 90.7 kg (200 lb)                  Patient Active Problem List   Diagnosis    Encounter for other contraceptive management    Palpitations    Benign essential hypertension    Endometrial mass    Veloz's esophagus with esophagitis    Abdominal bloating    Abdominal distension, gaseous    Diaphragmatic hernia    History of gastric ulcer    Nonspecific abdominal pain    Polyp of duodenum    Gastroesophageal reflux disease without esophagitis    Diarrhea of presumed infectious origin    Dry eyes    Seasonal allergic rhinitis, unspecified trigger    Gastroesophageal reflux disease with esophagitis without hemorrhage    Elevated liver function tests    C. difficile diarrhea     Past Surgical History:   Procedure Laterality Date    appenctomy Right 12/17/2020    DILATION AND CURETTAGE, OPERATIVE HYSTEROSCOPY WITH MORCELLATOR, COMBINED N/A 11/16/2021    Procedure: Hysteroscopy, Dilatation and Curettage ,Polypectomy with MYOSURE.;  Surgeon: Agbeh, Cephas Mawuena, MD;  Location:  OR    ESOPHAGOSCOPY, GASTROSCOPY, DUODENOSCOPY (EGD), COMBINED N/A 07/15/2020    Procedure: ESOPHAGOGASTRODUODENOSCOPY, WITH BIOPSY;  Surgeon: Leventhal, Thomas Michael, MD;  Location:  OR       Social History     Tobacco Use    Smoking status: Never     Passive exposure: Never    Smokeless tobacco: Never   Substance Use Topics    Alcohol use: Yes     Comment: social     History reviewed. No pertinent family history.      Current Outpatient Medications   Medication Sig Dispense Refill    cromolyn (OPTICROM) 4 % ophthalmic solution INSTILL 2 DROPS IN BOTH EYES FOUR TIMES DAILY 10 mL 10    fluticasone (FLONASE) 50 MCG/ACT nasal spray SHAKE LIQUID AND USE 2 SPRAYS IN EACH NOSTRIL EVERY DAY 48 g 3    hydrOXYzine HCl (ATARAX) 10 MG tablet TAKE 1 TO 3 TABLETS BY MOUTH  EVERY NIGHT AT BEDTIME 270 tablet 3    magnesium hydroxide (MILK OF MAGNESIA) 400 MG/5ML suspension Take by mouth daily as needed for constipation or heartburn      metoprolol succinate ER (TOPROL XL) 25 MG 24 hr tablet TAKE ONE-HALF TABLET BY MOUTH ONCE DAILY 90 tablet 3    norgestrel-ethinyl estradiol (CRYSELLE-28) 0.3-30 MG-MCG tablet Take 1 tablet by mouth daily. 90 tablet 3    semaglutide (OZEMPIC) 2 MG/3ML pen Inject 0.25 mg subcutaneously every 7 days. 3 mL 0    semaglutide (OZEMPIC) 2 MG/3ML pen Inject 0.5 mg subcutaneously every 7 days. 3 mL 0     Allergies   Allergen Reactions    Amoxicillin     Clavulanic Acid Hives    Doxycycline Hives    Penicillins Hives    Azithromycin Rash     Recent Labs   Lab Test 03/03/23  1226 09/12/22  1652 09/28/21  0930 09/28/21  0930 04/29/21  0754 03/04/20  0850   ALT 83*  --   --  35  --  35   CR 0.69 0.76   < > 0.71 0.78 0.71   GFRESTIMATED >90 >90   < > >90 >90 >90   GFRESTBLACK  --   --   --   --  >90 >90   POTASSIUM 3.9 4.3   < > 4.0 4.3 4.1   TSH  --   --   --  2.24  --   --     < > = values in this interval not displayed.          Review of Systems  CONSTITUTIONAL: NEGATIVE for fever, chills, change in weight  INTEGUMENTARY/SKIN: NEGATIVE for worrisome rashes, moles or lesions  EYES: NEGATIVE for vision changes or irritation  ENT/MOUTH: NEGATIVE for ear, mouth and throat problems  RESP: NEGATIVE for significant cough or SOB  BREAST: NEGATIVE for masses, tenderness or discharge  CV: NEGATIVE for chest pain, palpitations or peripheral edema  GI: NEGATIVE for nausea, abdominal pain, heartburn, or change in bowel habits  : NEGATIVE for frequency, dysuria, or hematuria  MUSCULOSKELETAL: NEGATIVE for significant arthralgias or myalgia  NEURO: NEGATIVE for weakness, dizziness or paresthesias  ENDOCRINE: NEGATIVE for temperature intolerance, skin/hair changes  HEME: NEGATIVE for bleeding problems  PSYCHIATRIC: NEGATIVE for changes in mood or affect     Objective    Exam  BP  "120/78   Pulse 83   Temp 96.8  F (36  C) (Temporal)   Resp 18   Ht 1.66 m (5' 5.35\")   Wt 83.8 kg (184 lb 12.8 oz)   LMP 10/10/2024 (Exact Date)   SpO2 100%   BMI 30.42 kg/m     Estimated body mass index is 30.42 kg/m  as calculated from the following:    Height as of this encounter: 1.66 m (5' 5.35\").    Weight as of this encounter: 83.8 kg (184 lb 12.8 oz).    Physical Exam  GENERAL: alert and no distress  EYES: Eyes grossly normal to inspection, PERRL and conjunctivae and sclerae normal  HENT: ear canals and TM's normal, nose and mouth without ulcers or lesions  NECK: no adenopathy, no asymmetry, masses, or scars  RESP: lungs clear to auscultation - no rales, rhonchi or wheezes  BREAST: deferred per guidelines, discussed SOB, symptoms to watch out for and when to follow up  CV: regular rate and rhythm, normal S1 S2, no S3 or S4, no murmur, click or rub, no peripheral edema  ABDOMEN: soft, nontender, no hepatosplenomegaly, no masses and bowel sounds normal   (female): normal female external genitalia, normal urethral meatus, normal vaginal mucosa  MS: no gross musculoskeletal defects noted, no edema, no CVA tenderness  SKIN: no suspicious lesions or rashes  NEURO: Normal strength and tone, cranial nerves intact, mentation intact and speech normal  PSYCH: mentation appears normal, affect normal/bright  LYMPH: no cervical, supraclavicular, axillary, or inguinal adenopathy        Signed Electronically by: ABDOULAYE PARMAR    Answers submitted by the patient for this visit:  Patient Health Questionnaire (G7) (Submitted on 10/23/2024)  ENOC 7 TOTAL SCORE: 0    "

## 2024-10-24 ENCOUNTER — MYC MEDICAL ADVICE (OUTPATIENT)
Dept: FAMILY MEDICINE | Facility: CLINIC | Age: 29
End: 2024-10-24
Payer: COMMERCIAL

## 2024-10-24 DIAGNOSIS — I10 BENIGN ESSENTIAL HYPERTENSION: ICD-10-CM

## 2024-10-24 DIAGNOSIS — E66.811 CLASS 1 OBESITY DUE TO EXCESS CALORIES WITH SERIOUS COMORBIDITY AND BODY MASS INDEX (BMI) OF 30.0 TO 30.9 IN ADULT: ICD-10-CM

## 2024-10-24 DIAGNOSIS — E66.09 CLASS 1 OBESITY DUE TO EXCESS CALORIES WITH SERIOUS COMORBIDITY AND BODY MASS INDEX (BMI) OF 30.0 TO 30.9 IN ADULT: ICD-10-CM

## 2024-10-24 DIAGNOSIS — R63.5 WEIGHT GAIN: Primary | ICD-10-CM

## 2024-10-24 LAB
HPV HR 12 DNA CVX QL NAA+PROBE: NEGATIVE
HPV16 DNA CVX QL NAA+PROBE: NEGATIVE
HPV18 DNA CVX QL NAA+PROBE: NEGATIVE
HUMAN PAPILLOMA VIRUS FINAL DIAGNOSIS: NORMAL

## 2024-10-24 NOTE — LETTER

## 2024-10-24 NOTE — TELEPHONE ENCOUNTER
PRIOR AUTHORIZATION DENIED    Medication: OZEMPIC (0.25 OR 0.5 MG/DOSE) 2 [facility]ML [facility]  Insurance Company: [employer] - Phone [phone] Fax [phone]  Denial Date: 10/23/2024  Denial Reason(s): MEDICATION IS NOT COVERED FOR DX - only covered for [employer]  POSSIBLE ALTERNATIVES: Saxenda, Wegovy or Zepbound    Appeal Information:     Patient Notified: NO

## 2024-10-25 NOTE — RESULT ENCOUNTER NOTE
Scotty Jo,    Thank you for your recent office visit.    Here are your recent results.  Normal glucose you are not diabetic.  Normal kidney function.  Normal thyroid level and cortisol level.  Normal cholesterol for a nondiabetic.    Feel free to contact me via [employer] or call the clinic at [phone].    Sincerely,    SAHARA Paez, FNP-BC

## 2024-10-29 LAB
BKR AP ASSOCIATED HPV REPORT: NORMAL
BKR LAB AP GYN ADEQUACY: NORMAL
BKR LAB AP GYN INTERPRETATION: NORMAL
BKR LAB AP LMP: NORMAL
BKR LAB AP PREVIOUS ABNORMAL: NORMAL
PATH REPORT.COMMENTS IMP SPEC: NORMAL
PATH REPORT.COMMENTS IMP SPEC: NORMAL
PATH REPORT.RELEVANT HX SPEC: NORMAL

## 2024-10-30 NOTE — TELEPHONE ENCOUNTER
Routing MyChart message to provider.    Patient would like prescription for phentermine sent to pharmacy.    Pharmacy pended.    Christine M Klisch, RN

## 2024-10-31 PROBLEM — E66.09 CLASS 1 OBESITY DUE TO EXCESS CALORIES WITH SERIOUS COMORBIDITY AND BODY MASS INDEX (BMI) OF 30.0 TO 30.9 IN ADULT: Status: ACTIVE | Noted: 2024-10-31

## 2024-10-31 PROBLEM — E66.811 CLASS 1 OBESITY DUE TO EXCESS CALORIES WITH SERIOUS COMORBIDITY AND BODY MASS INDEX (BMI) OF 30.0 TO 30.9 IN ADULT: Status: ACTIVE | Noted: 2024-10-31

## 2024-10-31 RX ORDER — PHENTERMINE HYDROCHLORIDE 37.5 MG/1
37.5 CAPSULE ORAL EVERY MORNING
Qty: 30 CAPSULE | Refills: 0 | Status: SHIPPED | OUTPATIENT
Start: 2024-10-31

## 2024-11-01 RX ORDER — PHENTERMINE HYDROCHLORIDE 37.5 MG/1
37.5 CAPSULE ORAL EVERY MORNING
Qty: 30 CAPSULE | Refills: 2 | Status: SHIPPED | OUTPATIENT
Start: 2024-11-01

## 2024-11-04 ENCOUNTER — NURSE TRIAGE (OUTPATIENT)
Dept: FAMILY MEDICINE | Facility: CLINIC | Age: 29
End: 2024-11-04
Payer: COMMERCIAL

## 2024-11-04 NOTE — TELEPHONE ENCOUNTER
"Patient scheduled to see PCP tomorrow 11/5/24 at 9:30 am       Cailin Mahajan RN on 11/4/2024 at 3:16 PM        Reason for Disposition   Localized rash present > 7 days    Additional Information   Negative: Fever and localized purple or blood-colored spots or dots that are not from injury or friction   Negative: Fever and localized rash is very painful   Negative: Patient sounds very sick or weak to the triager   Negative: Looks like a boil, infected sore, deep ulcer, or other infected rash (spreading redness, pus)    Answer Assessment - Initial Assessment Questions  1. APPEARANCE of RASH: \"Describe the rash.\"       Flaky, red and irritated   2. LOCATION: \"Where is the rash located?\"       L breast   3. NUMBER: \"How many spots are there?\"       Just the nipple   4. SIZE: \"How big are the spots?\" (Inches, centimeters or compare to size of a coin)       Just the whole nipple   5. ONSET: \"When did the rash start?\"       Approx 1 1/12 months ago, not going away with OTC   6. ITCHING: \"Does the rash itch?\" If Yes, ask: \"How bad is the itch?\"  (Scale 0-10; or none, mild, moderate, severe)      Yes itching 6/10  7. PAIN: \"Does the rash hurt?\" If Yes, ask: \"How bad is the pain?\"  (Scale 0-10; or none, mild, moderate, severe)     - NONE (0): no pain     - MILD (1-3): doesn't interfere with normal activities      - MODERATE (4-7): interferes with normal activities or awakens from sleep      - SEVERE (8-10): excruciating pain, unable to do any normal activities      No   8. OTHER SYMPTOMS: \"Do you have any other symptoms?\" (e.g., fever)      no  9. PREGNANCY: \"Is there any chance you are pregnant?\" \"When was your last menstrual period?\"      no    Protocols used: Rash or Redness - [id number]    "

## 2024-11-05 ENCOUNTER — OFFICE VISIT (OUTPATIENT)
Dept: FAMILY MEDICINE | Facility: CLINIC | Age: 29
End: 2024-11-05
Payer: COMMERCIAL

## 2024-11-05 VITALS
OXYGEN SATURATION: 100 % | TEMPERATURE: 97.8 F | RESPIRATION RATE: 16 BRPM | SYSTOLIC BLOOD PRESSURE: 118 MMHG | HEART RATE: 81 BPM | DIASTOLIC BLOOD PRESSURE: 80 MMHG

## 2024-11-05 DIAGNOSIS — L30.4 CHAFING: Primary | ICD-10-CM

## 2024-11-05 PROCEDURE — 99213 OFFICE O/P EST LOW 20 MIN: CPT | Performed by: PHYSICIAN ASSISTANT

## 2024-11-05 RX ORDER — TRIAMCINOLONE ACETONIDE 1 MG/G
OINTMENT TOPICAL 3 TIMES DAILY
Qty: 30 G | Refills: 0 | Status: SHIPPED | OUTPATIENT
Start: 2024-11-05 | End: 2024-11-12

## 2024-11-05 NOTE — PROGRESS NOTES
Assessment & Plan       ICD-10-CM    1. Chafing  L30.4 triamcinolone (KENALOG) 0.1 % external ointment          Most likely chafing. Reassured her that it can occur unilaterally. TCS 2-3x daily x7-14 days. We discussed things to do to prevent chafing. Coconut oil or a barrier ointment can be helpful when running.        I was present with the student who participated in the service and in the documentation of the note. I have verified the history and personally performed the physical exam and medical decision making.   Moon Emery PA-C    Return in about 2 weeks (around 11/19/2024), or if symptoms worsen or fail to improve.      Monet Jo is a 29 year old, presenting for the following health issues:  Derm Problem        11/5/2024     8:50 AM   Additional Questions   Roomed by Estee   Accompanied by Self         11/5/2024   Declines Weight   Did patient decline having their weight taken? Yes          11/5/2024     8:50 AM   Patient Reported Additional Medications   Patient reports taking the following new medications NA       Patient arrived to discuss symptoms of Rash on Left Breast x 1 month.     Area is Red/Flakey, Itchy, not spreading but not improving either. Pt has tried OTC Hydrocortisone Cream without relief.    Left nipple with rash right unafffected,  Started exercise recently and thought it was chaffing but it hasn't gone away  Has tried hydrocortisol cream without relief  Itchy, no discharge or drainage,   Located on the areola   No painful  Nothing seems to make it better or worse, has improved slightly over time   No new nipple stimulus   No palpable breast masses on at home breast exams or family history of breast cancer    History of Present Illness       Reason for visit:  Breast changes  Symptom onset:  More than a month  Symptoms include:  Rash and itchiness  Symptom progression:  Staying the same  Had these symptoms before:  No   She is taking medications regularly.         Review of  Systems  Constitutional, breast, skin systems are negative, except as otherwise noted.      Objective    /80 (BP Location: Left arm, Patient Position: Chair, Cuff Size: Adult Regular)   Pulse 81   Temp 97.8  F (36.6  C) (Tympanic)   Resp 16   LMP 10/10/2024 (Exact Date)   SpO2 100%   There is no height or weight on file to calculate BMI.  Physical Exam   GENERAL: alert and no distress  BREAST: an erythematous plaque of lateral left areola with excoriations   MS: no gross musculoskeletal defects noted, no edema  NEURO: Normal strength and tone, mentation intact and speech normal  PSYCH: mentation appears normal, affect normal/bright          Signed Electronically by: Moon Emery PA-C

## 2025-03-06 NOTE — PATIENT INSTRUCTIONS
To Schedule an Appointment 24/7  Call: [phone]    If you have any questions regarding your visit, Please contact your care team.  [facility]: [phone]  [facility] HOURS TELEPHONE NUMBER   Cephas Agbeh, M.D.      James Dowling-Surgery Scheduler  Lucie-Surgery Scheduler       Monday - [place]:    8:00 am-4:45 pm  Tuesday - [place]:   8:00 am-4:45 pm  Friday-[place]:       8:00 am-4:45 pm  Typical Surgery Day:  Wednesday [facility]   [address] [place] [address]   [phone]   Fax [phone]    Imaging Scheduling all locations  [phone]      [facility]   [address] [place] [address] [place] [address]   [phone]    [facility]  [address] [facility] [address]  [phone]  Fax [phone]   Urgent Care locations:  [facility] Monday-Friday                               10 am - 8 pm  Saturday and Sunday                      9 am - 5 pm  Monday-Friday                              10 am- 8 pm  Saturday and Sunday                      9 am - 5 pm    [phone]   **Surgeries** Our Surgery Schedulers will contact you to schedule. If you do not receive a call within 3 business days, please call [phone].  If you need a medication refill, please contact your pharmacy. Please allow 3 business days for your refill to be completed.  As always, Thank you for trusting us with your healthcare needs!  see additional instructions from your care team below

## 2025-03-10 ENCOUNTER — OFFICE VISIT (OUTPATIENT)
Dept: OBGYN | Facility: CLINIC | Age: 30
End: 2025-03-10
Payer: COMMERCIAL

## 2025-03-10 ENCOUNTER — MYC MEDICAL ADVICE (OUTPATIENT)
Dept: FAMILY MEDICINE | Facility: CLINIC | Age: 30
End: 2025-03-10

## 2025-03-10 VITALS
BODY MASS INDEX: 28.97 KG/M2 | SYSTOLIC BLOOD PRESSURE: 125 MMHG | DIASTOLIC BLOOD PRESSURE: 91 MMHG | OXYGEN SATURATION: 100 % | WEIGHT: 176 LBS | HEART RATE: 80 BPM

## 2025-03-10 DIAGNOSIS — Z31.69 ENCOUNTER FOR PRECONCEPTION CONSULTATION: Primary | ICD-10-CM

## 2025-03-10 PROCEDURE — 3080F DIAST BP >= 90 MM HG: CPT | Performed by: OBSTETRICS & GYNECOLOGY

## 2025-03-10 PROCEDURE — 99203 OFFICE O/P NEW LOW 30 MIN: CPT | Performed by: OBSTETRICS & GYNECOLOGY

## 2025-03-10 PROCEDURE — 3074F SYST BP LT 130 MM HG: CPT | Performed by: OBSTETRICS & GYNECOLOGY

## 2025-03-10 RX ORDER — FOLIC ACID 1 MG/1
1 TABLET ORAL DAILY
Qty: 90 TABLET | Refills: 3 | Status: SHIPPED | OUTPATIENT
Start: 2025-03-10

## 2025-03-10 NOTE — TELEPHONE ENCOUNTER
Patient had appointment today 3/10/25 with OBGYN Dr. Agbeh for Encounter for preconception consultation.     OV note from OBGYN     She has chronic hypertension and has been on Toprol medication for years.  She is wondering if she should come off the medication.  We discussed that chronic hypertension is a risk for preeclampsia in pregnancy.  She should discuss with her primary care provider about either switching to an acceptable medication prior to pregnancy or pregnancy..     Patient should have follow up appointment with PCP to further discuss medications safe/not safe for pregnancy and any medications that should be prescribed as alternatives if needed.       Cailin Mahajan RN on 3/10/2025 at 2:58 PM

## 2025-03-10 NOTE — PROGRESS NOTES
Deysi is a 29 year old  referred here by self for consultation regarding preconception counseling..  Patient says she just got  about a month ago.  Has also stopped taking oral contraceptives.  She has not started trying to get pregnant right away..  She had questions about her previous hysteroscopy D&C in  for endometrial polyps.  Since then she has not had any abnormal uterine bleeding.  She has chronic hypertension and has been on Toprol medication for years.  She is wondering if she should come off the medication.  We discussed that chronic hypertension is a risk for preeclampsia in pregnancy.  She should discuss with her primary care provider about either switching to an acceptable medication prior to pregnancy or pregnancy..    ROS: Ten point review of systems was reviewed and negative except the above.    Gyne: - abn pap (last pap ), - STD's    History reviewed. No pertinent past medical history.  Past Surgical History:   Procedure Laterality Date    appenctomy Right 2020    DILATION AND CURETTAGE, OPERATIVE HYSTEROSCOPY WITH MORCELLATOR, COMBINED N/A 2021    Procedure: Hysteroscopy, Dilatation and Curettage ,Polypectomy with MYOSURE.;  Surgeon: Agbeh, Cephas Mawuena, MD;  Location:  OR    ESOPHAGOSCOPY, GASTROSCOPY, DUODENOSCOPY (EGD), COMBINED N/A 07/15/2020    Procedure: ESOPHAGOGASTRODUODENOSCOPY, WITH BIOPSY;  Surgeon: Leventhal, Thomas Michael, MD;  Location:  OR     Patient Active Problem List   Diagnosis    Encounter for other contraceptive management    Palpitations    Benign essential hypertension    Endometrial mass    Veloz's esophagus with esophagitis    Abdominal bloating    Abdominal distension, gaseous    Diaphragmatic hernia    History of gastric ulcer    Nonspecific abdominal pain    Polyp of duodenum    Gastroesophageal reflux disease without esophagitis    Diarrhea of presumed infectious origin    Dry eyes    Seasonal allergic rhinitis, unspecified trigger     Gastroesophageal reflux disease with esophagitis without hemorrhage    Elevated liver function tests    C. difficile diarrhea    Class 1 obesity due to excess calories with serious comorbidity and body mass index (BMI) of 30.0 to 30.9 in adult       ALL/Meds: Her medication and allergy histories were reviewed and are documented in their appropriate chart areas.    SH: - tob, - EtOH,     FH: Her family history was reviewed and documented in its appropriate chart area.    PE: BP (!) 125/91 (BP Location: Left arm, Patient Position: Sitting, Cuff Size: Adult Regular)   Pulse 80   Wt 79.8 kg (176 lb)   LMP 2025 (Approximate)   SpO2 100%   BMI 28.97 kg/m    Body mass index is 28.97 kg/m .    General Appearance:  healthy, alert, active, no distress  HEENT: NCAT    A/P    ICD-10-CM    1. Encounter for preconception consultation  Z31.69 folic acid (FOLVITE) 1 MG tablet        Education of women intending to be pregnant is recommended to include:ACOG pamphlet provided to her about the bellow topics.     labor education and prevention  Substance use  Weight and nutrition and pregnancy  Domestic violence and pregnancy  List of medications, dietary supplements, herbal supplements  Accurate recording of menstrual dates  Counseling in case of potential vaginal birth after Caesarean  [edit] Vaccination and prophylaxisVaccination and other prophylaxis of women intending to become pregnant is recommended to include;    Tdap or tetanus booster[ if needed  rubella and/or varicella vaccination if needed  Hepatitis B vaccine  Folic acid supplementation     CEPHAS AGBEH, MD.

## 2025-05-26 ENCOUNTER — MYC MEDICAL ADVICE (OUTPATIENT)
Dept: FAMILY MEDICINE | Facility: CLINIC | Age: 30
End: 2025-05-26
Payer: COMMERCIAL

## 2025-06-16 ENCOUNTER — VIRTUAL VISIT (OUTPATIENT)
Dept: FAMILY MEDICINE | Facility: CLINIC | Age: 30
End: 2025-06-16
Payer: COMMERCIAL

## 2025-06-16 DIAGNOSIS — Z31.69 ENCOUNTER FOR PRECONCEPTION CONSULTATION: Primary | ICD-10-CM

## 2025-06-16 DIAGNOSIS — Z31.41 FERTILITY TESTING: ICD-10-CM

## 2025-06-16 DIAGNOSIS — Z31.9 INFERTILITY MANAGEMENT: ICD-10-CM

## 2025-06-16 PROCEDURE — 98006 SYNCH AUDIO-VIDEO EST MOD 30: CPT | Performed by: NURSE PRACTITIONER

## 2025-06-16 NOTE — PROGRESS NOTES
Deysi is a 29 year old who is being evaluated via a billable video visit.    How would you like to obtain your AVS? MyChart  If the video visit is dropped, the invitation should be resent by: Text to cell phone: [phone]  Will anyone else be joining your video visit? No      Assessment & Plan     Encounter for preconception consultation  Has  been tracking menstrual cycle and met with OBGYN. Purchased online program to track hormone levels. This system notes progesterone is low. Has been trying to conceive for 6 months without success. Does not smoke nor drink alcohol. Taking prenatal vitamin. Discussed labs, and fertility appointment. Other testing that may be needed. Normal for pregnancy to take up to one year while actively trying to conceive.  Tips given on AVS.   - Follicle stimulating hormone; Future  - Luteinizing Hormone; Future  - Anti-Mullerian hormone; Future  - Prolactin; Future  - TSH with free T4 reflex; Future  - Progesterone; Future  - Estradiol; Future    Fertility testing  Has  been tracking menstrual cycle and met with OBGYN. Purchased online program to track hormone levels. This system notes progesterone is low. Has been trying to conceive for 6 months without success. Does not smoke nor drink alcohol. Taking prenatal vitamin. Discussed labs, and fertility appointment. Other testing that may be needed. Normal for pregnancy to take up to one year while actively trying to conceive.  Tips given on AVS.   - Follicle stimulating hormone; Future  - Luteinizing Hormone; Future  - Anti-Mullerian hormone; Future  - Prolactin; Future  - TSH with free T4 reflex; Future  - Progesterone; Future  - Estradiol; Future    Infertility management  Has  been tracking menstrual cycle and met with OBGYN. Purchased online program to track hormone levels. This system notes progesterone is low. Has been trying to conceive for 6 months without success. Does not smoke nor drink alcohol. Taking prenatal vitamin.  "Discussed labs, and fertility appointment. Other testing that may be needed. Normal for pregnancy to take up to one year while actively trying to conceive.  Tips given on AVS.   - Ob/Gyn  Referral; Future          BMI  Estimated body mass index is 28.97 kg/m  as calculated from the following:    Height as of 10/23/24: 1.66 m (5' 5.35\").    Weight as of 3/10/25: 79.8 kg (176 lb).         Follow-up  Return in about 3 months (around 9/16/2025), or if symptoms worsen or fail to improve.    Monet Jo is a 29 year old, presenting for the following health issues:  Lab Work Request        6/16/2025     8:19 AM   Additional Questions   Roomed by Patient completed questions via [facility]     History of Present Illness       Reason for visit:  Blood work for fertility    She eats 0-1 servings of fruits and vegetables daily.She consumes 1 sweetened beverage(s) daily.She exercises with enough effort to increase her heart rate 10 to 19 minutes per day.  She exercises with enough effort to increase her heart rate 4 days per week.   She is taking medications regularly.            Review of Systems  Constitutional, HEENT, cardiovascular, pulmonary, GI, , musculoskeletal, neuro, skin, endocrine and psych systems are negative, except as otherwise noted.      Objective           Vitals:  No vitals were obtained today due to virtual visit.    Physical Exam   GENERAL: alert and no distress  EYES: Eyes grossly normal to inspection.  No discharge or erythema, or obvious scleral/conjunctival abnormalities.  RESP: No audible wheeze, cough, or visible cyanosis.    SKIN: Visible skin clear. No significant rash, abnormal pigmentation or lesions.  NEURO: Cranial nerves grossly intact.  Mentation and speech appropriate for age.  PSYCH: Appropriate affect, tone, and pace of words    See orders- to make lab appt      The longitudinal plan of care for the diagnosis(es)/condition(s) as documented were addressed during this visit. " Due to the added complexity in care, I will continue to support Deysi in the subsequent management and with ongoing continuity of care.      Video-Visit Details    Type of service:  Video Visit   Originating Location (pt. Location): Home    Distant Location (provider location):  Off-site  Platform used for Video Visit: Anuj  Signed Electronically by: ABDOULAYE PARMAR

## 2025-06-17 ENCOUNTER — LAB (OUTPATIENT)
Dept: LAB | Facility: CLINIC | Age: 30
End: 2025-06-17
Payer: COMMERCIAL

## 2025-06-17 ENCOUNTER — PATIENT OUTREACH (OUTPATIENT)
Dept: CARE COORDINATION | Facility: CLINIC | Age: 30
End: 2025-06-17

## 2025-06-17 DIAGNOSIS — Z31.41 FERTILITY TESTING: ICD-10-CM

## 2025-06-17 DIAGNOSIS — Z31.69 ENCOUNTER FOR PRECONCEPTION CONSULTATION: ICD-10-CM

## 2025-06-17 LAB — TSH SERPL DL<=0.005 MIU/L-ACNC: 2.44 UIU/ML (ref 0.3–4.2)

## 2025-06-18 LAB
ESTRADIOL SERPL-MCNC: 172 PG/ML
FSH SERPL IRP2-ACNC: 1.7 MIU/ML
LH SERPL-ACNC: 6.9 MIU/ML
PROGEST SERPL-MCNC: 9.1 NG/ML
PROLACTIN SERPL 3RD IS-MCNC: 23 NG/ML (ref 5–23)

## 2025-06-19 ENCOUNTER — PATIENT OUTREACH (OUTPATIENT)
Dept: CARE COORDINATION | Facility: CLINIC | Age: 30
End: 2025-06-19
Payer: COMMERCIAL

## 2025-06-20 ENCOUNTER — RESULTS FOLLOW-UP (OUTPATIENT)
Dept: FAMILY MEDICINE | Facility: CLINIC | Age: 30
End: 2025-06-20

## 2025-08-25 ENCOUNTER — MYC MEDICAL ADVICE (OUTPATIENT)
Dept: FAMILY MEDICINE | Facility: CLINIC | Age: 30
End: 2025-08-25
Payer: COMMERCIAL

## (undated) DEVICE — PREP SKIN SCRUB TRAY 4461A

## (undated) DEVICE — TUBING SUCTION 12"X1/4" N612

## (undated) DEVICE — PACK MINOR SBA15MIFSE

## (undated) DEVICE — KIT ENDO TURNOVER/PROCEDURE CARRY-ON 101822

## (undated) DEVICE — SOL WATER IRRIG 1000ML BOTTLE 2F7114

## (undated) DEVICE — DRAPE GYN/UROLOGY FLUID POUCH TUR 29455

## (undated) DEVICE — ENDO BITE BLOCK ADULT OMNI-BLOC

## (undated) DEVICE — SEAL SET MYOSURE ROD LENS SCOPE SINGLE USE 40-902

## (undated) DEVICE — DEVICE TISSUE REMOVAL HYSTEROSCOPIC MYOSURE LITE 30-401LITE

## (undated) DEVICE — DRSG TELFA 3X8" 1238

## (undated) DEVICE — PAD PERI W/WINGS 1580A

## (undated) DEVICE — GLOVE PROTEXIS W/NEU-THERA 7.5  2D73TE75

## (undated) DEVICE — SOL WATER IRRIG 1000ML BOTTLE 07139-09

## (undated) DEVICE — SUCTION CATH AIRLIFE TRI-FLO W/CONTROL PORT 14FR  T60C

## (undated) DEVICE — SUCTION MANIFOLD NEPTUNE 2 SYS 1 PORT 702-025-000

## (undated) DEVICE — SYR 30ML SLIP TIP W/O NDL 302833

## (undated) DEVICE — Device

## (undated) DEVICE — KIT ENDO FIRST STEP DISINFECTANT 200ML W/POUCH EP-4

## (undated) DEVICE — SOL NACL 0.9% INJ 1000ML BAG 07983-09

## (undated) DEVICE — KIT PROCEDURE FLUENT IN/OUT FLOWPAK TISS TRAP FLT-112S

## (undated) RX ORDER — LIDOCAINE HYDROCHLORIDE 20 MG/ML
INJECTION, SOLUTION INFILTRATION; PERINEURAL
Status: DISPENSED
Start: 2021-11-16

## (undated) RX ORDER — ACETAMINOPHEN 325 MG/1
TABLET ORAL
Status: DISPENSED
Start: 2021-11-16

## (undated) RX ORDER — ONDANSETRON 2 MG/ML
INJECTION INTRAMUSCULAR; INTRAVENOUS
Status: DISPENSED
Start: 2021-11-16

## (undated) RX ORDER — DEXAMETHASONE SODIUM PHOSPHATE 4 MG/ML
INJECTION, SOLUTION INTRA-ARTICULAR; INTRALESIONAL; INTRAMUSCULAR; INTRAVENOUS; SOFT TISSUE
Status: DISPENSED
Start: 2021-11-16

## (undated) RX ORDER — PROPOFOL 10 MG/ML
INJECTION, EMULSION INTRAVENOUS
Status: DISPENSED
Start: 2021-11-16

## (undated) RX ORDER — GLYCOPYRROLATE 0.2 MG/ML
INJECTION, SOLUTION INTRAMUSCULAR; INTRAVENOUS
Status: DISPENSED
Start: 2021-11-16

## (undated) RX ORDER — IODINE AND POTASSIUM IODIDE 50; 100 MG/ML; MG/ML
LIQUID ORAL
Status: DISPENSED
Start: 2021-11-16

## (undated) RX ORDER — ACETIC ACID 3 %
LIQUID (ML) MISCELLANEOUS
Status: DISPENSED
Start: 2021-11-16

## (undated) RX ORDER — FENTANYL CITRATE 50 UG/ML
INJECTION, SOLUTION INTRAMUSCULAR; INTRAVENOUS
Status: DISPENSED
Start: 2021-11-16

## (undated) RX ORDER — KETOROLAC TROMETHAMINE 30 MG/ML
INJECTION, SOLUTION INTRAMUSCULAR; INTRAVENOUS
Status: DISPENSED
Start: 2021-11-16

## (undated) RX ORDER — FERRIC SUBSULFATE 0.21 G/G
LIQUID TOPICAL
Status: DISPENSED
Start: 2021-11-16

## (undated) RX ORDER — LIDOCAINE HYDROCHLORIDE AND EPINEPHRINE 10; 10 MG/ML; UG/ML
INJECTION, SOLUTION INFILTRATION; PERINEURAL
Status: DISPENSED
Start: 2021-11-16

## (undated) RX ORDER — FENTANYL CITRATE 50 UG/ML
INJECTION, SOLUTION INTRAMUSCULAR; INTRAVENOUS
Status: DISPENSED
Start: 2020-07-15

## (undated) RX ORDER — BUPIVACAINE HYDROCHLORIDE 2.5 MG/ML
INJECTION, SOLUTION INFILTRATION; PERINEURAL
Status: DISPENSED
Start: 2021-11-16

## (undated) RX ORDER — CLINDAMYCIN PHOSPHATE 150 MG/ML
INJECTION, SOLUTION INTRAVENOUS
Status: DISPENSED
Start: 2021-11-16